# Patient Record
Sex: MALE | Race: WHITE | Employment: OTHER | URBAN - METROPOLITAN AREA
[De-identification: names, ages, dates, MRNs, and addresses within clinical notes are randomized per-mention and may not be internally consistent; named-entity substitution may affect disease eponyms.]

---

## 2017-01-23 DIAGNOSIS — F51.02 TRANSIENT INSOMNIA: ICD-10-CM

## 2017-01-23 DIAGNOSIS — F41.9 ANXIETY: Primary | ICD-10-CM

## 2017-01-26 RX ORDER — ZOLPIDEM TARTRATE 10 MG/1
TABLET ORAL
Qty: 30 TABLET | Refills: 0 | Status: SHIPPED | OUTPATIENT
Start: 2017-01-26 | End: 2017-02-24

## 2017-01-26 RX ORDER — LORAZEPAM 1 MG/1
TABLET ORAL
Qty: 30 TABLET | Refills: 0 | Status: SHIPPED | OUTPATIENT
Start: 2017-01-26 | End: 2017-02-24

## 2017-01-31 ENCOUNTER — TELEPHONE (OUTPATIENT)
Dept: FAMILY MEDICINE | Facility: CLINIC | Age: 62
End: 2017-01-31

## 2017-01-31 NOTE — TELEPHONE ENCOUNTER
Patient has not gotten refill yet.  Per note it was faxed to [facility] instead of [facility].  [facility] and let patient know that it was done.  Apologized.  Dina Keys RN- Triage [employer]

## 2017-02-24 DIAGNOSIS — F51.02 TRANSIENT INSOMNIA: ICD-10-CM

## 2017-02-24 DIAGNOSIS — F41.9 ANXIETY: ICD-10-CM

## 2017-02-24 RX ORDER — LORAZEPAM 1 MG/1
TABLET ORAL
Qty: 30 TABLET | Refills: 0 | Status: SHIPPED | OUTPATIENT
Start: 2017-02-24 | End: 2017-04-07

## 2017-02-24 RX ORDER — ZOLPIDEM TARTRATE 10 MG/1
TABLET ORAL
Qty: 30 TABLET | Refills: 0 | Status: SHIPPED | OUTPATIENT
Start: 2017-02-24 | End: 2017-04-07

## 2017-02-24 NOTE — TELEPHONE ENCOUNTER
LORazepam (ATIVAN) 1 MG tablet      Last Written Prescription Date:  1/26/17  Last Fill Quantity: 30,   # refills: 0  Last Office Visit with [facility], [facility] or [facility] prescribing provider: 10/10/16  Future Office visit:       Routing refill request to provider for review/approval because:  Drug not on the [facility], [facility] or [facility] refill protocol or controlled substance        zolpidem (AMBIEN) 10 MG tablet      Last Written Prescription Date:  1/26/16  Last Fill Quantity: 30,   # refills: 0  Last Office Visit with [facility], [facility] or [facility] prescribing provider: 10/10/16  Future Office visit:       Routing refill request to provider for review/approval because:  Drug not on the [facility], [facility] or [facility] refill protocol or controlled substance

## 2017-04-07 DIAGNOSIS — F51.02 TRANSIENT INSOMNIA: ICD-10-CM

## 2017-04-07 DIAGNOSIS — F41.9 ANXIETY: ICD-10-CM

## 2017-04-07 NOTE — TELEPHONE ENCOUNTER
zolpidem      Last Written Prescription Date:  2-24-17  Last Fill Quantity: 30,   # refills: 0  Last Office Visit with [facility], [facility] or [facility] prescribing provider: 11-17-16  Future Office visit:       Routing refill request to provider for review/approval because:  Drug not on the [facility], [facility] or [facility] refill protocol or controlled substance

## 2017-04-07 NOTE — TELEPHONE ENCOUNTER
lorazepam      Last Written Prescription Date:  2-24-17  Last Fill Quantity: 30,   # refills: 0  Last Office Visit with [facility], [facility] or [facility] prescribing provider: 11-17-16  Future Office visit:       Routing refill request to provider for review/approval because:  Drug not on the [facility], [facility] or  [facility] refill protocol or controlled substance

## 2017-04-11 RX ORDER — LORAZEPAM 1 MG/1
TABLET ORAL
Qty: 30 TABLET | Refills: 0 | Status: SHIPPED | OUTPATIENT
Start: 2017-04-11 | End: 2017-05-04

## 2017-04-11 RX ORDER — ZOLPIDEM TARTRATE 10 MG/1
TABLET ORAL
Qty: 30 TABLET | Refills: 0 | Status: SHIPPED | OUTPATIENT
Start: 2017-04-11 | End: 2017-05-04

## 2017-04-25 ENCOUNTER — OFFICE VISIT (OUTPATIENT)
Dept: FAMILY MEDICINE | Facility: CLINIC | Age: 62
End: 2017-04-25
Payer: COMMERCIAL

## 2017-04-25 VITALS
RESPIRATION RATE: 16 BRPM | DIASTOLIC BLOOD PRESSURE: 64 MMHG | HEART RATE: 63 BPM | WEIGHT: 175 LBS | BODY MASS INDEX: 26.22 KG/M2 | SYSTOLIC BLOOD PRESSURE: 122 MMHG | OXYGEN SATURATION: 95 % | TEMPERATURE: 96.9 F

## 2017-04-25 DIAGNOSIS — M54.50 CHRONIC LEFT-SIDED LOW BACK PAIN WITHOUT SCIATICA: Primary | ICD-10-CM

## 2017-04-25 DIAGNOSIS — G89.29 CHRONIC LEFT-SIDED LOW BACK PAIN WITHOUT SCIATICA: Primary | ICD-10-CM

## 2017-04-25 DIAGNOSIS — Z85.46 HX OF PROSTATIC MALIGNANCY: ICD-10-CM

## 2017-04-25 PROCEDURE — 99213 OFFICE O/P EST LOW 20 MIN: CPT | Performed by: FAMILY MEDICINE

## 2017-04-25 NOTE — PROGRESS NOTES
"  SUBJECTIVE:                                                    Sergio Estevez is a 61 year old male who presents to clinic today for the following health issues:  There are no preventive care reminders to display for this patient.  Health Maintenance reviewed at today's visit patient asked to schedule/complete:   None, Health Maintenance up to date.      Back Pain      Duration: 5 months        Specific cause: lifting and moving many people in a short time frame    Description:   Location of pain: low back right  Character of pain: dull ache and constant  Pain radiation:up into back  New numbness or weakness in legs, not attributed to pain:  no     Intensity: At its worst 3-4/10    History:   Pain interferes with job: Not applicable  History of back problems: no prior back problems  Any previous MRI or X-rays: Yes- at [place].  Date Nov 2016  Sees a specialist for back pain:  No  Therapies tried without relief: Physical Therapy    Alleviating factors:   Improved by: unsure, PT may have helped some      Precipitating factors:  Worsened by: Sitting and Lying Flat    Functional and Psychosocial Screen (Natalia STarT Back):      Not performed today        Accompanying Signs & Symptoms:  Risk of Fracture:  None  Risk of Cauda Equina:  None  Risk of Infection:  None  Risk of Cancer:  History of cancer  Risk of Ankylosing Spondylitis:  Onset at age <35, male, AND morning back stiffness. no                   PERSONAL HISTORY OF PROSTATE CANCER WITHOUT RECURRENCE   SURGERY 2009   LDL OR \"BAD\" CHOLESTEROL  GOAL < 100  MIXED   MIXED HEARING LOSS   INSOMNIA   VITAMIN D REPLACEMENT   HISTORY OF GOUT WITHOUT RECURRENCE   MILD TO MODERATE ANXIETY   TRANSIENT INSOMNIA           Problem list and histories reviewed & adjusted, as indicated.  Additional history: as documented      Patient Active Problem List   Diagnosis     Mixed hyperlipidemia     Mixed hearing loss, unilateral     Malignant neoplasm of prostate (H)     Insomnia "     Hyperlipidemia LDL goal <100     Gout     Vitamin D insufficiency     Impotence of organic origin     History of prostate cancer     History of gout     Anxiety     Transient insomnia     Routine general medical examination at a health care facility     ACP (advance care planning)     Past Surgical History:   Procedure Laterality Date     OTHER SURGICAL HISTORY  1976    4 toes amputated from industrial accident       Social History   Substance Use Topics     Smoking status: Former Smoker     Quit date: 11/3/1982     Smokeless tobacco: Never Used     Alcohol use Yes      Comment: ocasionally     Family History   Problem Relation Age of Onset     CANCER Mother      Breast Cancer Mother      Alzheimer Disease Father          Current Outpatient Prescriptions   Medication Sig Dispense Refill     zolpidem (AMBIEN) 10 MG tablet TAKE 1 TABLET BY MOUTH AT BEDTIME AS NEEDED FOR SLEEP 30 tablet 0     LORazepam (ATIVAN) 1 MG tablet TAKE 1/2 TO 1 TAB BY MOUTH EVERY 8 HOURS AS NEEDED FOR ANXIETY 30 tablet 0     Vitamin D, Cholecalciferol, 1000 UNITS CAPS Take 1 tablet by mouth daily 30 capsule 11     allopurinol (ZYLOPRIM) 100 MG tablet Take 1 tablet (100 mg) by mouth daily 90 tablet 2     Multiple Vitamins-Minerals (MULTI FOR HIM 50+) TABS Take 1 tablet by mouth daily 30 tablet 11     Ibuprofen (IBU PO) Take  by mouth.       indomethacin (INDOCIN) 25 MG capsule Take 1 capsule (25 mg) by mouth 2 times daily (with meals) (Patient not taking: Reported on 4/25/2017) 42 capsule 1     colchicine (COLCRYS) 0.6 MG tablet Take 1 tablet (0.6 mg) by mouth daily (Patient not taking: Reported on 4/25/2017) 90 tablet 3     albuterol (ALBUTEROL) 108 (90 BASE) MCG/ACT inhaler Inhale 2 puffs into the lungs every 4 hours as needed for shortness of breath / dyspnea (Patient not taking: Reported on 4/25/2017) 1 Inhaler 0     Red Yeast Rice Extract ([facility] RED YEAST RICE) 600 MG CAPS Take 2 capsules by mouth 2 times daily (with meals) (Patient not  taking: Reported on 4/25/2017) 120 capsule 11     Plant Sterols and Stanols 450 MG TABS Take 2 tablets by mouth 2 times daily (Patient not taking: Reported on 4/25/2017) 12 tablet PRN     vardenafil (LEVITRA) 20 MG tablet Take 0.5-1 tablets (10-20 mg) by mouth daily as needed for erectile dysfunction Never use with nitroglycerin, terazosin or doxazosin. (Patient not taking: Reported on 4/25/2017) 6 tablet 11     Allergies   Allergen Reactions     Keflex [Cephalexin Hcl]      Recent Labs   Lab Test  10/10/16   1113  10/26/15   0839  10/01/14   0845   02/04/13   0835   LDL  135*  124  128   < >  140*   HDL  49  44  57   < >  41   TRIG  110  119  37   < >  123   ALT  20  19  21   < >  19   CR  1.28*   --   1.20   < >  1.30*   GFRESTIMATED  57*   --   62   < >  57*   GFRESTBLACK  69   --   75   < >  69   POTASSIUM  4.3   --   4.5   < >  4.5   TSH   --    --    --    --   1.95    < > = values in this interval not displayed.      BP Readings from Last 3 Encounters:   04/25/17 122/64   11/17/16 120/82   10/10/16 124/76    Wt Readings from Last 3 Encounters:   04/25/17 175 lb (79.4 kg)   11/17/16 173 lb (78.5 kg)   10/10/16 171 lb 6.4 oz (77.7 kg)                  Labs reviewed in EPIC    Reviewed and updated as needed this visit by clinical staff       Reviewed and updated as needed this visit by Provider         ROS: has Mixed hyperlipidemia; Mixed hearing loss, unilateral; Malignant neoplasm of prostate (H); Insomnia; Hyperlipidemia LDL goal <100; Gout; Vitamin D insufficiency; Impotence of organic origin; History of prostate cancer; History of gout; Anxiety; Transient insomnia; Routine general medical examination at a health care facility; and ACP (advance care planning) on his problem list.    C: NEGATIVE for fever, chills, change in weight  I: NEGATIVE for worrisome rashes, moles or lesions  E: NEGATIVE for vision changes or irritation  E/M: NEGATIVE for ear, mouth and throat problems  R: NEGATIVE for significant  cough or SOB  CV: NEGATIVE for chest pain, palpitations or peripheral edema  GI: NEGATIVE for nausea, abdominal pain, heartburn, or change in bowel habits  : NEGATIVE for frequency, dysuria, or hematuria   male :erectile dysfunction and  HISTORY OF PROSTATE CARCINOMA REMOVAL WITHOUT COMPLICATION   MUSCULOSKELETAL: LOWER BACK PAIN CENTRAL WITHOUT RADICULOPATHY   N: NEGATIVE for weakness, dizziness or paresthesias  E: NEGATIVE for temperature intolerance, skin/hair changes  H: NEGATIVE for bleeding problems  P: NEGATIVE for changes in mood or affect    OBJECTIVE:                                                    /64 (BP Location: Right arm, Cuff Size: Adult Regular)  Pulse 63  Temp 96.9  F (36.1  C) (Tympanic)  Resp 16  Wt 175 lb (79.4 kg)  SpO2 95%  BMI 26.22 kg/m2  Body mass index is 26.22 kg/(m^2).  GENERAL: healthy, alert and no distress  EYES: Eyes grossly normal to inspection, PERRL and conjunctivae and sclerae normal  NECK: no adenopathy, no asymmetry, masses, or scars and thyroid normal to palpation  RESP: lungs clear to auscultation - no rales, rhonchi or wheezes  CV: regular rate and rhythm, normal S1 S2, no S3 or S4, no murmur, click or rub, no peripheral edema and peripheral pulses strong  ABDOMEN: soft, nontender, no hepatosplenomegaly, no masses and bowel sounds normal  MS:  NORMAL RANGE OF MOTION UPPER EXTREMETIES   NORMAL RANGE OF MOTION LOWER EXTREMITY   NEGATIVE STRAIGHT LEG RAISING TEST   DECREASE RANGE OF MOTION OF BACK   FLEXION TO 90 DEGREES   ROTATION AND LATERAL BENDING WITHIN NORMAL LIMITS   SLIGHT  PAIN with EXTENSION   NEURO: Normal strength and tone, mentation intact and speech normal  PSYCH: mentation appears normal, affect normal/bright    Diagnostic Test Results:  Results for orders placed or performed in visit on 11/17/16   XR Lumbar Spine 2/3 Views    Narrative    LUMBAR SPINE TWO TO THREE VIEWS  11/17/2016 1:54 PM     HISTORY: Low back pain.    COMPARISON: None.       Impression    IMPRESSION: Lumbar vertebrae are normally aligned. No compression  deformity or acute fracture.    DAT POLANCO MD        ASSESSMENT/PLAN:                                                          ICD-10-CM    1. Chronic left-sided low back pain without sciatica M54.5 MR Lumbar Spine w/o Contrast    G89.29 RADIOLOGY REFERRAL   2. Hx of prostatic malignancy Z85.46 RADIOLOGY REFERRAL         Patient Instructions   (M54.5,  G89.29) Chronic left-sided low back pain without sciatica  (primary encounter diagnosis)  Comment:    Plan: MR Lumbar Spine w/o Contrast, RADIOLOGY         REFERRAL             (Z85.46) Hx of prostatic malignancy  Comment:    Plan: RADIOLOGY REFERRAL    Assessment: Lower back pain    Plan: do these exercises at least twice daily:  Standing or sitting exercise can be done every two hours    Justino' Flexion Versus Campbell   Extension Exercises For   Low Back Pain   Examples of Justino' Flexion Exercises  1. Pelvic tilt.  Please press the small of your back against the floor.  Start with 5-10  and increase to 100 count over one month   2. Single Knee to chest. Lie on your back with legs in bent position. Alternate one leg and the other very slowly bringing the knee to chest.  Start with a count of 5-10   Over one month work up to 100  3. Double knee to chest.  Lie on your back with knees in bent position.  Bring both knees to the chest slowly hold for a count of 5-to 10. Over one month work to 100  4. Partial sit-up or crunch.  Lie on your back in bent leg position.  Please bring your body with arms crossed in front  To 30 degrees of flexion. Start with 5-10 over one month work up to 100 or more  5. Sit back.  Please sit on the side of the bed or a stair landing  And lie backwards until the abdominal muscles start to quiver.  Hold for a count of 5-10 and over a month work up to 100.  5. Hamstring stretch.  Please extend your legs while sitting on the floor as tolerated for 5-10  count.  Gradually increase to count of 30 over one month      Alternately find a stair landing or sturdy chair and place heel  In a comfortable level of extension  And stretch one hamstring at a time for 5-10 seconds.  Increase to count of 30 over one month                         Squat.  Stand with legs comfortably apart and lower the body slowly by flexing the knees  for count of 5-10 over one month increase to 30.  Useful for anterior disc protrusion, facette joint arthritis spond-10ylolysis, spondylolisthesis and spinal stenosis    Julisas method or extension exercises  Useful disc bulging posteriorly  1. Prone pressups  Please lie on your abdomen.   Please do a push with the upper half of your body only.  This should not cause the pain to shoot down your buttock thigh leg or foot  Start with 10 and repeat x 3 one minute apart.  Repeat x 10 every 1-2 hours  Pain tends to increase in the center of back  And leaves buttock thighs legs and foot over time  You may shift your pelvis in opposite direction of buttock and leg pain to achieve even better results  2. Superman with arms extended  Or at the side Simultaneously lift your arms and legs off the floor. Start with 5-10 over one month increase to 100.  3. Cat stretch or cobra Start  As if in extended position of prone pressup but hold the stretch for 5 or 10 count. Over one moth to count of 30.  4.  Extensions can be done in standing position  As well if prone pressup is inconvenient.  Shift your pelvis in opposite direction of limb pain.  Put your hands  Flat on your buttocks and lean backwards without loss of balance.  Count 10 x 3 times then 10 extensions hourly                         TARSHA LUU MD  [facility]

## 2017-04-25 NOTE — NURSING NOTE
"Chief Complaint   Patient presents with     Back Pain       Initial /64 (BP Location: Right arm, Cuff Size: Adult Regular)  Pulse 63  Temp 96.9  F (36.1  C) (Tympanic)  Resp 16  Wt 175 lb (79.4 kg)  SpO2 95%  BMI 26.22 kg/m2 Estimated body mass index is 26.22 kg/(m^2) as calculated from the following:    Height as of 10/10/16: 5' 8.5\" (1.74 m).    Weight as of this encounter: 175 lb (79.4 kg).  Medication Reconciliation: complete   Jeanette Jones CMA    "

## 2017-04-25 NOTE — MR AVS SNAPSHOT
After Visit Summary   4/25/2017    Sergio Estevez    MRN: [id number]           Patient Information     Date Of Birth          1955        Visit Information        Provider Department      4/25/2017 1:15 PM Stu Delacruz MD [facility]        Today's Diagnoses     Chronic left-sided low back pain without sciatica    -  1    Hx of prostatic malignancy          Care Instructions    (M54.5,  G89.29) Chronic left-sided low back pain without sciatica  (primary encounter diagnosis)  Comment:    Plan: MR Lumbar Spine w/o Contrast, RADIOLOGY         REFERRAL             (Z85.46) Hx of prostatic malignancy  Comment:    Plan: RADIOLOGY REFERRAL    Assessment: Lower back pain    Plan: do these exercises at least twice daily:  Standing or sitting exercise can be done every two hours    Justino' Flexion Versus Campbell   Extension Exercises For   Low Back Pain   Examples of Justino' Flexion Exercises  1. Pelvic tilt.  Please press the small of your back against the floor.  Start with 5-10  and increase to 100 count over one month   2. Single Knee to chest. Lie on your back with legs in bent position. Alternate one leg and the other very slowly bringing the knee to chest.  Start with a count of 5-10   Over one month work up to 100  3. Double knee to chest.  Lie on your back with knees in bent position.  Bring both knees to the chest slowly hold for a count of 5-to 10. Over one month work to 100  4. Partial sit-up or crunch.  Lie on your back in bent leg position.  Please bring your body with arms crossed in front  To 30 degrees of flexion. Start with 5-10 over one month work up to 100 or more  5. Sit back.  Please sit on the side of the bed or a stair landing  And lie backwards until the abdominal muscles start to quiver.  Hold for a count of 5-10 and over a month work up to 100.  5. Hamstring stretch.  Please extend your legs while sitting on the floor as  tolerated for 5-10 count.  Gradually increase to count of 30 over one month      Alternately find a stair landing or sturdy chair and place heel  In a comfortable level of extension  And stretch one hamstring at a time for 5-10 seconds.  Increase to count of 30 over one month                         Squat.  Stand with legs comfortably apart and lower the body slowly by flexing the knees  for count of 5-10 over one month increase to 30.  Useful for anterior disc protrusion, facette joint arthritis spond-10ylolysis, spondylolisthesis and spinal stenosis    Julissa method or extension exercises  Useful disc bulging posteriorly  1. Prone pressups  Please lie on your abdomen.   Please do a push with the upper half of your body only.  This should not cause the pain to shoot down your buttock thigh leg or foot  Start with 10 and repeat x 3 one minute apart.  Repeat x 10 every 1-2 hours  Pain tends to increase in the center of back  And leaves buttock thighs legs and foot over time  You may shift your pelvis in opposite direction of buttock and leg pain to achieve even better results  2. Superman with arms extended  Or at the side Simultaneously lift your arms and legs off the floor. Start with 5-10 over one month increase to 100.  3. Cat stretch or cobra Start  As if in extended position of prone pressup but hold the stretch for 5 or 10 count. Over one moth to count of 30.  4.  Extensions can be done in standing position  As well if prone pressup is inconvenient.  Shift your pelvis in opposite direction of limb pain.  Put your hands  Flat on your buttocks and lean backwards without loss of balance.  Count 10 x 3 times then 10 extensions hourly                           Follow-ups after your visit        Additional Services     RADIOLOGY REFERRAL       Your provider has referred you to: [facility]: [facility] [phone]   [email or web address] open sided [facility] lumbar spine   6 month  history of lower back pain without radiation Lumbar Sacral area without radiculopathy   History of prostate cancer March 2009    Please be aware that coverage of these services is subject to the terms and limitations of your health insurance plan.  Call member services at your health plan with any benefit or coverage questions.      Please bring the following to your appointment:    >>   Any x-rays, CTs or MRIs which have been performed.  Contact the facility where they were done to arrange for  prior to your scheduled appointment.    >>   List of current medications   >>   This referral request   >>   Any documents/labs given to you for this referral    Prior authorization is required for MRI/MRA, CT, Dexa Scans and Worker's Compensation cases.                  Follow-up notes from your care team     Return in about 4 weeks (around 5/23/2017).      Future tests that were ordered for you today     Open Future Orders        Priority Expected Expires Ordered    MR Lumbar Spine w/o Contrast Routine  4/25/2018 4/25/2017            Who to contact     If you have questions or need follow up information about today's clinic visit or your schedule please contact [facility] directly at [phone].  Normal or non-critical lab and imaging results will be communicated to you by [employer], letter or phone within 4 business days after the clinic has received the results. If you do not hear from us within 7 days, please contact the clinic through [employer] or phone. If you have a critical or abnormal lab result, we will notify you by phone as soon as possible.  Submit refill requests through [employer] or call your pharmacy and they will forward the refill request to us. Please allow 3 business days for your refill to be completed.          Additional Information About Your Visit        [employer] Information     [employer] gives you secure access to your electronic health record. If you see a  primary care provider, you can also send messages to your care team and make appointments. If you have questions, please call your primary care clinic.  If you do not have a primary care provider, please call [phone] and they will assist you.        Care EveryWhere ID     This is your Care EveryWhere ID. This could be used by other organizations to access your [place] medical records  [id number]        Your Vitals Were     Pulse Temperature Respirations Pulse Oximetry BMI (Body Mass Index)       63 96.9  F (36.1  C) (Tympanic) 16 95% 26.22 kg/m2        Blood Pressure from Last 3 Encounters:   04/25/17 122/64   11/17/16 120/82   10/10/16 124/76    Weight from Last 3 Encounters:   04/25/17 175 lb (79.4 kg)   11/17/16 173 lb (78.5 kg)   10/10/16 171 lb 6.4 oz (77.7 kg)              We Performed the Following     RADIOLOGY REFERRAL        Primary Care Provider Office Phone # Fax #    Stu Kimberlyn Delacruz [phone]       [facility] [address] [facility] [address]        Thank you!     Thank you for choosing [facility]  for your care. Our goal is always to provide you with excellent care. Hearing back from our patients is one way we can continue to improve our services. Please take a few minutes to complete the written survey that you may receive in the mail after your visit with us. Thank you!             Your Updated Medication List - Protect others around you: Learn how to safely use, store and throw away your medicines at www.disposemymeds.org.          This list is accurate as of: 4/25/17  1:52 PM.  Always use your most recent med list.                   Brand Name Dispense Instructions for use    albuterol 108 (90 BASE) MCG/ACT Inhaler    albuterol    1 Inhaler    Inhale 2 puffs into the lungs every 4 hours as needed for shortness of breath / dyspnea       allopurinol 100 MG tablet    ZYLOPRIM    90 tablet    Take 1 tablet (100  mg) by mouth daily       colchicine 0.6 MG tablet    COLCRYS    90 tablet    Take 1 tablet (0.6 mg) by mouth daily       IBU PO      Take  by mouth.       indomethacin 25 MG capsule    INDOCIN    42 capsule    Take 1 capsule (25 mg) by mouth 2 times daily (with meals)       LORazepam 1 MG tablet    ATIVAN    30 tablet    TAKE 1/2 TO 1 TAB BY MOUTH EVERY 8 HOURS AS NEEDED FOR ANXIETY       MULTI FOR HIM 50+ Tabs     30 tablet    Take 1 tablet by mouth daily       Plant Sterols and Stanols 450 MG Tabs     12 tablet    Take 2 tablets by mouth 2 times daily       Red Yeast Rice Extract 600 MG Caps    [facility] RED YEAST RICE    120 capsule    Take 2 capsules by mouth 2 times daily (with meals)       vardenafil 20 MG tablet    LEVITRA    6 tablet    Take 0.5-1 tablets (10-20 mg) by mouth daily as needed for erectile dysfunction Never use with nitroglycerin, terazosin or doxazosin.       Vitamin D (Cholecalciferol) 1000 UNITS Caps     30 capsule    Take 1 tablet by mouth daily       zolpidem 10 MG tablet    AMBIEN    30 tablet    TAKE 1 TABLET BY MOUTH AT BEDTIME AS NEEDED FOR SLEEP

## 2017-04-25 NOTE — PATIENT INSTRUCTIONS
(M54.5,  G89.29) Chronic left-sided low back pain without sciatica  (primary encounter diagnosis)  Comment:    Plan: MR Lumbar Spine w/o Contrast, RADIOLOGY         REFERRAL             (Z85.46) Hx of prostatic malignancy  Comment:    Plan: RADIOLOGY REFERRAL    Assessment: Lower back pain    Plan: do these exercises at least twice daily:  Standing or sitting exercise can be done every two hours    Justino' Flexion Versus Campbell   Extension Exercises For   Low Back Pain   Examples of Justino' Flexion Exercises  1. Pelvic tilt.  Please press the small of your back against the floor.  Start with 5-10  and increase to 100 count over one month   2. Single Knee to chest. Lie on your back with legs in bent position. Alternate one leg and the other very slowly bringing the knee to chest.  Start with a count of 5-10   Over one month work up to 100  3. Double knee to chest.  Lie on your back with knees in bent position.  Bring both knees to the chest slowly hold for a count of 5-to 10. Over one month work to 100  4. Partial sit-up or crunch.  Lie on your back in bent leg position.  Please bring your body with arms crossed in front  To 30 degrees of flexion. Start with 5-10 over one month work up to 100 or more  5. Sit back.  Please sit on the side of the bed or a stair landing  And lie backwards until the abdominal muscles start to quiver.  Hold for a count of 5-10 and over a month work up to 100.  5. Hamstring stretch.  Please extend your legs while sitting on the floor as tolerated for 5-10 count.  Gradually increase to count of 30 over one month      Alternately find a stair landing or sturdy chair and place heel  In a comfortable level of extension  And stretch one hamstring at a time for 5-10 seconds.  Increase to count of 30 over one month                         Squat.  Stand with legs comfortably apart and lower the body slowly by flexing the knees  for count of 5-10 over one month increase to 30.  Useful for  anterior disc protrusion, facette joint arthritis spond-10ylolysis, spondylolisthesis and spinal stenosis    Julissa method or extension exercises  Useful disc bulging posteriorly  1. Prone pressups  Please lie on your abdomen.   Please do a push with the upper half of your body only.  This should not cause the pain to shoot down your buttock thigh leg or foot  Start with 10 and repeat x 3 one minute apart.  Repeat x 10 every 1-2 hours  Pain tends to increase in the center of back  And leaves buttock thighs legs and foot over time  You may shift your pelvis in opposite direction of buttock and leg pain to achieve even better results  2. Superman with arms extended  Or at the side Simultaneously lift your arms and legs off the floor. Start with 5-10 over one month increase to 100.  3. Cat stretch or cobra Start  As if in extended position of prone pressup but hold the stretch for 5 or 10 count. Over one moth to count of 30.  4.  Extensions can be done in standing position  As well if prone pressup is inconvenient.  Shift your pelvis in opposite direction of limb pain.  Put your hands  Flat on your buttocks and lean backwards without loss of balance.  Count 10 x 3 times then 10 extensions hourly

## 2017-04-27 ENCOUNTER — TELEPHONE (OUTPATIENT)
Dept: FAMILY MEDICINE | Facility: CLINIC | Age: 62
End: 2017-04-27

## 2017-04-27 DIAGNOSIS — M54.50 CHRONIC LEFT-SIDED LOW BACK PAIN WITHOUT SCIATICA: Primary | ICD-10-CM

## 2017-04-27 DIAGNOSIS — Z85.46 HX OF PROSTATIC MALIGNANCY: ICD-10-CM

## 2017-04-27 DIAGNOSIS — G89.29 CHRONIC LEFT-SIDED LOW BACK PAIN WITHOUT SCIATICA: Primary | ICD-10-CM

## 2017-04-27 NOTE — TELEPHONE ENCOUNTER
MRI order for CDI pended. Please sign if agree with order and route message to triage with response.

## 2017-04-27 NOTE — TELEPHONE ENCOUNTER
Reason for Call: Request for an order or referral:    Order or referral being requested: MRI    Date needed: as soon as possible    Has the patient been seen by the PCP for this problem? YES    Additional comments: Patient states Dr Delacruz already gave him an order for an MRI. Patient would like to switch the order to another place. Patient would like an order sent to [facility] in [facility] for MRI. Please fax [phone]. Patient would like to go to [facility] as they have the open ended MRI. Patient did try to go to the other place but couldn't do it. If any questions please call. Please let patient know when order is sent    Phone number Patient can be reached at:  Home number on file [phone] (home)    Best Time:  Any    Can we leave a detailed message on this number?  YES    Call taken on 4/27/2017 at 2:28 PM by RICA MATTHEWS

## 2017-05-03 ENCOUNTER — TRANSFERRED RECORDS (OUTPATIENT)
Dept: HEALTH INFORMATION MANAGEMENT | Facility: CLINIC | Age: 62
End: 2017-05-03

## 2017-05-04 DIAGNOSIS — F41.9 ANXIETY: ICD-10-CM

## 2017-05-04 DIAGNOSIS — F51.02 TRANSIENT INSOMNIA: ICD-10-CM

## 2017-05-04 NOTE — TELEPHONE ENCOUNTER
LORazepam (ATIVAN) 1 MG tablet      Last Written Prescription Date:  4/11/17  Last Fill Quantity: 30,   # refills: 0  Last Office Visit with [facility], [facility] or [facility] prescribing provider: 4/25/17  Future Office visit:       Routing refill request to provider for review/approval because:  Drug not on the [facility], [facility] or [facility] refill protocol or controlled substance

## 2017-05-04 NOTE — TELEPHONE ENCOUNTER
zolpidem (AMBIEN) 10 MG tablet      Last Written Prescription Date:  4/11/17  Last Fill Quantity: 30,   # refills: 0  Last Office Visit with [facility], [facility] or [facility] prescribing provider: 4/25/17  Future Office visit:       Routing refill request to provider for review/approval because:  Drug not on the [facility], [facility] or [facility] refill protocol or controlled substance

## 2017-05-05 RX ORDER — LORAZEPAM 1 MG/1
TABLET ORAL
Qty: 30 TABLET | Refills: 0 | Status: SHIPPED | OUTPATIENT
Start: 2017-05-05 | End: 2017-05-05

## 2017-05-05 RX ORDER — ZOLPIDEM TARTRATE 10 MG/1
TABLET ORAL
Qty: 30 TABLET | Refills: 3 | Status: SHIPPED | OUTPATIENT
Start: 2017-05-05 | End: 2017-08-30

## 2017-05-05 RX ORDER — LORAZEPAM 1 MG/1
TABLET ORAL
Qty: 30 TABLET | Refills: 3 | Status: SHIPPED | OUTPATIENT
Start: 2017-05-05 | End: 2017-08-30

## 2017-05-05 NOTE — TELEPHONE ENCOUNTER
Controlled Substance Refill Request for LORazepam (ATIVAN) 1 MG tablet  Problem List Complete:  No     PROVIDER TO CONSIDER COMPLETION OF PROBLEM LIST AND OVERVIEW/CONTROLLED SUBSTANCE AGREEMENT        Controlled substance agreement on file: No.     Processing:  Fax Rx to [facility] pharmacy   checked in past 6 months?  Yes 05/05/2017   [place] reviewed:  reviewed- no concerns    [place]  [email or web address]

## 2017-05-05 NOTE — TELEPHONE ENCOUNTER
Controlled Substance Refill Request for zolpidem (AMBIEN) 10 MG tablet  Problem List Complete:  No     PROVIDER TO CONSIDER COMPLETION OF PROBLEM LIST AND OVERVIEW/CONTROLLED SUBSTANCE AGREEMENT        Controlled substance agreement on file: No.     Processing:  Fax Rx to [facility] pharmacy   checked in past 6 months?  Yes 05/05/2017   [place] reviewed:  reviewed- no concerns    [place]  [email or web address]

## 2017-05-09 ENCOUNTER — TELEPHONE (OUTPATIENT)
Dept: FAMILY MEDICINE | Facility: CLINIC | Age: 62
End: 2017-05-09

## 2017-05-09 NOTE — TELEPHONE ENCOUNTER
Pt wanted to give us a FYI.  Yesterday he had some dizziness. IT would happen when he came from a prone position. He thought it might be from his zolpidem so he did not take it last night. He felt better today- only sl dizzy first thing this morning. He has no allergies. Dose not happen every time he sits up. Has not had lorazepam for 6 weeks. He will increase fluid intake. If it continues he will make an appointment.

## 2017-05-09 NOTE — TELEPHONE ENCOUNTER
Reason for call:  Patient reporting a symptom    Symptom or request:   Vertigo on rising from prone position    Duration (how long have symptoms been present):   About a week    Have you been treated for this before? No    Additional comments:    Patient has experienced this several times    Phone Number patient can be reached at:  Home number on file [phone] (home)    Best Time:     Anytime    Can we leave a detailed message on this number:  YES    Call taken on 5/9/2017 at 12:08 PM by KAYLEEN PUGA

## 2017-05-09 NOTE — TELEPHONE ENCOUNTER
Reason for Call:  Request for results:    Name of test or procedure:    MRI  Date of test of procedure:    05/03/2017    Location of the test or procedure:   [facility] to leave the result message on voice mail or with a family member? YES    Phone number Patient can be reached at:  Home number on file [phone] (home)    Additional comments:    Please check into results and call patient    Call taken on 5/9/2017 at 12:04 PM by KAYLEEN PUGA

## 2017-05-10 NOTE — TELEPHONE ENCOUNTER
SEND COPY OF [facility] REPORT TO PATIENT     SOME MILD LUMBAR DISC DISEASE     DOES NOT LOOK AS IF HE NEEDS SURGERY    GET ANOTHER COPY FROM [facility] IF NOT AVAILABLE     YUE BRAND NA HAD IT     TARSHA LUU JR., MD

## 2017-05-15 NOTE — TELEPHONE ENCOUNTER
Called [facility] in [place] to get copy of MRI because we do not have any copies of it from what I can see.   Do you still need to review it or have you seen the report already?

## 2017-05-16 ENCOUNTER — MYC MEDICAL ADVICE (OUTPATIENT)
Dept: FAMILY MEDICINE | Facility: CLINIC | Age: 62
End: 2017-05-16

## 2017-05-16 NOTE — TELEPHONE ENCOUNTER
LEFT MESSAGE TO CALL BACK   SEND COPY OF REPORT TO PATIENT  I WOULD LIKE TO REVIEW IT AGAIN  NOT IN CHART YET   TARSHA LUU JR., MD

## 2017-05-18 NOTE — TELEPHONE ENCOUNTER
5 LORDOTIC VERTEBRA  WITHIN NORMAL LIMITS   MILD TO MODERATE FACETTE JOINT L4-5 AND L5 S1 LEVEL  OSTEOARTHRITIS   MILD BULGING LUMBAR 1-5 AND L5-S1 LEVEL  DISC BULGIN VERY MILD   NO DISC RUPTURE  NO NERVE IMPINGEMENT   NO FRACTURE OR BONE INVOLVEMENT   TARSHA LUU JR., MD

## 2017-05-31 ENCOUNTER — TELEPHONE (OUTPATIENT)
Dept: FAMILY MEDICINE | Facility: CLINIC | Age: 62
End: 2017-05-31

## 2017-05-31 NOTE — TELEPHONE ENCOUNTER
Patient called the clinic, future appt tomorrow with PCP to review MRI and discuss dizziness.   For the past 2 weeks he had new periodic dizziness with position changes.   He has been laying down to read more because of his back pain.   Also, spinning sensation when falling asleep and periodic mild nausea.    He is able to function okay around the house and drive.   States he eats good meals and drinks couple of glasses of water daily.   Has not had a yearly eye exam in 3 years.     NURSING PLAN: Nursing advice to patient given     Drink 6-8 water glasses a day. Eat balanced meals often.   Use a night light and get up slowly.     RECOMMENDED DISPOSITION:  See in 72 hours - patient will see provider tomorrow.   Will comply with recommendation: Yes  If further questions/concerns or if symptoms do not improve, worsen or new symptoms develop, call your PCP or [facility] as soon as possible.      Guideline used:  Telephone Triage Protocols for Nurses, Fifth Edition, Joan White RN

## 2017-06-01 ENCOUNTER — OFFICE VISIT (OUTPATIENT)
Dept: FAMILY MEDICINE | Facility: CLINIC | Age: 62
End: 2017-06-01
Payer: COMMERCIAL

## 2017-06-01 VITALS
TEMPERATURE: 97.8 F | BODY MASS INDEX: 26.3 KG/M2 | OXYGEN SATURATION: 97 % | DIASTOLIC BLOOD PRESSURE: 78 MMHG | RESPIRATION RATE: 16 BRPM | WEIGHT: 175.5 LBS | HEART RATE: 79 BPM | SYSTOLIC BLOOD PRESSURE: 132 MMHG

## 2017-06-01 DIAGNOSIS — M51.369 DDD (DEGENERATIVE DISC DISEASE), LUMBAR: ICD-10-CM

## 2017-06-01 DIAGNOSIS — H81.11 BPV (BENIGN POSITIONAL VERTIGO), RIGHT: ICD-10-CM

## 2017-06-01 DIAGNOSIS — H81.12 BPV (BENIGN POSITIONAL VERTIGO), LEFT: Primary | ICD-10-CM

## 2017-06-01 PROCEDURE — 99214 OFFICE O/P EST MOD 30 MIN: CPT | Performed by: FAMILY MEDICINE

## 2017-06-01 NOTE — NURSING NOTE
"Chief Complaint   Patient presents with     Results     MRI     Dizziness       Initial /78  Pulse 79  Temp 97.8  F (36.6  C) (Tympanic)  Resp 16  Wt 175 lb 8 oz (79.6 kg)  SpO2 97%  BMI 26.3 kg/m2 Estimated body mass index is 26.3 kg/(m^2) as calculated from the following:    Height as of 10/10/16: 5' 8.5\" (1.74 m).    Weight as of this encounter: 175 lb 8 oz (79.6 kg).  Medication Reconciliation: complete   Jeanette Jones CMA    "

## 2017-06-01 NOTE — MR AVS SNAPSHOT
After Visit Summary   6/1/2017    Sergio Estevez    MRN: [id number]           Patient Information     Date Of Birth          1955        Visit Information        Provider Department      6/1/2017 11:15 AM Stu Delacruz MD [facility]        Today's Diagnoses     BPV (benign positional vertigo), left    -  1    BPV (benign positional vertigo), right        DDD (degenerative disc disease), lumbar          Care Instructions    (H81.12) BPV (benign positional vertigo), left  (primary encounter diagnosis)  Comment:    Plan:  .[facility]    (H81.11) BPV (benign positional vertigo), right  Comment:    Plan:      (M51.36) DDD (degenerative disc disease), lumbar  Comment:    Plan:      Assessment: Lower back pain    Plan: do these exercises at least twice daily:  Standing or sitting exercise can be done every two hours    Justino' Flexion Versus Campbell   Extension Exercises For   Low Back Pain   Examples of Justino' Flexion Exercises  1. Pelvic tilt.  Please press the small of your back against the floor.  Start with 5-10  and increase to 100 count over one month   2. Single Knee to chest. Lie on your back with legs in bent position. Alternate one leg and the other very slowly bringing the knee to chest.  Start with a count of 5-10   Over one month work up to 100  3. Double knee to chest.  Lie on your back with knees in bent position.  Bring both knees to the chest slowly hold for a count of 5-to 10. Over one month work to 100  4. Partial sit-up or crunch.  Lie on your back in bent leg position.  Please bring your body with arms crossed in front  To 30 degrees of flexion. Start with 5-10 over one month work up to 100 or more  5. Sit back.  Please sit on the side of the bed or a stair landing  And lie backwards until the abdominal muscles start to quiver.  Hold for a count of 5-10 and over a month work up to 100.  5. Hamstring stretch.  Please extend your legs  while sitting on the floor as tolerated for 5-10 count.  Gradually increase to count of 30 over one month      Alternately find a stair landing or sturdy chair and place heel  In a comfortable level of extension  And stretch one hamstring at a time for 5-10 seconds.  Increase to count of 30 over one month                         Squat.  Stand with legs comfortably apart and lower the body slowly by flexing the knees  for count of 5-10 over one month increase to 30.  Useful for anterior disc protrusion, facette joint arthritis spond-10ylolysis, spondylolisthesis and spinal stenosis    Julissa method or extension exercises  Useful disc bulging posteriorly  1. Prone pressups  Please lie on your abdomen.   Please do a push with the upper half of your body only.  This should not cause the pain to shoot down your buttock thigh leg or foot  Start with 10 and repeat x 3 one minute apart.  Repeat x 10 every 1-2 hours  Pain tends to increase in the center of back  And leaves buttock thighs legs and foot over time  You may shift your pelvis in opposite direction of buttock and leg pain to achieve even better results  2. Superman with arms extended  Or at the side Simultaneously lift your arms and legs off the floor. Start with 5-10 over one month increase to 100.  3. Cat stretch or cobra Start  As if in extended position of prone pressup but hold the stretch for 5 or 10 count. Over one moth to count of 30.  4.  Extensions can be done in standing position  As well if prone pressup is inconvenient.  Shift your pelvis in opposite direction of limb pain.  Put your hands  Flat on your buttocks and lean backwards without loss of balance.  Count 10 x 3 times then 10 extensions hourly   FIT BIT TWITCH RECOMMENDED    WALKING 10,000 STEPS A DAY               Follow-ups after your visit        Who to contact     If you have questions or need follow up information about today's clinic visit or your schedule please contact [facility] directly at [phone].  Normal or non-critical lab and imaging results will be communicated to you by MyChart, letter or phone within 4 business days after the clinic has received the results. If you do not hear from us within 7 days, please contact the clinic through [facility] or phone. If you have a critical or abnormal lab result, we will notify you by phone as soon as possible.  Submit refill requests through [employer] or call your pharmacy and they will forward the refill request to us. Please allow 3 business days for your refill to be completed.          Additional Information About Your Visit        [facility] Dx Information     [employer] gives you secure access to your electronic health record. If you see a primary care provider, you can also send messages to your care team and make appointments. If you have questions, please call your primary care clinic.  If you do not have a primary care provider, please call [phone] and they will assist you.        Care EveryWhere ID     This is your Care EveryWhere ID. This could be used by other organizations to access your [place] medical records  [id number]        Your Vitals Were     Pulse Temperature Respirations Pulse Oximetry BMI (Body Mass Index)       79 97.8  F (36.6  C) (Tympanic) 16 97% 26.3 kg/m2        Blood Pressure from Last 3 Encounters:   06/01/17 132/78   04/25/17 122/64   11/17/16 120/82    Weight from Last 3 Encounters:   06/01/17 175 lb 8 oz (79.6 kg)   04/25/17 175 lb (79.4 kg)   11/17/16 173 lb (78.5 kg)              Today, you had the following     No orders found for display       Primary Care Provider Office Phone # Fax #    Stu Delacruz [phone]       [facility] [address]  [facility] [address]        Thank you!     Thank you for choosing [facility]  for your care. Our goal is always to provide you with excellent care.  Hearing back from our patients is one way we can continue to improve our services. Please take a few minutes to complete the written survey that you may receive in the mail after your visit with us. Thank you!             Your Updated Medication List - Protect others around you: Learn how to safely use, store and throw away your medicines at www.disposemymeds.org.          This list is accurate as of: 6/1/17 12:06 PM.  Always use your most recent med list.                   Brand Name Dispense Instructions for use    albuterol 108 (90 BASE) MCG/ACT Inhaler    albuterol    1 Inhaler    Inhale 2 puffs into the lungs every 4 hours as needed for shortness of breath / dyspnea       allopurinol 100 MG tablet    ZYLOPRIM    90 tablet    Take 1 tablet (100 mg) by mouth daily       colchicine 0.6 MG tablet    COLCRYS    90 tablet    Take 1 tablet (0.6 mg) by mouth daily       IBU PO      Take  by mouth.       indomethacin 25 MG capsule    INDOCIN    42 capsule    Take 1 capsule (25 mg) by mouth 2 times daily (with meals)       LORazepam 1 MG tablet    ATIVAN    30 tablet    TAKE 1/2 TO 1 TAB BY MOUTH EVERY 8 HOURS AS NEEDED FOR ANXIETY       MULTI FOR HIM 50+ Tabs     30 tablet    Take 1 tablet by mouth daily       Plant Sterols and Stanols 450 MG Tabs     12 tablet    Take 2 tablets by mouth 2 times daily       Red Yeast Rice Extract 600 MG Caps    [facility] RED YEAST RICE    120 capsule    Take 2 capsules by mouth 2 times daily (with meals)       vardenafil 20 MG tablet    LEVITRA    6 tablet    Take 0.5-1 tablets (10-20 mg) by mouth daily as needed for erectile dysfunction Never use with nitroglycerin, terazosin or doxazosin.       Vitamin D (Cholecalciferol) 1000 UNITS Caps     30 capsule    Take 1 tablet by mouth daily       zolpidem 10 MG tablet    AMBIEN    30 tablet    TAKE 1 TABLET BY MOUTH AT BEDTIME AS NEEDED FOR SLEEP

## 2017-06-01 NOTE — PROGRESS NOTES
"      SUBJECTIVE:                                                        Sergio Estevez is a 61 year old male who presents to clinic today for the following health issues:  There are no preventive care reminders to display for this patient.    Health Maintenance reviewed at today's visit patient asked to schedule/complete:     None, Health Maintenance up to date.            MRI results        Duration: 2-3 weeks ago    Description (location/character/radiation): back MRI    Intensity:  Pain is the same,     Accompanying signs and symptoms: sitting is the worst    History (similar episodes/previous evaluation): yes    Precipitating or alleviating factors: None    Therapies tried and outcome: None             Dizziness        Duration: 2 weeks    Description     Feeling faint:  no     Feeling like the surroundings are moving: YES, worse when laying down and rolling over    Loss of consciousness or falls: no     Intensity:  mild    Accompanying signs and symptoms:     Nausea/vomitting: YES- nausea    Palpitations: no     Weakness in arms or legs: no     Vision or speech changes: has been using readers, no eye exam in the last few years    Ringing in ears (Tinnitus): no     Hearing loss related to dizziness: no     Other (fevers/chills/sweating/dyspnea): no     History (similar episodes/head trauma/previous evaluation/recent bleeding): None    Precipitating or alleviating factors (new meds/chemicals): None    Worse with activity/head movement: no     Therapies tried and outcome: None         LDL OR \"BAD\" CHOLESTEROL  GOAL < 100 with HISTORY TRIGLYCERIDES CONTROLLED CURRENT REGIMEN    WITHOUT COMPLICATION     MIXED HEARING LOSS     NEOPLASM PROSTATE     INSOMNIA     LDL OR \"BAD\" CHOLESTEROL  GOAL < 100     VITAMIN D REPLACEMENT      SEXUAL DSYFUNCTION TREATMENT     EPISODIC INSOMNIA     BENIGN POSITIONAL VERTIGO NEW ONSET     TREATMENT PROGNOSIS BENEFITS AND RISKS DISCUSSED     SIDE EFFECTS BENEFITS AND RISKS DISCUSSED "      MEDICATION RISKS SIDE EFFECTS BENEFITS AND RISKS DISCUSSED     LUMBAR DISC DISEASE  WITH RIGHT GREATER THAN  LEFT SIDED LUMBAR DISC DISEASE     has Mixed hyperlipidemia; Mixed hearing loss, unilateral; Malignant neoplasm of prostate (H); Insomnia; Hyperlipidemia LDL goal <100; Gout; Vitamin D insufficiency; Impotence of organic origin; History of prostate cancer; History of gout; Anxiety; Transient insomnia; Routine general medical examination at a health care facility; ACP (advance care planning); BPV (benign positional vertigo), left; BPV (benign positional vertigo), right; and DDD (degenerative disc disease), lumbar on his problem list.        Problem list and histories reviewed & adjusted, as indicated.    Additional history: as documented        Patient Active Problem List   Diagnosis     Mixed hyperlipidemia     Mixed hearing loss, unilateral     Malignant neoplasm of prostate (H)     Insomnia     Hyperlipidemia LDL goal <100     Gout     Vitamin D insufficiency     Impotence of organic origin     History of prostate cancer     History of gout     Anxiety     Transient insomnia     Routine general medical examination at a health care facility     ACP (advance care planning)     BPV (benign positional vertigo), left     BPV (benign positional vertigo), right     DDD (degenerative disc disease), lumbar     Past Surgical History:   Procedure Laterality Date     OTHER SURGICAL HISTORY  1976    4 toes amputated from industrial accident       Social History   Substance Use Topics     Smoking status: Former Smoker     Quit date: 11/3/1982     Smokeless tobacco: Never Used     Alcohol use Yes      Comment: ocasionally     Family History   Problem Relation Age of Onset     CANCER Mother      Breast Cancer Mother      Alzheimer Disease Father          Current Outpatient Prescriptions   Medication Sig Dispense Refill     zolpidem (AMBIEN) 10 MG tablet TAKE 1 TABLET BY MOUTH AT BEDTIME AS NEEDED FOR SLEEP 30 tablet 3      LORazepam (ATIVAN) 1 MG tablet TAKE 1/2 TO 1 TAB BY MOUTH EVERY 8 HOURS AS NEEDED FOR ANXIETY 30 tablet 3     Vitamin D, Cholecalciferol, 1000 UNITS CAPS Take 1 tablet by mouth daily 30 capsule 11     allopurinol (ZYLOPRIM) 100 MG tablet Take 1 tablet (100 mg) by mouth daily 90 tablet 2     Red Yeast Rice Extract ([employer] RED YEAST RICE) 600 MG CAPS Take 2 capsules by mouth 2 times daily (with meals) 120 capsule 11     Plant Sterols and Stanols 450 MG TABS Take 2 tablets by mouth 2 times daily 12 tablet PRN     Multiple Vitamins-Minerals (MULTI FOR HIM 50+) TABS Take 1 tablet by mouth daily 30 tablet 11     Ibuprofen (IBU PO) Take  by mouth.       indomethacin (INDOCIN) 25 MG capsule Take 1 capsule (25 mg) by mouth 2 times daily (with meals) (Patient not taking: Reported on 4/25/2017) 42 capsule 1     colchicine (COLCRYS) 0.6 MG tablet Take 1 tablet (0.6 mg) by mouth daily (Patient not taking: Reported on 4/25/2017) 90 tablet 3     albuterol (ALBUTEROL) 108 (90 BASE) MCG/ACT inhaler Inhale 2 puffs into the lungs every 4 hours as needed for shortness of breath / dyspnea (Patient not taking: Reported on 6/1/2017) 1 Inhaler 0     vardenafil (LEVITRA) 20 MG tablet Take 0.5-1 tablets (10-20 mg) by mouth daily as needed for erectile dysfunction Never use with nitroglycerin, terazosin or doxazosin. (Patient not taking: Reported on 4/25/2017) 6 tablet 11     Allergies   Allergen Reactions     Keflex [Cephalexin Hcl]      Recent Labs   Lab Test  10/10/16   1113  10/26/15   0839  10/01/14   0845   02/04/13   0835   LDL  135*  124  128   < >  140*   HDL  49  44  57   < >  41   TRIG  110  119  37   < >  123   ALT  20  19  21   < >  19   CR  1.28*   --   1.20   < >  1.30*   GFRESTIMATED  57*   --   62   < >  57*   GFRESTBLACK  69   --   75   < >  69   POTASSIUM  4.3   --   4.5   < >  4.5   TSH   --    --    --    --   1.95    < > = values in this interval not displayed.      BP Readings from Last 3 Encounters:   06/01/17 132/78  "  04/25/17 122/64   11/17/16 120/82    Wt Readings from Last 3 Encounters:   06/01/17 175 lb 8 oz (79.6 kg)   04/25/17 175 lb (79.4 kg)   11/17/16 173 lb (78.5 kg)                  Labs reviewed in EPIC        Reviewed and updated as needed this visit by clinical staff  Tobacco  Allergies  Med Hx  Surg Hx  Fam Hx  Soc Hx        Reviewed and updated as needed this visit by Provider           ROS: has Mixed hyperlipidemia; Mixed hearing loss, unilateral; Malignant neoplasm of prostate (H); Insomnia; Hyperlipidemia LDL goal <100; Gout; Vitamin D insufficiency; Impotence of organic origin; History of prostate cancer; History of gout; Anxiety; Transient insomnia; Routine general medical examination at a health care facility; ACP (advance care planning); BPV (benign positional vertigo), left; BPV (benign positional vertigo), right; and DDD (degenerative disc disease), lumbar on his problem list.    Review Of Systems  Skin: negative  Eyes: negative  Ears/Nose/Throat: negative  BENIGN POSITIONAL VERTIGO SEE HISTORY OF PRESENT ILLNESS   Respiratory: No shortness of breath, dyspnea on exertion, cough, or hemoptysis  Cardiovascular: negative LDL OR \"BAD\" CHOLESTEROL  GOAL < 100   TRIGLYCERIDES   Gastrointestinal: negative  Genitourinary: negative HISTORY OF PROSTATE CANCER  SEXUAL DYSFUNCTION   Musculoskeletal: back pain and  WITHOUT RADICULOPATHY   RIGHT GREATER THAN LEFT   MAGNETIC RESONANCE IMAGING DISCOGENIC INVOLVEMENT   Neurologic: negative  Psychiatric: negative INSOMNIA   Hematologic/Lymphatic/Immunologic: negative  Endocrine: negative      OBJECTIVE:                                                    /78  Pulse 79  Temp 97.8  F (36.6  C) (Tympanic)  Resp 16  Wt 175 lb 8 oz (79.6 kg)  SpO2 97%  BMI 26.3 kg/m2  Body mass index is 26.3 kg/(m^2).  GENERAL: healthy, alert and no distress  HALLPIKE MANEUVER POSITIVE LEFT MORE THAN RIGHT with NYSTAGMUS   EPLEY MANEUVER TAUGHT TO PATIENT AND YOUTUBE AS WELL "   TREATMENT PROGNOSIS BENEFITS AND RISKS DISCUSSED   SIDE EFFECTS BENEFITS AND RISKS DISCUSSED      NECK: no adenopathy, no asymmetry, masses, or scars and thyroid normal to palpation  RESP: lungs clear to auscultation - no rales, rhonchi or wheezes  CV: regular rate and rhythm, normal S1 S2, no S3 or S4, no murmur, click or rub, no peripheral edema and peripheral pulses strong  ABDOMEN: soft, nontender, no hepatosplenomegaly, no masses and bowel sounds normal  MS: RANGE OF MOTION OF BACK  DECREASE RANGE OF MOTION FLEXION   MUSCLE SPASM  SACROILIAC JOINT TENDERNESS: WITHIN NORMAL LIMITS   FLEXION:  DECREASE RANGE OF MOTION   EXTENSION:  NORMAL   LATERAL BENDING:  NORMAL   ROTATION  NORMAL   HEEL WALK:  NORMAL   SKIN EXAM  NNL   UPPER BACK RANGE OF MOTION  NORMAL   DEFORMITIES  NORMAL NORMAL   STRAIGHT LEG RAISING TEST  NEGATIVE STRAIGHT LEG RAISING TEST   FEMORAL STRETCH TEST  NORMAL   REFLEXES ANKLES AND KNEES  NORMAL   NUMBNESS:  NORMAL    TENDERNESS: RIGHT MORE THAN LEFT   PRONE PRESSUPS  NORMAL   FLEXION IN SUPINE POSITION  NOT APPLICABLE   JIM'S TEST  NOT APPLICABLE    SKIN: no suspicious lesions or rashes  NEURO: Normal strength and tone, mentation intact and speech normal  PSYCH: mentation appears normal, affect normal/bright    Diagnostic Test Results:  Results for orders placed or performed in visit on 11/17/16   XR Lumbar Spine 2/3 Views    Narrative    LUMBAR SPINE TWO TO THREE VIEWS  11/17/2016 1:54 PM     HISTORY: Low back pain.    COMPARISON: None.      Impression    IMPRESSION: Lumbar vertebrae are normally aligned. No compression  deformity or acute fracture.    DAT POLANCO MD        ASSESSMENT/PLAN:                                                          ICD-10-CM    1. BPV (benign positional vertigo), left H81.12    2. BPV (benign positional vertigo), right H81.11    3. DDD (degenerative disc disease), lumbar M51.36          Patient Instructions   (H81.12) BPV (benign positional vertigo),  left  (primary encounter diagnosis)  Comment:  HALLPIKE MANEUVER POSITIVE LEFT MORE THAN RIGHT with NYSTAGMUS   EPLEY MANEUVER TAUGHT TO PATIENT AND YOUTUBE AS WELL   TREATMENT PROGNOSIS BENEFITS AND RISKS DISCUSSED   SIDE EFFECTS BENEFITS AND RISKS DISCUSSED    RETURN TO CLINIC  2 WEEKS IF NOT BETTER   Plan:   TARSHA LUU JR., MD     (H81.11) BPV (benign positional vertigo), right  Comment:    Plan:      (M51.36) DDD (degenerative disc disease), lumbar  Comment:    Plan:      Assessment: Lower back pain    Plan: do these exercises at least twice daily:  Standing or sitting exercise can be done every two hours    Justino' Flexion Versus Campbell   Extension Exercises For   Low Back Pain   Examples of Justino' Flexion Exercises  1. Pelvic tilt.  Please press the small of your back against the floor.  Start with 5-10  and increase to 100 count over one month   2. Single Knee to chest. Lie on your back with legs in bent position. Alternate one leg and the other very slowly bringing the knee to chest.  Start with a count of 5-10   Over one month work up to 100  3. Double knee to chest.  Lie on your back with knees in bent position.  Bring both knees to the chest slowly hold for a count of 5-to 10. Over one month work to 100  4. Partial sit-up or crunch.  Lie on your back in bent leg position.  Please bring your body with arms crossed in front  To 30 degrees of flexion. Start with 5-10 over one month work up to 100 or more  5. Sit back.  Please sit on the side of the bed or a stair landing  And lie backwards until the abdominal muscles start to quiver.  Hold for a count of 5-10 and over a month work up to 100.  5. Hamstring stretch.  Please extend your legs while sitting on the floor as tolerated for 5-10 count.  Gradually increase to count of 30 over one month      Alternately find a stair landing or sturdy chair and place heel  In a comfortable level of extension  And stretch one hamstring at a time for 5-10  seconds.  Increase to count of 30 over one month                         Squat.  Stand with legs comfortably apart and lower the body slowly by flexing the knees  for count of 5-10 over one month increase to 30.  Useful for anterior disc protrusion, facette joint arthritis spond-10ylolysis, spondylolisthesis and spinal stenosis    Julissa method or extension exercises  Useful disc bulging posteriorly  1. Prone pressups  Please lie on your abdomen.   Please do a push with the upper half of your body only.  This should not cause the pain to shoot down your buttock thigh leg or foot  Start with 10 and repeat x 3 one minute apart.  Repeat x 10 every 1-2 hours  Pain tends to increase in the center of back  And leaves buttock thighs legs and foot over time  You may shift your pelvis in opposite direction of buttock and leg pain to achieve even better results  2. Superman with arms extended  Or at the side Simultaneously lift your arms and legs off the floor. Start with 5-10 over one month increase to 100.  3. Cat stretch or cobra Start  As if in extended position of prone pressup but hold the stretch for 5 or 10 count. Over one moth to count of 30.  4.  Extensions can be done in standing position  As well if prone pressup is inconvenient.  Shift your pelvis in opposite direction of limb pain.  Put your hands  Flat on your buttocks and lean backwards without loss of balance.  Count 10 x 3 times then 10 extensions hourly   FIT BIT TWITCH RECOMMENDED    WALKING 10,000 STEPS A DAY           TARSHA LUU MD    [facility]

## 2017-06-01 NOTE — PATIENT INSTRUCTIONS
(H81.12) BPV (benign positional vertigo), left  (primary encounter diagnosis)  Comment:  HALLPIKE MANEUVER POSITIVE LEFT MORE THAN RIGHT with NYSTAGMUS   EPLEY MANEUVER TAUGHT TO PATIENT AND YOUTUBE AS WELL   TREATMENT PROGNOSIS BENEFITS AND RISKS DISCUSSED   SIDE EFFECTS BENEFITS AND RISKS DISCUSSED    RETURN TO CLINIC  2 WEEKS IF NOT BETTER   Plan:   TARSHA LUU JR., MD     (H81.11) BPV (benign positional vertigo), right  Comment:    Plan:      (M51.36) DDD (degenerative disc disease), lumbar  Comment:    Plan:      Assessment: Lower back pain    Plan: do these exercises at least twice daily:  Standing or sitting exercise can be done every two hours    Justino' Flexion Versus Campbell   Extension Exercises For   Low Back Pain   Examples of Justino' Flexion Exercises  1. Pelvic tilt.  Please press the small of your back against the floor.  Start with 5-10  and increase to 100 count over one month   2. Single Knee to chest. Lie on your back with legs in bent position. Alternate one leg and the other very slowly bringing the knee to chest.  Start with a count of 5-10   Over one month work up to 100  3. Double knee to chest.  Lie on your back with knees in bent position.  Bring both knees to the chest slowly hold for a count of 5-to 10. Over one month work to 100  4. Partial sit-up or crunch.  Lie on your back in bent leg position.  Please bring your body with arms crossed in front  To 30 degrees of flexion. Start with 5-10 over one month work up to 100 or more  5. Sit back.  Please sit on the side of the bed or a stair landing  And lie backwards until the abdominal muscles start to quiver.  Hold for a count of 5-10 and over a month work up to 100.  5. Hamstring stretch.  Please extend your legs while sitting on the floor as tolerated for 5-10 count.  Gradually increase to count of 30 over one month      Alternately find a stair landing or sturdy chair and place heel  In a comfortable level of extension  And  stretch one hamstring at a time for 5-10 seconds.  Increase to count of 30 over one month                         Squat.  Stand with legs comfortably apart and lower the body slowly by flexing the knees  for count of 5-10 over one month increase to 30.  Useful for anterior disc protrusion, facette joint arthritis spond-10ylolysis, spondylolisthesis and spinal stenosis    Julissa method or extension exercises  Useful disc bulging posteriorly  1. Prone pressups  Please lie on your abdomen.   Please do a push with the upper half of your body only.  This should not cause the pain to shoot down your buttock thigh leg or foot  Start with 10 and repeat x 3 one minute apart.  Repeat x 10 every 1-2 hours  Pain tends to increase in the center of back  And leaves buttock thighs legs and foot over time  You may shift your pelvis in opposite direction of buttock and leg pain to achieve even better results  2. Superman with arms extended  Or at the side Simultaneously lift your arms and legs off the floor. Start with 5-10 over one month increase to 100.  3. Cat stretch or cobra Start  As if in extended position of prone pressup but hold the stretch for 5 or 10 count. Over one moth to count of 30.  4.  Extensions can be done in standing position  As well if prone pressup is inconvenient.  Shift your pelvis in opposite direction of limb pain.  Put your hands  Flat on your buttocks and lean backwards without loss of balance.  Count 10 x 3 times then 10 extensions hourly   FIT BIT TWITCH RECOMMENDED    WALKING 10,000 STEPS A DAY

## 2017-06-23 ENCOUNTER — TELEPHONE (OUTPATIENT)
Dept: FAMILY MEDICINE | Facility: CLINIC | Age: 62
End: 2017-06-23

## 2017-06-23 DIAGNOSIS — M51.369 DDD (DEGENERATIVE DISC DISEASE), LUMBAR: Primary | ICD-10-CM

## 2017-06-23 NOTE — TELEPHONE ENCOUNTER
Call Back (Patient would like to speak to a nurse to discuss getting some prednisone called in to his pharmacy.)       Battling bad back since October 2016, seen [facility] for it, had MRI, doing exercises but not helping. Says girlfriend had a course of prednisone really worked for her back pain and so the patient wanted to try it.     Rita Fowler RN  06/23/17  2:07 PM

## 2017-07-03 DIAGNOSIS — M1A.00X0 IDIOPATHIC CHRONIC GOUT WITHOUT TOPHUS, UNSPECIFIED SITE: ICD-10-CM

## 2017-07-03 NOTE — TELEPHONE ENCOUNTER
ALLOPURINOL 100 MG TABLET       Last Written Prescription Date: 10/10/2016  Last Fill Quantity: 90, # refills: 2  Last Office Visit with [facility], [facility] or [facility] prescribing provider:  6/1/2017        Uric Acid   Date Value Ref Range Status   10/26/2015 8.2 3.5 - 8.5 mg/dL Final   ]  Creatinine   Date Value Ref Range Status   10/10/2016 1.28 (H) 0.66 - 1.25 mg/dL Final   ]  Lab Results   Component Value Date    WBC 5.8 10/26/2015     Lab Results   Component Value Date    RBC 5.25 10/26/2015     Lab Results   Component Value Date    HGB 15.7 10/26/2015     Lab Results   Component Value Date    HCT 44.7 10/26/2015     No components found for: MCT  Lab Results   Component Value Date    MCV 85 10/26/2015     Lab Results   Component Value Date    MCH 29.9 10/26/2015     Lab Results   Component Value Date    MCHC 35.1 10/26/2015     Lab Results   Component Value Date    RDW 12.5 10/26/2015     Lab Results   Component Value Date     10/26/2015     Lab Results   Component Value Date    AST 22 02/24/2014     Lab Results   Component Value Date    ALT 20 10/10/2016

## 2017-07-03 NOTE — TELEPHONE ENCOUNTER
Routing refill request to provider for review/approval because:  Labs not current:  CBC, uric acid, ast

## 2017-07-05 RX ORDER — ALLOPURINOL 100 MG/1
TABLET ORAL
Qty: 90 TABLET | Refills: 0 | Status: SHIPPED | OUTPATIENT
Start: 2017-07-05 | End: 2017-09-29

## 2017-07-05 NOTE — TELEPHONE ENCOUNTER
Patient was informed of message below. He will hold on lab appointment since he prefers to have labs done at physical due to insurance coverage. He agreed to call back and scheduled physical.

## 2017-07-05 NOTE — TELEPHONE ENCOUNTER
Medication refills given. Patient due for labs, future lab orders signed. Please have patient schedule lab-only appointment.

## 2017-07-05 NOTE — TELEPHONE ENCOUNTER
Patient is requesting a call from provider to discuss back pain. States pain is chronic and PCP has Tx for over 8 months . Exercises offer short term relief.  Pt would like to  try Rx for prednisone. Advised OV. Informed pt PCP is out of the office and would not be back until 07/13/2017. Patient declines OV due to cost of appoinment. He plans to schedule physical 10/2017. Ok to wait until PCP is back for his response on Rx. Please advice.

## 2017-07-16 RX ORDER — PREDNISONE 20 MG/1
20 TABLET ORAL 2 TIMES DAILY
Qty: 14 TABLET | Refills: 0 | Status: SHIPPED | OUTPATIENT
Start: 2017-07-16 | End: 2018-08-21

## 2017-07-17 NOTE — TELEPHONE ENCOUNTER
Patient Contact    Attempt # 1    Was call answered?  No.  Left message on voicemail with information to call me back.

## 2017-07-17 NOTE — TELEPHONE ENCOUNTER
OFFICE VISIT RECOMMENDED     SUGGEST WALKING 32398 STEPS PER DAY    Assessment: Lower back pain    Plan: do these exercises at least twice daily:  Standing or sitting exercise can be done every two hours    Justino' Flexion Versus Julissa   Extension Exercises For   Low Back Pain   Examples of Justino' Flexion Exercises  1. Pelvic tilt.  Please press the small of your back against the floor.  Start with 5-10  and increase to 100 count over one month   2. Single Knee to chest. Lie on your back with legs in bent position. Alternate one leg and the other very slowly bringing the knee to chest.  Start with a count of 5-10   Over one month work up to 100  3. Double knee to chest.  Lie on your back with knees in bent position.  Bring both knees to the chest slowly hold for a count of 5-to 10. Over one month work to 100  4. Partial sit-up or crunch.  Lie on your back in bent leg position.  Please bring your body with arms crossed in front  To 30 degrees of flexion. Start with 5-10 over one month work up to 100 or more  5. Sit back.  Please sit on the side of the bed or a stair landing  And lie backwards until the abdominal muscles start to quiver.  Hold for a count of 5-10 and over a month work up to 100.  5. Hamstring stretch.  Please extend your legs while sitting on the floor as tolerated for 5-10 count.  Gradually increase to count of 30 over one month      Alternately find a stair landing or sturdy chair and place heel  In a comfortable level of extension  And stretch one hamstring at a time for 5-10 seconds.  Increase to count of 30 over one month                         Squat.  Stand with legs comfortably apart and lower the body slowly by flexing the knees  for count of 5-10 over one month increase to 30.  Useful for anterior disc protrusion, facette joint arthritis spond-10ylolysis, spondylolisthesis and spinal stenosis    Julissa method or extension exercises  Useful disc bulging posteriorly  1. Prone  pressups  Please lie on your abdomen.   Please do a push with the upper half of your body only.  This should not cause the pain to shoot down your buttock thigh leg or foot  Start with 10 and repeat x 3 one minute apart.  Repeat x 10 every 1-2 hours  Pain tends to increase in the center of back  And leaves buttock thighs legs and foot over time  You may shift your pelvis in opposite direction of buttock and leg pain to achieve even better results  2. Superman with arms extended  Or at the side Simultaneously lift your arms and legs off the floor. Start with 5-10 over one month increase to 100.  3. Cat stretch or cobra Start  As if in extended position of prone pressup but hold the stretch for 5 or 10 count. Over one moth to count of 30.  4.  Extensions can be done in standing position  As well if prone pressup is inconvenient.  Shift your pelvis in opposite direction of limb pain.  Put your hands  Flat on your buttocks and lean backwards without loss of balance.  Count 10 x 3 times then 10 extensions hourly   PREDNISONE 20MG PO TWICE DAILY X 7DAYS      TARSHA LUU JR., MD

## 2017-08-30 DIAGNOSIS — F41.9 ANXIETY: ICD-10-CM

## 2017-08-30 DIAGNOSIS — F51.02 TRANSIENT INSOMNIA: ICD-10-CM

## 2017-08-30 DIAGNOSIS — G47.00 INSOMNIA: ICD-10-CM

## 2017-08-30 NOTE — TELEPHONE ENCOUNTER
Controlled Substance Refill Request for Zolpidem Tartrate, Lorazepam  Problem List Complete:  No     PROVIDER TO CONSIDER COMPLETION OF PROBLEM LIST AND OVERVIEW/CONTROLLED SUBSTANCE AGREEMENT        Controlled substance agreement on file: No.     Processing:  Fax Rx to [facility] pharmacy     checked in past 6 months?  Yes 08/30/2017 - no concerns

## 2017-08-30 NOTE — TELEPHONE ENCOUNTER
Zolpidem Tartrate      Last Written Prescription Date:  5/5/17  Last Fill Quantity: 30,   # refills: 3  Last Office Visit with [facility], [facility] or [facility] prescribing provider: 6/1/17  Future Office visit:    Next 5 appointments (look out 90 days)     Oct 12, 2017  8:30 AM CDT   [facility] with Stu Delacruz [facility]   [facility]    [address] [place] [id number]   [phone]                   Routing refill request to provider for review/approval because:  Drug not on the [facility], [facility] or [facility] refill protocol or controlled substance    Lorazepam      Last Written Prescription Date:  5/5/17  Last Fill Quantity: 30,   # refills: 3  Last Office Visit with [facility], [facility] or [facility] prescribing provider: 6/1/17  Future Office visit:    Next 5 appointments (look out 90 days)     Oct 12, 2017  8:30 AM CDT   PHYSICAL with Stu [facility]acruz MD   [facility]    [address] [place] [id number]   [phone]                   Routing refill request to provider for review/approval because:  Drug not on the [facility], [facility] or [facility] refill protocol or controlled substance

## 2017-08-31 RX ORDER — LORAZEPAM 1 MG/1
TABLET ORAL
Qty: 30 TABLET | Refills: 1 | Status: SHIPPED | OUTPATIENT
Start: 2017-08-31 | End: 2017-09-27

## 2017-08-31 RX ORDER — ZOLPIDEM TARTRATE 10 MG/1
TABLET ORAL
Qty: 30 TABLET | Refills: 1 | Status: SHIPPED | OUTPATIENT
Start: 2017-08-31 | End: 2017-11-06

## 2017-08-31 NOTE — TELEPHONE ENCOUNTER
RX for lorazapam and zolpidem have been faxed to [facility] [phone]

## 2017-09-27 DIAGNOSIS — F41.9 ANXIETY: ICD-10-CM

## 2017-09-28 NOTE — TELEPHONE ENCOUNTER
Controlled Substance Refill Request for LORazepam (ATIVAN) 1 MG tablet  Problem List Complete:  No     PROVIDER TO CONSIDER COMPLETION OF PROBLEM LIST AND OVERVIEW/CONTROLLED SUBSTANCE AGREEMENT    Last Written Prescription Date:  08/31/2017  Last Fill Quantity: 30,   # refills: 1    Last Office Visit with [facility] primary care provider: 06/01/2017    Future Office visit:   Next 5 appointments (look out 90 days)     Oct 12, 2017  8:30 AM CDT   PHYSICAL with Stu Delacruz MD   [facility]    [address] [place] [id number] [phone]                  Controlled substance agreement on file: No.     Processing:  Fax Rx to [facility] pharmacy     checked in past 6 months?  Yes 08/30/2017- no concerns

## 2017-09-29 DIAGNOSIS — M1A.00X0 IDIOPATHIC CHRONIC GOUT WITHOUT TOPHUS, UNSPECIFIED SITE: ICD-10-CM

## 2017-09-29 RX ORDER — ALLOPURINOL 100 MG/1
TABLET ORAL
Qty: 90 TABLET | Refills: 0 | Status: SHIPPED | OUTPATIENT
Start: 2017-09-29 | End: 2017-11-30

## 2017-09-29 RX ORDER — LORAZEPAM 1 MG/1
TABLET ORAL
Qty: 30 TABLET | Refills: 1 | Status: SHIPPED | OUTPATIENT
Start: 2017-09-29 | End: 2017-11-30

## 2017-09-29 NOTE — TELEPHONE ENCOUNTER
Allopurinol (zyloprim) 100 mg     Last Written Prescription Date: 7/5/17  Last Fill Quantity: 90, # refills: 0  Last Office Visit with [facility] or [facility] prescribing provider:  6/1/17   Next 5 appointments (look out 90 days)     Oct 12, 2017  8:30 AM CDT   [facility]    [address] [place] [id number] [phone]                   Uric Acid   Date Value Ref Range Status   10/26/2015 8.2 3.5 - 8.5 mg/dL Final   ]  Creatinine   Date Value Ref Range Status   10/10/2016 1.28 (H) 0.66 - 1.25 mg/dL Final   ]  Lab Results   Component Value Date    WBC 5.8 10/26/2015     Lab Results   Component Value Date    RBC 5.25 10/26/2015     Lab Results   Component Value Date    HGB 15.7 10/26/2015     Lab Results   Component Value Date    HCT 44.7 10/26/2015     No components found for: MCT  Lab Results   Component Value Date    MCV 85 10/26/2015     Lab Results   Component Value Date    MCH 29.9 10/26/2015     Lab Results   Component Value Date    MCHC 35.1 10/26/2015     Lab Results   Component Value Date    RDW 12.5 10/26/2015     Lab Results   Component Value Date     10/26/2015     Lab Results   Component Value Date    AST 22 02/24/2014     Lab Results   Component Value Date    ALT 20 10/10/2016

## 2017-10-10 ENCOUNTER — MYC MEDICAL ADVICE (OUTPATIENT)
Dept: FAMILY MEDICINE | Facility: CLINIC | Age: 62
End: 2017-10-10

## 2017-10-12 ENCOUNTER — OFFICE VISIT (OUTPATIENT)
Dept: FAMILY MEDICINE | Facility: CLINIC | Age: 62
End: 2017-10-12
Payer: COMMERCIAL

## 2017-10-12 VITALS
BODY MASS INDEX: 25.92 KG/M2 | DIASTOLIC BLOOD PRESSURE: 80 MMHG | TEMPERATURE: 98 F | HEIGHT: 69 IN | HEART RATE: 68 BPM | OXYGEN SATURATION: 95 % | WEIGHT: 175 LBS | RESPIRATION RATE: 16 BRPM | SYSTOLIC BLOOD PRESSURE: 114 MMHG

## 2017-10-12 DIAGNOSIS — H81.12 BPV (BENIGN POSITIONAL VERTIGO), LEFT: ICD-10-CM

## 2017-10-12 DIAGNOSIS — Z87.39 HISTORY OF GOUT: ICD-10-CM

## 2017-10-12 DIAGNOSIS — E78.2 MIXED HYPERLIPIDEMIA: ICD-10-CM

## 2017-10-12 DIAGNOSIS — E78.5 HYPERLIPIDEMIA LDL GOAL <100: ICD-10-CM

## 2017-10-12 DIAGNOSIS — N52.9 IMPOTENCE OF ORGANIC ORIGIN: ICD-10-CM

## 2017-10-12 DIAGNOSIS — Z85.46 HISTORY OF PROSTATE CANCER: ICD-10-CM

## 2017-10-12 DIAGNOSIS — G89.29 CHRONIC RIGHT-SIDED LOW BACK PAIN WITHOUT SCIATICA: ICD-10-CM

## 2017-10-12 DIAGNOSIS — Z00.00 ROUTINE HISTORY AND PHYSICAL EXAMINATION OF ADULT: Primary | ICD-10-CM

## 2017-10-12 DIAGNOSIS — E55.9 VITAMIN D INSUFFICIENCY: Chronic | ICD-10-CM

## 2017-10-12 DIAGNOSIS — F41.9 ANXIETY: ICD-10-CM

## 2017-10-12 DIAGNOSIS — M54.50 CHRONIC RIGHT-SIDED LOW BACK PAIN WITHOUT SCIATICA: ICD-10-CM

## 2017-10-12 DIAGNOSIS — Z86.002 H/O CARCINOMA IN SITU OF PROSTATE: ICD-10-CM

## 2017-10-12 DIAGNOSIS — F51.02 TRANSIENT INSOMNIA: ICD-10-CM

## 2017-10-12 DIAGNOSIS — H90.8 MIXED HEARING LOSS, UNILATERAL: ICD-10-CM

## 2017-10-12 DIAGNOSIS — E55.9 VITAMIN D DEFICIENCY: ICD-10-CM

## 2017-10-12 PROCEDURE — 82306 VITAMIN D 25 HYDROXY: CPT | Performed by: FAMILY MEDICINE

## 2017-10-12 PROCEDURE — 80048 BASIC METABOLIC PNL TOTAL CA: CPT | Performed by: FAMILY MEDICINE

## 2017-10-12 PROCEDURE — 36415 COLL VENOUS BLD VENIPUNCTURE: CPT | Performed by: FAMILY MEDICINE

## 2017-10-12 PROCEDURE — 99396 PREV VISIT EST AGE 40-64: CPT | Performed by: FAMILY MEDICINE

## 2017-10-12 PROCEDURE — 80061 LIPID PANEL: CPT | Performed by: FAMILY MEDICINE

## 2017-10-12 PROCEDURE — G0103 PSA SCREENING: HCPCS | Performed by: FAMILY MEDICINE

## 2017-10-12 RX ORDER — DIPHENOXYLATE HYDROCHLORIDE AND ATROPINE SULFATE 2.5; .025 MG/1; MG/1
TABLET ORAL
COMMUNITY
End: 2018-09-10

## 2017-10-12 RX ORDER — ALLOPURINOL 100 MG/1
100 TABLET ORAL
COMMUNITY
Start: 2017-07-05 | End: 2018-09-10

## 2017-10-12 RX ORDER — LIDOCAINE 40 MG/G
CREAM TOPICAL
Qty: 1 TUBE | Refills: 0 | Status: SHIPPED | OUTPATIENT
Start: 2017-10-12 | End: 2018-09-10

## 2017-10-12 RX ORDER — BACLOFEN 10 MG/1
10 TABLET ORAL 3 TIMES DAILY
Qty: 90 TABLET | Refills: 3 | Status: SHIPPED | OUTPATIENT
Start: 2017-10-12 | End: 2018-09-10

## 2017-10-12 NOTE — PROGRESS NOTES
"SUBJECTIVE:   CC: Sergio Estevez is an 62 year old male who presents for preventative health visit.     Physical   Annual:     Getting at least 3 servings of Calcium per day::  Yes    Bi-annual eye exam::  Yes    Dental care twice a year::  Yes    Sleep apnea or symptoms of sleep apnea::  None    Diet::  Regular (no restrictions)    Frequency of exercise::  2-3 days/week    Duration of exercise::  45-60 minutes    Taking medications regularly::  Yes    Medication side effects::  None    Additional concerns today::  No      LDL OR \"BAD\" CHOLESTEROL  GOAL < 100 AND NORMAL TRIGLYCERIDES     LEFT HEARING MOVEMENT     HISTORY OF PROSTATIC CARCINOMA X 9 YEARS AGO COMPLETE REMISSION     VITAMIN D REPLACEMENT      GOUT     ANXIETY   TRANSIENT INSOMNIA     BENIGN POSITIONAL VERTIGO IMPROVED     OSTEOARTHRITIS LUMBAR SPINE  WANTS SPECIALTY CONSULTATION       Today's PHQ-2 Score:   PHQ-2 ( 1999 [employer]) 10/12/2017   Q1: Little interest or pleasure in doing things 0   Q2: Feeling down, depressed or hopeless 0   PHQ-2 Score 0   Q1: Little interest or pleasure in doing things Not at all   Q2: Feeling down, depressed or hopeless Not at all   PHQ-2 Score 0       Abuse: Current or Past(Physical, Sexual or Emotional)- No  Do you feel safe in your environment - Yes    Social History   Substance Use Topics     Smoking status: Former Smoker     Quit date: 11/3/1982     Smokeless tobacco: Never Used     Alcohol use Yes      Comment: ocasionally     The patient does not drink >3 drinks per day nor >7 drinks per week.    Last PSA:   PSA   Date Value Ref Range Status   10/10/2016 <0.07 0 - 4 ug/L Final       Reviewed orders with patient. Reviewed health maintenance and updated orders accordingly - Yes  Labs reviewed in EPIC  Patient Active Problem List   Diagnosis     Mixed hyperlipidemia     Mixed hearing loss, unilateral     Malignant neoplasm of prostate (H)     Insomnia     Hyperlipidemia LDL goal <100     Gout     Vitamin D " insufficiency     Impotence of organic origin     History of prostate cancer     History of gout     Anxiety     Transient insomnia     Routine general medical examination at a health care facility     ACP (advance care planning)     BPV (benign positional vertigo), left     BPV (benign positional vertigo), right     DDD (degenerative disc disease), lumbar     Past Surgical History:   Procedure Laterality Date     OTHER SURGICAL HISTORY  1976    4 toes amputated from industrial accident       Social History   Substance Use Topics     Smoking status: Former Smoker     Quit date: 11/3/1982     Smokeless tobacco: Never Used     Alcohol use Yes      Comment: ocasionally     Family History   Problem Relation Age of Onset     CANCER Mother      Breast Cancer Mother      Alzheimer Disease Father          Allergies   Allergen Reactions     Keflex [Cephalexin Hcl]      Recent Labs   Lab Test  10/10/16   1113  10/26/15   0839  10/01/14   0845   02/04/13   0835   LDL  135*  124  128   < >  140*   HDL  49  44  57   < >  41   TRIG  110  119  37   < >  123   ALT  20  19  21   < >  19   CR  1.28*   --   1.20   < >  1.30*   GFRESTIMATED  57*   --   62   < >  57*   GFRESTBLACK  69   --   75   < >  69   POTASSIUM  4.3   --   4.5   < >  4.5   TSH   --    --    --    --   1.95    < > = values in this interval not displayed.            Reviewed and updated as needed this visit by clinical staffTobacco  Allergies  Med Hx  Surg Hx  Fam Hx  Soc Hx        Reviewed and updated as needed this visit by Provider        Past Medical History:   Diagnosis Date     Bronchitis      DDD (degenerative disc disease), lumbar 6/1/2017     Malignant neoplasm (H) prostate     Pneumonia       Past Surgical History:   Procedure Laterality Date     OTHER SURGICAL HISTORY  1976    4 toes amputated from industrial accident       ROS: has Mixed hyperlipidemia; Mixed hearing loss, unilateral; Malignant neoplasm of prostate (H); Insomnia; Hyperlipidemia LDL  "goal <100; Gout; Vitamin D insufficiency; Impotence of organic origin; History of prostate cancer; History of gout; Anxiety; Transient insomnia; Routine general medical examination at a health care facility; ACP (advance care planning); BPV (benign positional vertigo), left; BPV (benign positional vertigo), right; and DDD (degenerative disc disease), lumbar on his problem list.    C: NEGATIVE for fever, chills, change in weight  I: NEGATIVE for worrisome rashes, moles or lesions  E: NEGATIVE for vision changes or irritation  ENT: NEGATIVE for ear, mouth and throat problems  R: NEGATIVE for significant cough or SOB  CV: NEGATIVE for chest pain, palpitations or peripheral edema  GI: NEGATIVE for nausea, abdominal pain, heartburn, or change in bowel habits   male: negative for dysuria, hematuria, decreased urinary stream, erectile dysfunction, urethral discharge  M: NEGATIVE for significant arthralgias or myalgia  N: NEGATIVE for weakness, dizziness or paresthesias  E: NEGATIVE for temperature intolerance, skin/hair changes  H: NEGATIVE for bleeding problems  P: NEGATIVE for changes in mood or affect    OBJECTIVE:   /80  Pulse 68  Temp 98  F (36.7  C)  Resp 16  Ht 5' 9\" (1.753 m)  Wt 175 lb (79.4 kg)  SpO2 95%  BMI 25.84 kg/m2    EXAM:  GENERAL: healthy, alert and no distress  EYES: Eyes grossly normal to inspection, PERRL and conjunctivae and sclerae normal  HENT: ear canals and TM's normal, nose and mouth without ulcers or lesions  NECK: no adenopathy, no asymmetry, masses, or scars and thyroid normal to palpation  RESP: lungs clear to auscultation - no rales, rhonchi or wheezes  CV: regular rate and rhythm, normal S1 S2, no S3 or S4, no murmur, click or rub, no peripheral edema and peripheral pulses strong  ABDOMEN: soft, nontender, no hepatosplenomegaly, no masses and bowel sounds normal   (male): normal male genitalia without lesions or urethral discharge, no hernia  MS: no gross musculoskeletal " defects noted, no edema  AMPUTATION LEFT OUTER FOREFOOT   LARGE TOE IS SPARED  OLD OIL RIG INJURY   SKIN: no suspicious lesions or rashes  NEURO: Normal strength and tone, mentation intact and speech normal  PSYCH: mentation appears normal, affect normal/bright  LYMPH: no cervical, supraclavicular, axillary, or inguinal adenopathy  Diabetic foot exam: normal DP and PT pulses, no trophic changes or ulcerative lesions, normal sensory exam and normal monofilament exam    ASSESSMENT/PLAN:       ICD-10-CM    1. Routine history and physical examination of adult Z00.00    2. Chronic right-sided low back pain without sciatica M54.5 baclofen (LIORESAL) 10 MG tablet    G89.29 lidocaine (LMX4) 4 % CREA cream     ORTHO  REFERRAL     Basic metabolic panel    FACETTE JOINT AND  MILD DISC BULGING   3. Impotence of organic origin N52.9    4. Hyperlipidemia LDL goal <100 E78.5 Lipid panel reflex to direct LDL   5. Mixed hyperlipidemia E78.2 Lipid panel reflex to direct LDL   6. Mixed hearing loss, unilateral H90.8    7. History of prostate cancer Z85.46    8. History of gout Z87.39    9. Anxiety F41.9    10. Transient insomnia F51.02    11. BPV (benign positional vertigo), left H81.12    12. Vitamin D insufficiency E55.9 Vitamin D Deficiency   13. Vitamin D deficiency E55.9 Vitamin D Deficiency   14. H/O carcinoma in situ of prostate Z86.008 Prostate spec antigen screen       COUNSELING:   Reviewed preventive health counseling, as reflected in patient instructions       Regular exercise       Healthy diet/nutrition       Vision screening       Hearing screening      Patient Instructions     Preventive Health Recommendations  Male Ages 50 - 64    Yearly exam:             See your health care provider every year in order to  o   Review health changes.   o   Discuss preventive care.    o   Review your medicines if your doctor has prescribed any.     Have a cholesterol test every 5 years, or more frequently if you are at risk  for high cholesterol/heart disease.     Have a diabetes test (fasting glucose) every three years. If you are at risk for diabetes, you should have this test more often.     Have a colonoscopy at age 50, or have a yearly FIT test (stool test). These exams will check for colon cancer.      Talk with your health care provider about whether or not a prostate cancer screening test (PSA) is right for you.    You should be tested each year for STDs (sexually transmitted diseases), if you re at risk.     Shots: Get a flu shot each year. Get a tetanus shot every 10 years.     Nutrition:    Eat at least 5 servings of fruits and vegetables daily.     Eat whole-grain bread, whole-wheat pasta and brown rice instead of white grains and rice.     Talk to your provider about Calcium and Vitamin D.     Lifestyle    Exercise for at least 150 minutes a week (30 minutes a day, 5 days a week). This will help you control your weight and prevent disease.     Limit alcohol to one drink per day.     No smoking.     Wear sunscreen to prevent skin cancer.     See your dentist every six months for an exam and cleaning.     See your eye doctor every 1 to 2 years.    (Z00.00) Routine history and physical examination of adult  (primary encounter diagnosis)    Comment:      Plan:          (M54.5,  G89.29) Chronic right-sided low back pain without sciatica    Comment: FACETTE JOINT AND  MILD DISC BULGING    Plan: baclofen (LIORESAL) 10 MG tablet    1/4 TO TABLET AT HOUR OF SLEEP  MAXIMAL FOUR TIMES DAILY     , lidocaine       (LMX4) 4 % CREA cream,  UP TO 4 X DAILY    ORTHO        REFERRAL, Basic metabolic panel                   (N52.9) Impotence of organic origin    Comment:      Plan:          (E78.5) Hyperlipidemia LDL goal <100    Comment:      Plan: Lipid panel reflex to direct LDL                    (E78.2) Mixed hyperlipidemia    Comment:          Plan: Lipid panel reflex to direct LDL                    (H90.8) Mixed hearing loss,  "unilateral    Comment:      Plan:          (Z85.46) History of prostate cancer    Comment:      Plan:          (Z87.39) History of gout    Comment:      Plan:          (F41.9) Anxiety    Comment:      Plan:          (F51.02) Transient insomnia    Comment:       Plan:          (H81.12) BPV (benign positional vertigo), left    Comment:      Plan:          (E55.9) Vitamin D insufficiency    Comment:      Plan: Vitamin D Deficiency                   (E55.9) Vitamin D deficiency    Comment:      Plan: Vitamin D Deficiency                   (Z86.008) H/O carcinoma in situ of prostate    Comment:       Plan: Prostate spec antigen screen                              reports that he quit smoking about 34 years ago. He has never used smokeless tobacco.      Estimated body mass index is 25.84 kg/(m^2) as calculated from the following:    Height as of this encounter: 5' 9\" (1.753 m).    Weight as of this encounter: 175 lb (79.4 kg).         Counseling Resources:  ATP IV Guidelines  Pooled Cohorts Equation Calculator  FRAX Risk Assessment  ICSI Preventive Guidelines  Dietary Guidelines for Americans, 2010  USDA's MyPlate  ASA Prophylaxis  Lung CA Screening    TARSHA LUU MD  [facility]  "

## 2017-10-12 NOTE — NURSING NOTE
"Chief Complaint   Patient presents with     Physical       Initial /80  Pulse 68  Temp 98  F (36.7  C)  Resp 16  Ht 5' 9\" (1.753 m)  Wt 175 lb (79.4 kg)  SpO2 95%  BMI 25.84 kg/m2 Estimated body mass index is 25.84 kg/(m^2) as calculated from the following:    Height as of this encounter: 5' 9\" (1.753 m).    Weight as of this encounter: 175 lb (79.4 kg).  Medication Reconciliation: shantelle Tabares CMA      "

## 2017-10-12 NOTE — MR AVS SNAPSHOT
After Visit Summary   10/12/2017    Sergio Estevez    MRN: [id number]           Patient Information     Date Of Birth          1955        Visit Information        Provider Department      10/12/2017 8:30 AM Stu Delacruz MD [facility]        Today's Diagnoses     Routine history and physical examination of adult    -  1    Chronic right-sided low back pain without sciatica        Impotence of organic origin        Hyperlipidemia LDL goal <100        Mixed hyperlipidemia        Mixed hearing loss, unilateral        History of prostate cancer        History of gout        Anxiety        Transient insomnia        BPV (benign positional vertigo), left        Vitamin D insufficiency        Vitamin D deficiency        H/O carcinoma in situ of prostate          Care Instructions      Preventive Health Recommendations  Male Ages 50 - 64    Yearly exam:             See your health care provider every year in order to  o   Review health changes.   o   Discuss preventive care.    o   Review your medicines if your doctor has prescribed any.     Have a cholesterol test every 5 years, or more frequently if you are at risk for high cholesterol/heart disease.     Have a diabetes test (fasting glucose) every three years. If you are at risk for diabetes, you should have this test more often.     Have a colonoscopy at age 50, or have a yearly FIT test (stool test). These exams will check for colon cancer.      Talk with your health care provider about whether or not a prostate cancer screening test (PSA) is right for you.    You should be tested each year for STDs (sexually transmitted diseases), if you re at risk.     Shots: Get a flu shot each year. Get a tetanus shot every 10 years.     Nutrition:    Eat at least 5 servings of fruits and vegetables daily.     Eat whole-grain bread, whole-wheat pasta and brown rice instead of white grains and rice.     Talk to your  provider about Calcium and Vitamin D.     Lifestyle    Exercise for at least 150 minutes a week (30 minutes a day, 5 days a week). This will help you control your weight and prevent disease.     Limit alcohol to one drink per day.     No smoking.     Wear sunscreen to prevent skin cancer.     See your dentist every six months for an exam and cleaning.     See your eye doctor every 1 to 2 years.    (Z00.00) Routine history and physical examination of adult  (primary encounter diagnosis)    Comment:      Plan:          (M54.5,  G89.29) Chronic right-sided low back pain without sciatica    Comment: FACETTE JOINT AND  MILD DISC BULGING    Plan: baclofen (LIORESAL) 10 MG tablet    1/4 TO TABLET AT HOUR OF SLEEP  MAXIMAL FOUR TIMES DAILY     , lidocaine       (LMX4) 4 % CREA cream,  UP TO 4 X DAILY    ORTHO        REFERRAL, Basic metabolic panel                   (N52.9) Impotence of organic origin    Comment:      Plan:          (E78.5) Hyperlipidemia LDL goal <100    Comment:      Plan: Lipid panel reflex to direct LDL                    (E78.2) Mixed hyperlipidemia    Comment:          Plan: Lipid panel reflex to direct LDL                    (H90.8) Mixed hearing loss, unilateral    Comment:      Plan:          (Z85.46) History of prostate cancer    Comment:      Plan:          (Z87.39) History of gout    Comment:      Plan:          (F41.9) Anxiety    Comment:      Plan:          (F51.02) Transient insomnia    Comment:       Plan:          (H81.12) BPV (benign positional vertigo), left    Comment:      Plan:          (E55.9) Vitamin D insufficiency    Comment:      Plan: Vitamin D Deficiency                   (E55.9) Vitamin D deficiency    Comment:      Plan: Vitamin D Deficiency                   (Z86.008) H/O carcinoma in situ of prostate    Comment:       Plan: Prostate spec antigen screen                               Follow-ups after your visit        Additional Services     ORTHO  REFERRAL        [facility] is referring you to the Orthopedic  Services at [facility].       The  Representative will assist you in the coordination of your Orthopedic and Musculoskeletal Care as prescribed by your physician.    The  Representative will call you within 1 business day to help schedule your appointment, or you may contact the  Representative at:    All areas ~ [phone]     Type of Referral : Pain Interventionist  Spine: Lumbar  **Choose Medical Spine Specialist (unless patient was seen by a Medical Spine Specialist within the past 6 months).**  Surgical Evaluation is advised if the patient presents with one or more of the following red flags: Evidence of Spinal Tumor, Infection or Fracture, Cauda Equina Syndrome, Sudden or Progressive Weakness, Loss of Bowel or Bladder Control, or any other documented emergent neurological condition resulting from a Lumbar Spinal Condition. Medical Spine Specialist        Timeframe requested: Routine    Coverage of these services is subject to the terms and limitations of your health insurance plan.  Please call member services at your health plan with any benefit or coverage questions.      If X-rays, CT or MRI's have been performed, please contact the facility where they were done to arrange for , prior to your scheduled appointment.  Please bring this referral request to your appointment and present it to your specialist.                  Follow-up notes from your care team     Return in about 4 weeks (around 11/9/2017).      Who to contact     If you have questions or need follow up information about today's clinic visit or your schedule please contact [facility] directly at [phone].  Normal or non-critical lab and imaging results will be communicated to you by MyChart, letter or phone within 4 business days after the clinic has received the results.  "If you do not hear from us within 7 days, please contact the clinic through [employer] or phone. If you have a critical or abnormal lab result, we will notify you by phone as soon as possible.  Submit refill requests through [employer] or call your pharmacy and they will forward the refill request to us. Please allow 3 business days for your refill to be completed.          Additional Information About Your Visit        [employer] Information     [employer] gives you secure access to your electronic health record. If you see a primary care provider, you can also send messages to your care team and make appointments. If you have questions, please call your primary care clinic.  If you do not have a primary care provider, please call [phone] and they will assist you.        Care EveryWhere ID     This is your Care EveryWhere ID. This could be used by other organizations to access your [place] medical records  [id number]        Your Vitals Were     Pulse Temperature Respirations Height Pulse Oximetry BMI (Body Mass Index)    68 98  F (36.7  C) 16 5' 9\" (1.753 m) 95% 25.84 kg/m2       Blood Pressure from Last 3 Encounters:   10/12/17 114/80   06/01/17 132/78   04/25/17 122/64    Weight from Last 3 Encounters:   10/12/17 175 lb (79.4 kg)   06/01/17 175 lb 8 oz (79.6 kg)   04/25/17 175 lb (79.4 kg)              We Performed the Following     Basic metabolic panel     Lipid panel reflex to direct LDL     ORTHO  REFERRAL     Prostate spec antigen screen     Vitamin D Deficiency          Today's Medication Changes          These changes are accurate as of: 10/12/17  9:18 AM.  If you have any questions, ask your nurse or doctor.               Start taking these medicines.        Dose/Directions    baclofen 10 MG tablet   Commonly known as:  LIORESAL   Used for:  Chronic right-sided low back pain without sciatica   Started by:  Stu Delacruz MD        Dose:  10 mg   Take 1 tablet (10 mg) by mouth 3 times daily "   Quantity:  90 tablet   Refills:  3       lidocaine 4 % Crea cream   Commonly known as:  LMX4   Used for:  Chronic right-sided low back pain without sciatica   Started by:  Stu Delacruz MD        Up to 4 X DAILY   Quantity:  1 Tube   Refills:  0            Where to get your medicines      These medications were sent to [facility]/pharmacy #[id number] - [place], [place] - [address]  [address], [place] [address]     Phone:  [phone]     baclofen 10 MG tablet    lidocaine 4 % Crea cream                Primary Care Provider Office Phone # Fax #    Stu Delacruz [phone] [address]  [facility] [address]        Equal Access to Services     JADA CORTEZ : Hadii aad ku hadasho Soomaali, waaxda luqadaha, qaybta kaalmada adeegyada, waxay clint babcock. So [facility] [phone].    ATENCIÓN: Si habla español, tiene a echeverria disposición servicios gratuitos de asistencia lingüística. [facility] [phone].    We comply with applicable federal civil rights laws and [place] laws. We do not discriminate on the basis of race, color, national origin, age, disability, sex, sexual orientation, or gender identity.            Thank you!     Thank you for choosing [facility]  for your care. Our goal is always to provide you with excellent care. Hearing back from our patients is one way we can continue to improve our services. Please take a few minutes to complete the written survey that you may receive in the mail after your visit with us. Thank you!             Your Updated Medication List - Protect others around you: Learn how to safely use, store and throw away your medicines at www.disposemymeds.org.          This list is accurate as of: 10/12/17  9:18 AM.  Always use your most recent med list.                   Brand Name Dispense Instructions for use Diagnosis    albuterol 108 (90 BASE) MCG/ACT Inhaler    PROAIR  HFA    1 Inhaler    Inhale 2 puffs into the lungs every 4 hours as needed for shortness of breath / dyspnea    Hyperlipidemia LDL goal <100       allopurinol 100 MG tablet    ZYLOPRIM    90 tablet    TAKE 1 TABLET (100 MG) BY MOUTH DAILY    Idiopathic chronic gout without tophus, unspecified site       baclofen 10 MG tablet    LIORESAL    90 tablet    Take 1 tablet (10 mg) by mouth 3 times daily    Chronic right-sided low back pain without sciatica       colchicine 0.6 MG tablet    COLCRYS    90 tablet    Take 1 tablet (0.6 mg) by mouth daily    Gouty arthritis of toe of right foot       IBU PO      Take  by mouth.        indomethacin 25 MG capsule    INDOCIN    42 capsule    Take 1 capsule (25 mg) by mouth 2 times daily (with meals)    Gouty arthritis of toe of right foot       lidocaine 4 % Crea cream    LMX4    1 Tube    Up to 4 X DAILY    Chronic right-sided low back pain without sciatica       LORazepam 1 MG tablet    ATIVAN    30 tablet    TAKE 1/2 TO 1 TAB BY MOUTH EVERY 8 HOURS AS NEEDED FOR ANXIETY    Anxiety       MULTI FOR HIM 50+ Tabs     30 tablet    Take 1 tablet by mouth daily    Hyperlipidemia LDL goal <100       Plant Sterols and Stanols 450 MG Tabs     12 tablet    Take 2 tablets by mouth 2 times daily    Hyperlipidemia LDL goal <100       predniSONE 20 MG tablet    DELTASONE    14 tablet    Take 1 tablet (20 mg) by mouth 2 times daily    DDD (degenerative disc disease), lumbar       Red Yeast Rice Extract 600 MG Caps    CVS RED YEAST RICE    120 capsule    Take 2 capsules by mouth 2 times daily (with meals)    Hyperlipidemia LDL goal <100       vardenafil 20 MG tablet    LEVITRA    6 tablet    Take 0.5-1 tablets (10-20 mg) by mouth daily as needed for erectile dysfunction Never use with nitroglycerin, terazosin or doxazosin.    Prostate cancer (H)       Vitamin D (Cholecalciferol) 1000 UNITS Caps     30 capsule    Take 1 tablet by mouth daily    Vitamin D insufficiency       zolpidem 10 MG tablet     AMBIEN    30 tablet    TAKE 1 TABLET BY MOUTH AT BEDTIME AS NEEDED FOR SLEEP    Transient insomnia, Anxiety

## 2017-10-12 NOTE — LETTER
"October 13, 2017      Sergio Estevez  [address]  [facility] [address]        Dear ShilpiZenaida,    We are writing to inform you of your test results.    NORMAL VITAMIN D REPLACEMENT  LEVEL   NOT NECESSARY TO CHECK LEVEL AS LONG AS YOU TAKE CURRENT VITAMIN D REPLACEMENT     NORMAL GLUCOSE, RENAL AND BLOOD SALTS     NORMAL TOTAL CHOLESTEROL   NORMAL TRIGLYCERIDES   NORMAL HDL OR \"GOOD\" CHOLESTEROL   BORDERLINE  LDL OR \"BAD\" CHOLESTEROL   BORDERLINE  VERY LOW DENSITY CHOLESTEROL   NORMAL PSA OR PROSTATE CANCER SCREENING TEST    Resulted Orders   Basic metabolic panel   Result Value Ref Range    Sodium 140 133 - 144 mmol/L    Potassium 4.4 3.4 - 5.3 mmol/L    Chloride 106 94 - 109 mmol/L    Carbon Dioxide 29 20 - 32 mmol/L    Anion Gap 5 3 - 14 mmol/L    Glucose 87 70 - 99 mg/dL      Comment:      Fasting specimen    Urea Nitrogen 14 7 - 30 mg/dL    Creatinine 1.08 0.66 - 1.25 mg/dL    GFR Estimate 69 >60 mL/min/1.7m2      Comment:      Non  GFR Calc    GFR Estimate If Black 84 >60 mL/min/1.7m2      Comment:       GFR Calc    Calcium 8.9 8.5 - 10.1 mg/dL   Lipid panel reflex to direct LDL   Result Value Ref Range    Cholesterol 192 <200 mg/dL    Triglycerides 119 <150 mg/dL      Comment:      Fasting specimen    HDL Cholesterol 48 >39 mg/dL    LDL Cholesterol Calculated 120 (H) <100 mg/dL      Comment:      Above desirable:  100-129 mg/dl  Borderline High:  130-159 mg/dL  High:             160-189 mg/dL  Very high:       >189 mg/dl      Non HDL Cholesterol 144 (H) <130 mg/dL      Comment:      Above Desirable:  130-159 mg/dl  Borderline high:  160-189 mg/dl  High:             190-219 mg/dl  Very high:       >219 mg/dl     Vitamin D Deficiency   Result Value Ref Range    Vitamin D Deficiency screening 50 20 - 75 ug/L      Comment:      Season, race, dietary intake, and treatment affect the concentration of   25-hydroxy-Vitamin D. Values may decrease during winter months " and increase   during summer months. Values 20-29 ug/L may indicate Vitamin D insufficiency   and values <20 ug/L may indicate Vitamin D deficiency.  Vitamin D determination is routinely performed by an immunoassay specific for   25 hydroxyvitamin D3.  If an individual is on vitamin D2 (ergocalciferol)   supplementation, please specify 25 OH vitamin D2 and D3 level determination by   LCMSMS test [id number].     Prostate spec antigen screen   Result Value Ref Range    PSA 0.02 0 - 4 ug/L      Comment:      Assay Method:  Chemiluminescence using Siemens Vista analyzer       If you have any questions or concerns, please call the clinic at the number listed above.       Sincerely,        TARSHA LUU MD

## 2017-10-13 LAB
ANION GAP SERPL CALCULATED.3IONS-SCNC: 5 MMOL/L (ref 3–14)
BUN SERPL-MCNC: 14 MG/DL (ref 7–30)
CALCIUM SERPL-MCNC: 8.9 MG/DL (ref 8.5–10.1)
CHLORIDE SERPL-SCNC: 106 MMOL/L (ref 94–109)
CHOLEST SERPL-MCNC: 192 MG/DL
CO2 SERPL-SCNC: 29 MMOL/L (ref 20–32)
CREAT SERPL-MCNC: 1.08 MG/DL (ref 0.66–1.25)
DEPRECATED CALCIDIOL+CALCIFEROL SERPL-MC: 50 UG/L (ref 20–75)
GFR SERPL CREATININE-BSD FRML MDRD: 69 ML/MIN/1.7M2
GLUCOSE SERPL-MCNC: 87 MG/DL (ref 70–99)
HDLC SERPL-MCNC: 48 MG/DL
LDLC SERPL CALC-MCNC: 120 MG/DL
NONHDLC SERPL-MCNC: 144 MG/DL
POTASSIUM SERPL-SCNC: 4.4 MMOL/L (ref 3.4–5.3)
PSA SERPL-ACNC: 0.02 UG/L (ref 0–4)
SODIUM SERPL-SCNC: 140 MMOL/L (ref 133–144)
TRIGL SERPL-MCNC: 119 MG/DL

## 2017-10-13 NOTE — PROGRESS NOTES
"Please send normal lab letter when labs are complete    NORMAL VITAMIN D REPLACEMENT  LEVEL   NOT NECESSARY TO CHECK LEVEL AS LONG AS YOU TAKE CURRENT VITAMIN D REPLACEMENT    NORMAL GLUCOSE, RENAL AND BLOOD SALTS    NORMAL TOTAL CHOLESTEROL   NORMAL TRIGLYCERIDES   NORMAL HDL OR \"GOOD\" CHOLESTEROL   BORDERLINE  LDL OR \"BAD\" CHOLESTEROL   BORDERLINE  VERY LOW DENSITY CHOLESTEROL   NORMAL PSA OR PROSTATE CANCER SCREENING TEST,     TARSHA LUU JR., MD"

## 2017-11-06 DIAGNOSIS — F41.9 ANXIETY: ICD-10-CM

## 2017-11-06 DIAGNOSIS — F51.02 TRANSIENT INSOMNIA: ICD-10-CM

## 2017-11-07 RX ORDER — ZOLPIDEM TARTRATE 10 MG/1
TABLET ORAL
Qty: 30 TABLET | Refills: 1 | Status: SHIPPED | OUTPATIENT
Start: 2017-11-07 | End: 2018-01-30

## 2017-11-07 NOTE — TELEPHONE ENCOUNTER
ambien      Last Written Prescription Date:  8/31/17  Last Fill Quantity: 30,   # refills: 1  Future Office visit:       Routing refill request to provider for review/approval because:  Drug not on the [facility], P or [facility] refill protocol or controlled substance

## 2017-11-20 ENCOUNTER — OFFICE VISIT (OUTPATIENT)
Dept: PALLIATIVE MEDICINE | Facility: CLINIC | Age: 62
End: 2017-11-20
Payer: COMMERCIAL

## 2017-11-20 VITALS — OXYGEN SATURATION: 95 % | SYSTOLIC BLOOD PRESSURE: 131 MMHG | HEART RATE: 72 BPM | DIASTOLIC BLOOD PRESSURE: 86 MMHG

## 2017-11-20 DIAGNOSIS — M47.817 LUMBOSACRAL SPONDYLOSIS WITHOUT MYELOPATHY: Primary | ICD-10-CM

## 2017-11-20 PROCEDURE — 99215 OFFICE O/P EST HI 40 MIN: CPT | Performed by: PAIN MEDICINE

## 2017-11-20 ASSESSMENT — PAIN SCALES - GENERAL: PAINLEVEL: MILD PAIN (3)

## 2017-11-20 NOTE — PROGRESS NOTES
[facility] Consultation    Date of visit: 11/20/2017    Reason for consultation:    Primary Care Provider is Stu Delacruz.  Pain medications are being prescribed by n/a.    Please see the [facility] health questionnaire which the patient completed and reviewed with me in detail.    Chief Complaint:    Chief Complaint   Patient presents with     Pain     interventional evaluation       Pain history:  Sergio Estevez is a 62 year old male who first started having problems with pain approx 13 months ago. The pt denies any inciting event, but did moves apartment/houses 3 times right before pain started. The pain is a constant dull aching, although occasionally is sharp shooting. The pain radiates across and up his back. The pain is worse when sitting for extended periods of time . The pain is worse when going from a seated to standing position. + Pain when walking for extended periods of time. The pain is better with rest/lying flat. He reports NSAIDS help mild-mod. He reports no benefit w/ baclofen. He cont to do PHYSICAL THERAPY /home exercises w/ some relief.   He denies any progressive weakness, He denies any incontinence. He denies numbness/burning  Pain rating: intensity  Averages 5/10 on a 0-10 scale. 7 worse     Any bowel or bladder incontinence: no    Current treatments include:  Nsaids, baclofen     Previous medication treatments included:  n/a    Other treatments have included:  PT: yes mild benefit   Chiropractic: no  Acupuncture: no  TENs Unit: no  Injections: no    Past Medical History:  Past Medical History:   Diagnosis Date     Bronchitis      DDD (degenerative disc disease), lumbar 6/1/2017     Malignant neoplasm (H) prostate     Pneumonia      Past Surgical History:  Past Surgical History:   Procedure Laterality Date     OTHER SURGICAL HISTORY  1976    4 toes amputated from industrial accident     Medications:  Current Outpatient Prescriptions    Medication Sig Dispense Refill     zolpidem (AMBIEN) 10 MG tablet TAKE 1 TABLET BY MOUTH AT BEDTIME AS NEEDED FOR SLEEP 30 tablet 1     baclofen (LIORESAL) 10 MG tablet Take 1 tablet (10 mg) by mouth 3 times daily 90 tablet 3     lidocaine (LMX4) 4 % CREA cream Up to 4 X DAILY 1 Tube 0     Multiple Vitamin (MULTI-VITAMINS) TABS        allopurinol (ZYLOPRIM) 100 MG tablet Take 100 mg by mouth       LORazepam (ATIVAN) 1 MG tablet TAKE 1/2 TO 1 TAB BY MOUTH EVERY 8 HOURS AS NEEDED FOR ANXIETY 30 tablet 1     allopurinol (ZYLOPRIM) 100 MG tablet TAKE 1 TABLET (100 MG) BY MOUTH DAILY 90 tablet 0     Vitamin D, Cholecalciferol, 1000 UNITS CAPS Take 1 tablet by mouth daily 30 capsule 11     indomethacin (INDOCIN) 25 MG capsule Take 1 capsule (25 mg) by mouth 2 times daily (with meals) 42 capsule 1     colchicine (COLCRYS) 0.6 MG tablet Take 1 tablet (0.6 mg) by mouth daily 90 tablet 3     albuterol (ALBUTEROL) 108 (90 BASE) MCG/ACT inhaler Inhale 2 puffs into the lungs every 4 hours as needed for shortness of breath / dyspnea 1 Inhaler 0     Red Yeast Rice Extract (CVS RED YEAST RICE) 600 MG CAPS Take 2 capsules by mouth 2 times daily (with meals) 120 capsule 11     Plant Sterols and Stanols 450 MG TABS Take 2 tablets by mouth 2 times daily 12 tablet PRN     Multiple Vitamins-Minerals (MULTI FOR HIM 50+) TABS Take 1 tablet by mouth daily 30 tablet 11     vardenafil (LEVITRA) 20 MG tablet Take 0.5-1 tablets (10-20 mg) by mouth daily as needed for erectile dysfunction Never use with nitroglycerin, terazosin or doxazosin. 6 tablet 11     Ibuprofen (IBU PO) Take  by mouth.       predniSONE (DELTASONE) 20 MG tablet Take 1 tablet (20 mg) by mouth 2 times daily (Patient not taking: Reported on 11/20/2017) 14 tablet 0     Allergies:     Allergies   Allergen Reactions     Keflex [Cephalexin Hcl]      Social History:  Home situation: partner   Occupation/Schooling: retired   Tobacco use: never  Alcohol use: occasional  Drug use:  never   History of chemical dependency treatment: never    Family history:  Family History   Problem Relation Age of Onset     CANCER Mother      Breast Cancer Mother      Alzheimer Disease Father      Family history of headaches: no    Review of Systems:  Skin: negative  Eyes: negative  Ears/Nose/Throat: negative  Respiratory: No shortness of breath, dyspnea on exertion, cough, or hemoptysis  Cardiovascular: negative  Gastrointestinal: negative  Genitourinary: negative  Musculoskeletal: negative  Neurologic: negative  Psychiatric: negative  Hematologic/Lymphatic/Immunologic: negative  Endocrine: negative    Physical Exam:  Vitals:    11/20/17 1305   BP: 131/86   Pulse: 72   SpO2: 95%     Exam:  Constitutional: healthy, alert and no distress  Head: normocephalic. Atraumatic.   Eyes: no redness or jaundice noted   ENT: oropharnx normal.  MMM.  Neck supple.    Cardiovascular: neg jvd  Respiratory: clear   Gastrointestinal: soft, non-tender, normoactive bowel sounds   Skin: no suspicious lesions or rashes  Psychiatric: mentation appears normal and affect normal/bright    Musculoskeletal exam:  Gait/Station/Posture: wnl  Cervical spine: ROM       Thoracic spine:  Normal     Lumbar spine:     ROM: limited extension    Myofascial tenderness:  + TTP L lower back    Mac's: + bilat reproduces pain                Straight leg exam: neg   DENICE/FADIR: neg              SI: neg   Greater trochanteric bursa: neg  Neurologic exam:  CN:  Cranial nerves 2-12 are normal  Motor:  5/5 UE and LE strength  Reflexes:       Patella:  +2   Achilles:  +2    Sensory:  (upper and lower extremities):   Light touch: normal    Allodynia: absent    Dysethesia: absent    Hyperalgesia: absent     Diagnostic tests:  MRI reviewed  Lumbar spondylosis     Assessment/Plan:  eSrgio Estevez is a 62 year old male who presents with the complaints of axial low back pain.   Sergio was seen today for pain.    Diagnoses and all orders for this  visit:    Lumbosacral spondylosis without myelopathy  -     PAIN INJECTION EVAL/TREAT/FOLLOW UP      - Further procedures recommended:    -bilateral L4-S1 Lumbar MEDIAL BRANCH BLOCK   - Medication Management:    - consider switching to Mobic 15mg daily as needed given duration of action, would have to stop as needed motrin will defer to PCP   - Physical Therapy: continue home exercises   - Diagnostic Studies: none   - Follow up: With Primary care provider after procedure.           Total time spent was 60 minutes, and more than 50% of face to face time was spent counseling and/or coordination of care regarding principles of multidisciplinary care and medication management axial LBP    Oziel Skinner MD  [facility]

## 2017-11-20 NOTE — MR AVS SNAPSHOT
After Visit Summary   11/20/2017    Sergio Estevez    MRN: [id number]           Patient Information     Date Of Birth          1955        Visit Information        Provider Department      11/20/2017 1:00 PM Oziel Skinner MD [facility]        Today's Diagnoses     Lumbosacral spondylosis without myelopathy    -  1      Care Instructions    - Further procedures recommended:    -bilateral Lumbar MEDIAL BRANCH BLOCK    - Medication Management:    - consider switching to Mobic 15mg daily as needed given duration of action, would have to stop as needed motrin will defer to PCP   - Physical Therapy: continue home exercises   - Diagnostic Studies: none   - Follow up: With Primary care provider after procedure.           ----------------------------------------------------------------  Nurse Triage line:  [phone]   Call this number with any questions or concerns. You may leave a detailed message anytime. Calls are typically returned Monday through Friday between 8 AM and 4:30 PM. We usually get back to you within 2 business days depending on the issue/request.           Scheduling number: [phone].  Call this number to schedule or change appointments.    We believe regular attendance is key to your success in our program.    Any time you are unable to keep your appointment we ask that you call us at least 24 hours in advance to let us know. This will allow us to offer the appointment time to another patient.                 Follow-ups after your visit        Additional Services     PAIN INJECTION EVAL/TREAT/FOLLOW UP                 Who to contact     If you have questions or need follow up information about today's clinic visit or your schedule please contact [facility] directly at [phone].  Normal or non-critical lab and imaging results will be communicated to you by MyChart, letter or phone within 4 business days after the clinic has received  the results. If you do not hear from us within 7 days, please contact the clinic through [employer] or phone. If you have a critical or abnormal lab result, we will notify you by phone as soon as possible.  Submit refill requests through [employer] or call your pharmacy and they will forward the refill request to us. Please allow 3 business days for your refill to be completed.          Additional Information About Your Visit        [email or web address] Information     [employer] gives you secure access to your electronic health record. If you see a primary care provider, you can also send messages to your care team and make appointments. If you have questions, please call your primary care clinic.  If you do not have a primary care provider, please call [phone] and they will assist you.        Care EveryWhere ID     This is your Care EveryWhere ID. This could be used by other organizations to access your [facility] [place] medical records  [id number]        Your Vitals Were     Pulse Pulse Oximetry                72 95%           Blood Pressure from Last 3 Encounters:   11/20/17 131/86   10/12/17 114/80   06/01/17 132/78    Weight from Last 3 Encounters:   10/12/17 79.4 kg (175 lb)   06/01/17 79.6 kg (175 lb 8 oz)   04/25/17 79.4 kg (175 lb)              We Performed the Following     PAIN INJECTION EVAL/TREAT/FOLLOW UP        Primary Care Provider Office Phone # Fax #    Stu Kimberlyn Delacruz [phone] [address] [facility] [address]        Equal Access to Services     JADA CORTEZ : Hadii aad ku hadasho Soomaali, waaxda luqadaha, qaybta kaalmada adeegyada, damian montero . So [facility] [phone].    ATENCIÓN: Si habla español, tiene a echeverria disposición servicios gratuitos de asistencia lingüística. Llame al [phone].    We comply with applicable federal civil rights laws and [place] laws. We do not discriminate on the basis of race, color, national origin, age, disability,  sex, sexual orientation, or gender identity.            Thank you!     Thank you for choosing [facility]  for your care. Our goal is always to provide you with excellent care. Hearing back from our patients is one way we can continue to improve our services. Please take a few minutes to complete the written survey that you may receive in the mail after your visit with us. Thank you!             Your Updated Medication List - Protect others around you: Learn how to safely use, store and throw away your medicines at www.disposemymeds.org.          This list is accurate as of: 11/20/17  1:54 PM.  Always use your most recent med list.                   Brand Name Dispense Instructions for use Diagnosis    albuterol 108 (90 BASE) MCG/ACT Inhaler    PROAIR HFA    1 Inhaler    Inhale 2 puffs into the lungs every 4 hours as needed for shortness of breath / dyspnea    Hyperlipidemia LDL goal <100       * allopurinol 100 MG tablet    ZYLOPRIM     Take 100 mg by mouth        * allopurinol 100 MG tablet    ZYLOPRIM    90 tablet    TAKE 1 TABLET (100 MG) BY MOUTH DAILY    Idiopathic chronic gout without tophus, unspecified site       baclofen 10 MG tablet    LIORESAL    90 tablet    Take 1 tablet (10 mg) by mouth 3 times daily    Chronic right-sided low back pain without sciatica       colchicine 0.6 MG tablet    COLCRYS    90 tablet    Take 1 tablet (0.6 mg) by mouth daily    Gouty arthritis of toe of right foot       IBU PO      Take  by mouth.        indomethacin 25 MG capsule    INDOCIN    42 capsule    Take 1 capsule (25 mg) by mouth 2 times daily (with meals)    Gouty arthritis of toe of right foot       lidocaine 4 % Crea cream    LMX4    1 Tube    Up to 4 X DAILY    Chronic right-sided low back pain without sciatica       LORazepam 1 MG tablet    ATIVAN    30 tablet    TAKE 1/2 TO 1 TAB BY MOUTH EVERY 8 HOURS AS NEEDED FOR ANXIETY    Anxiety       MULTI FOR HIM 50+ Tabs     30 tablet    Take 1 tablet by  mouth daily    Hyperlipidemia LDL goal <100       MULTI-VITAMINS Tabs           Plant Sterols and Stanols 450 MG Tabs     12 tablet    Take 2 tablets by mouth 2 times daily    Hyperlipidemia LDL goal <100       predniSONE 20 MG tablet    DELTASONE    14 tablet    Take 1 tablet (20 mg) by mouth 2 times daily    DDD (degenerative disc disease), lumbar       Red Yeast Rice Extract 600 MG Caps    [employer] RED YEAST RICE    120 capsule    Take 2 capsules by mouth 2 times daily (with meals)    Hyperlipidemia LDL goal <100       vardenafil 20 MG tablet    LEVITRA    6 tablet    Take 0.5-1 tablets (10-20 mg) by mouth daily as needed for erectile dysfunction Never use with nitroglycerin, terazosin or doxazosin.    Prostate cancer (H)       Vitamin D (Cholecalciferol) 1000 UNITS Caps     30 capsule    Take 1 tablet by mouth daily    Vitamin D insufficiency       zolpidem 10 MG tablet    AMBIEN    30 tablet    TAKE 1 TABLET BY MOUTH AT BEDTIME AS NEEDED FOR SLEEP    Transient insomnia, Anxiety       * Notice:  This list has 2 medication(s) that are the same as other medications prescribed for you. Read the directions carefully, and ask your doctor or other care provider to review them with you.

## 2017-11-20 NOTE — PATIENT INSTRUCTIONS
- Further procedures recommended:    -bilateral Lumbar MEDIAL BRANCH BLOCK    - Medication Management:    - consider switching to Mobic 15mg daily as needed given duration of action, would have to stop as needed motrin will defer to PCP   - Physical Therapy: continue home exercises   - Diagnostic Studies: none   - Follow up: With Primary care provider after procedure.           ----------------------------------------------------------------  Nurse Triage line:  [phone]   Call this number with any questions or concerns. You may leave a detailed message anytime. Calls are typically returned Monday through Friday between 8 AM and 4:30 PM. We usually get back to you within 2 business days depending on the issue/request.           Scheduling number: [phone].  Call this number to schedule or change appointments.    We believe regular attendance is key to your success in our program.    Any time you are unable to keep your appointment we ask that you call us at least 24 hours in advance to let us know. This will allow us to offer the appointment time to another patient.

## 2017-11-20 NOTE — NURSING NOTE
"Chief Complaint   Patient presents with     Pain     interventional evaluation       Initial /86  Pulse 72  SpO2 95% Estimated body mass index is 25.84 kg/(m^2) as calculated from the following:    Height as of 10/12/17: 1.753 m (5' 9\").    Weight as of 10/12/17: 79.4 kg (175 lb).    "

## 2017-11-30 DIAGNOSIS — M1A.00X0 IDIOPATHIC CHRONIC GOUT WITHOUT TOPHUS, UNSPECIFIED SITE: ICD-10-CM

## 2017-11-30 DIAGNOSIS — F41.9 ANXIETY: ICD-10-CM

## 2017-11-30 RX ORDER — LORAZEPAM 1 MG/1
TABLET ORAL
Qty: 30 TABLET | Refills: 1 | Status: SHIPPED | OUTPATIENT
Start: 2017-11-30 | End: 2017-12-27

## 2017-11-30 NOTE — TELEPHONE ENCOUNTER
LORazepam (ATIVAN) 1 MG tablet      Last Written Prescription Date:  9/29/17  Last Fill Quantity: 30,   # refills: 1  Last Office Visit: 10/12/17  Future Office visit:       Routing refill request to provider for review/approval because:  Drug not on the [facility], [facility] or [facility] refill protocol or controlled substance

## 2017-11-30 NOTE — TELEPHONE ENCOUNTER
Controlled Substance Refill Request for LORazepam (ATIVAN) 1 MG tablet  Problem List Complete:  No     PROVIDER TO CONSIDER COMPLETION OF PROBLEM LIST AND OVERVIEW/CONTROLLED SUBSTANCE AGREEMENT        Controlled substance agreement on file: No.     Processing:  Fax Rx to [facility] pharmacy     checked in past 6 months?  Yes 11/30/2017- no concerns

## 2017-12-04 RX ORDER — ALLOPURINOL 100 MG/1
TABLET ORAL
Qty: 30 TABLET | Refills: 0 | Status: SHIPPED | OUTPATIENT
Start: 2017-12-04 | End: 2018-01-25

## 2017-12-04 NOTE — TELEPHONE ENCOUNTER
Medication is being filled for 1 time refill only due to:  Patient needs labs ALT, CBC, Uric Acid. Labs ordered.

## 2017-12-14 ENCOUNTER — TELEPHONE (OUTPATIENT)
Dept: PALLIATIVE MEDICINE | Facility: CLINIC | Age: 62
End: 2017-12-14

## 2017-12-15 NOTE — TELEPHONE ENCOUNTER
Talked with pt and let him know that the MRI results are in the chart and Dr Delacruz would be able to review it with him during his up coming appt  
Normal rate, regular rhythm, normal S1, S2 heart sounds heard.

## 2017-12-19 NOTE — TELEPHONE ENCOUNTER
Pt to hold off on PT for now.    An appeal had been made for his medial branch block.  If approved then PT will not be needed.    Shayy Gerber RN-BSN  [facility]

## 2017-12-27 DIAGNOSIS — F41.9 ANXIETY: ICD-10-CM

## 2018-01-02 NOTE — TELEPHONE ENCOUNTER
LORazepam (ATIVAN) 1 MG tablet      Last Written Prescription Date:  11-30-17  Last Fill Quantity: 30,   # refills: 1  Last Office Visit: 10-12-17  Future Office visit:       Routing refill request to provider for review/approval because:  Drug not on the [facility], P or [facility] refill protocol or controlled substance

## 2018-01-04 RX ORDER — LORAZEPAM 1 MG/1
TABLET ORAL
Qty: 30 TABLET | Refills: 1 | Status: SHIPPED | OUTPATIENT
Start: 2018-01-04 | End: 2018-02-06

## 2018-01-30 DIAGNOSIS — F41.9 ANXIETY: ICD-10-CM

## 2018-01-30 DIAGNOSIS — F51.02 TRANSIENT INSOMNIA: ICD-10-CM

## 2018-02-01 RX ORDER — ZOLPIDEM TARTRATE 10 MG/1
TABLET ORAL
Qty: 30 TABLET | Refills: 1 | Status: SHIPPED | OUTPATIENT
Start: 2018-02-01 | End: 2018-04-24

## 2018-02-01 NOTE — TELEPHONE ENCOUNTER
ambien      Last Written Prescription Date:  11/7/17  Last Fill Quantity: 30,   # refills: 1  Last Office Visit: 10/12/17  Future Office visit:       Routing refill request to provider for review/approval because:  Drug not on the [facility], P or [facility] refill protocol or controlled substance

## 2018-02-06 DIAGNOSIS — F41.9 ANXIETY: ICD-10-CM

## 2018-02-07 NOTE — TELEPHONE ENCOUNTER
Controlled Substance Refill Request for LORazepam (ATIVAN) 1 MG tablet  Problem List Complete:  No     PROVIDER TO CONSIDER COMPLETION OF PROBLEM LIST AND OVERVIEW/CONTROLLED SUBSTANCE AGREEMENT    Last Written Prescription Date:  1/4/2018  Last Fill Quantity: 30 tablet,   # refills: 1    Last Office Visit with [facility] primary care provider: 10/12/2017    Future Office visit:     Controlled substance agreement on file: No.     Processing:  Fax Rx to [facility] [phone] pharmacy   checked in past 6 months?  Yes 08/30/2017- no concerns

## 2018-02-07 NOTE — TELEPHONE ENCOUNTER
Controlled Substance Refill Request for Ativan  Problem List Complete:  No     PROVIDER TO CONSIDER COMPLETION OF PROBLEM LIST AND OVERVIEW/CONTROLLED SUBSTANCE AGREEMENT    Last Written Prescription Date:  1-4-18  Last Fill Quantity: 30,   # refills: 1    Last Office Visit with G primary care provider: 10-12-17    Future Office visit:     Controlled substance agreement on file: No.     Processing:  Fax Rx to [facility] pharmacy   checked in past 6 months?  Yes 2-4-18 no concerns

## 2018-02-09 DIAGNOSIS — F41.9 ANXIETY: ICD-10-CM

## 2018-02-09 RX ORDER — LORAZEPAM 1 MG/1
TABLET ORAL
Qty: 30 TABLET | Refills: 1 | Status: SHIPPED | OUTPATIENT
Start: 2018-02-09 | End: 2018-04-16

## 2018-02-12 RX ORDER — LORAZEPAM 1 MG/1
TABLET ORAL
Qty: 30 TABLET | Refills: 1 | OUTPATIENT
Start: 2018-02-12

## 2018-03-04 DIAGNOSIS — M1A.00X0 IDIOPATHIC CHRONIC GOUT WITHOUT TOPHUS, UNSPECIFIED SITE: ICD-10-CM

## 2018-03-05 NOTE — TELEPHONE ENCOUNTER
"Requested Prescriptions   Pending Prescriptions Disp Refills     allopurinol (ZYLOPRIM) 100 MG tablet [Pharmacy Med Name: ALLOPURINOL 100 MG TABLET]  Last Written Prescription Date:  1-26-18  Last Fill Quantity: 30 tab,  # refills: 0   Last Office Visit  10/12/2017        with  [facility], [facility] or [facility] prescribing provider:     Future Office Visit:        30 tablet 0     Sig: TAKE 1 TABLET (100 MG) BY MOUTH DAILY. *APPT DUE FOR REFILLS*    Gout Agents Protocol Failed    3/4/2018  1:33 AM       Failed - CBC on file in past 12 months    Recent Labs   Lab Test  10/26/15   0839   WBC  5.8   RBC  5.25   HGB  15.7   HCT  44.7   PLT  150            Failed - ALT on file in past 12 months    Recent Labs   Lab Test  10/10/16   1113   ALT  20            Failed - Uric acid greater than or equal to 6 on file in past 12 months    Recent Labs   Lab Test  10/26/15   0856   URIC  8.2     If level is 6mg/dL or greater, ok to refill one time and refer to provider.          Passed - Recent (12 mo) or future (30 days) visit within the authorizing provider's specialty    Patient had office visit in the last year or has a visit in the next 30 days with authorizing provider.  See \"Patient Info\" tab in inbasket, or \"Choose Columns\" in Meds & Orders section of the refill encounter.            Passed - Patient is age 18 or older       Passed - Normal serum creatinine on file in the past 12 months    Recent Labs   Lab Test  10/12/17   0921   CR  1.08               "

## 2018-03-06 RX ORDER — ALLOPURINOL 100 MG/1
100 TABLET ORAL DAILY
Qty: 30 TABLET | Refills: 0 | Status: SHIPPED | OUTPATIENT
Start: 2018-03-06 | End: 2018-04-24

## 2018-03-06 NOTE — TELEPHONE ENCOUNTER
Detailed message left on answering machine asking patient to come in for his labs so med can be refilled.  Routing refill request to provider for review/approval because:  Labs not current:  Uric acid, cbc, alt

## 2018-04-16 DIAGNOSIS — F41.9 ANXIETY: ICD-10-CM

## 2018-04-16 NOTE — TELEPHONE ENCOUNTER
Requested Prescriptions   Pending Prescriptions Disp Refills     LORazepam (ATIVAN) 1 MG tablet [Pharmacy Med Name: LORAZEPAM 1 MG TABLET]     Last Written Prescription Date:  2/9/18  Last Fill Quantity: 30,   # refills: 1  Last Office Visit: 10/12/17 Jayme  [facility] Office visit:       Routing refill request to provider for review/approval because:  Drug not on the [facility] or [facility] refill protocol or controlled substance   30 tablet 1     Sig: TAKE 1/2 TO 1 TABLET BY MOUTH EVERY 8 HOURS AS NEEDED FOR ANXIETY    There is no refill protocol information for this order

## 2018-04-17 NOTE — TELEPHONE ENCOUNTER
Controlled Substance Refill Request for Ativan  Problem List Complete:  No     PROVIDER TO CONSIDER COMPLETION OF PROBLEM LIST AND OVERVIEW/CONTROLLED SUBSTANCE AGREEMENT      Controlled substance agreement on file: No.     Processing:  Fax Rx to [facility] pharmacy   checked in past 6 months?  Yes 2-7-18 no concerns

## 2018-04-19 ENCOUNTER — TELEPHONE (OUTPATIENT)
Dept: NURSING | Facility: CLINIC | Age: 63
End: 2018-04-19

## 2018-04-19 RX ORDER — LORAZEPAM 1 MG/1
TABLET ORAL
Qty: 30 TABLET | Refills: 1 | Status: SHIPPED | OUTPATIENT
Start: 2018-04-19 | End: 2018-04-19

## 2018-04-19 RX ORDER — LORAZEPAM 1 MG/1
1 TABLET ORAL EVERY 6 HOURS PRN
Qty: 30 TABLET | Refills: 1 | Status: SHIPPED | OUTPATIENT
Start: 2018-04-19 | End: 2018-05-30

## 2018-04-19 NOTE — TELEPHONE ENCOUNTER
Per huddle with JIMENEZ Rich, Lorazepam script was faxed to [facility] in [phone]. Left detailed voice message informing patient this was done.

## 2018-04-19 NOTE — TELEPHONE ENCOUNTER
Patient calling and requesting the prescription for his lorazepam be faxed to [facility] in [place] [phone]. He was told by the pharmacy in [place] that they could not fax it that the provider has to send the original. Transferred request to provider at  office.

## 2018-04-23 ENCOUNTER — MYC MEDICAL ADVICE (OUTPATIENT)
Dept: FAMILY MEDICINE | Facility: CLINIC | Age: 63
End: 2018-04-23

## 2018-04-23 NOTE — TELEPHONE ENCOUNTER
Patient states his prescription ws sent to the wrong pharmacy. He states he is in [place] and need his prescription sent to a pharmacy there.

## 2018-04-24 DIAGNOSIS — M1A.00X0 IDIOPATHIC CHRONIC GOUT WITHOUT TOPHUS, UNSPECIFIED SITE: ICD-10-CM

## 2018-04-24 DIAGNOSIS — F41.9 ANXIETY: ICD-10-CM

## 2018-04-24 DIAGNOSIS — F51.02 TRANSIENT INSOMNIA: ICD-10-CM

## 2018-04-26 RX ORDER — ZOLPIDEM TARTRATE 10 MG/1
TABLET ORAL
Qty: 30 TABLET | Refills: 1 | Status: SHIPPED | OUTPATIENT
Start: 2018-04-26 | End: 2018-07-04

## 2018-04-26 RX ORDER — ALLOPURINOL 100 MG/1
TABLET ORAL
Qty: 30 TABLET | Refills: 0 | Status: SHIPPED | OUTPATIENT
Start: 2018-04-26 | End: 2018-06-02

## 2018-04-26 NOTE — TELEPHONE ENCOUNTER
"Requested Prescriptions   Pending Prescriptions Disp Refills     zolpidem (AMBIEN) 10 MG tablet [Pharmacy Med Name: ZOLPIDEM TARTRATE 10 MG TABLET]      Last Written Prescription Date:  2/1/18  Last Fill Quantity: 30,   # refills: 1  Last Office Visit: 10/12/17 [facility]  Future Office visit:       Routing refill request to provider for review/approval because:  Drug not on the [facility], P or [facility] refill protocol or controlled substance     30 tablet 1     Sig: TAKE 1 TABLET BY MOUTH AT BEDTIME AS NEEDED FOR SLEEP    There is no refill protocol information for this order        allopurinol (ZYLOPRIM) 100 MG tablet [Pharmacy Med Name: ALLOPURINOL 100 MG TABLET]  Last Written Prescription Date:  1/26/18  Last Fill Quantity: 30,  # refills: 0   Last office visit: 10/12/17 [facility]  Future Office Visit:     30 tablet 0     Sig: TAKE 1 TABLET (100 MG) BY MOUTH DAILY. *APPT NEEDED FOR REFILLS*    Gout Agents Protocol Failed    4/24/2018  8:10 PM       Failed - CBC on file in past 12 months    Recent Labs   Lab Test  10/26/15   0839   WBC  5.8   RBC  5.25   HGB  15.7   HCT  44.7   PLT  150            Failed - ALT on file in past 12 months    Recent Labs   Lab Test  10/10/16   1113   ALT  20            Failed - Uric acid greater than or equal to 6 on file in past 12 months    Recent Labs   Lab Test  10/26/15   0856   URIC  8.2     If level is 6mg/dL or greater, ok to refill one time and refer to provider.          Passed - Recent (12 mo) or future (30 days) visit within the authorizing provider's specialty    Patient had office visit in the last 12 months or has a visit in the next 30 days with authorizing provider or within the authorizing provider's specialty.  See \"Patient Info\" tab in inbasket, or \"Choose Columns\" in Meds & Orders section of the refill encounter.           Passed - Patient is age 18 or older       Passed - Normal serum creatinine on file in the past 12 months    Recent Labs   Lab Test  10/12/17   0921 "   CR  1.08

## 2018-04-26 NOTE — TELEPHONE ENCOUNTER
Routing refill request to provider for review/approval because:  Drug not on the [facility] refill protocol  And failed protocol for the allopurinol

## 2018-05-30 DIAGNOSIS — F41.9 ANXIETY: ICD-10-CM

## 2018-05-31 RX ORDER — LORAZEPAM 1 MG/1
TABLET ORAL
Qty: 30 TABLET | Refills: 1 | Status: SHIPPED | OUTPATIENT
Start: 2018-05-31 | End: 2018-06-17

## 2018-05-31 NOTE — TELEPHONE ENCOUNTER
LORazepam (ATIVAN) 1 MG tablet      Last Written Prescription Date:  4-19-18  Last Fill Quantity: 30tab,   # refills: 1  Last Office Visit: 10-12-17  Future Office visit:       Routing refill request to provider for review/approval because:  Drug not on the [facility], P or [facility] refill protocol or controlled substance

## 2018-06-02 DIAGNOSIS — M1A.00X0 IDIOPATHIC CHRONIC GOUT WITHOUT TOPHUS, UNSPECIFIED SITE: ICD-10-CM

## 2018-06-03 NOTE — TELEPHONE ENCOUNTER
"Requested Prescriptions   Pending Prescriptions Disp Refills     allopurinol (ZYLOPRIM) 100 MG tablet [Pharmacy Med Name: ALLOPURINOL 100 MG TABLET]  Last Written Prescription Date:  04/26/2018  Last Fill Quantity: 30,  # refills: 0   Last office visit: No previous visit found with prescribing provider:  LAKESHA LUU    Future Office Visit:     30 tablet 0     Sig: TAKE 1 TABLET (100 MG) BY MOUTH DAILY. *APPT NEEDED FOR REFILLS*    Gout Agents Protocol Failed    6/2/2018  9:16 AM       Failed - CBC on file in past 12 months    Recent Labs   Lab Test  10/26/15   0839   WBC  5.8   RBC  5.25   HGB  15.7   HCT  44.7   PLT  150       For [facility] ONLY: XMMS046 = WBC, XTSD044 = RBC         Failed - ALT on file in past 12 months    Recent Labs   Lab Test  10/10/16   1113   ALT  20            Failed - Uric acid greater than or equal to 6 on file in past 12 months    Recent Labs   Lab Test  10/26/15   0856   URIC  8.2     If level is 6mg/dL or greater, ok to refill one time and refer to provider.          Passed - Recent (12 mo) or future (30 days) visit within the authorizing provider's specialty    Patient had office visit in the last 12 months or has a visit in the next 30 days with authorizing provider or within the authorizing provider's specialty.  See \"Patient Info\" tab in inbasket, or \"Choose Columns\" in Meds & Orders section of the refill encounter.           Passed - Patient is age 18 or older       Passed - Normal serum creatinine on file in the past 12 months    Recent Labs   Lab Test  10/12/17   0921   CR  1.08               "

## 2018-06-04 RX ORDER — ALLOPURINOL 100 MG/1
TABLET ORAL
Qty: 30 TABLET | Refills: 0 | Status: SHIPPED | OUTPATIENT
Start: 2018-06-04 | End: 2018-06-28

## 2018-06-04 NOTE — TELEPHONE ENCOUNTER
Routing refill request to provider for review/approval because:  Dixie given x1 and patient did not follow up, please advise  Failed - CBC on file in past 12 months  Failed - Uric acid greater than or equal to 6 on file in past 12 months  Failed - ALT on file in past 12 months

## 2018-06-17 DIAGNOSIS — G47.00 INSOMNIA: ICD-10-CM

## 2018-06-17 DIAGNOSIS — F41.9 ANXIETY: ICD-10-CM

## 2018-06-18 NOTE — TELEPHONE ENCOUNTER
Requested Prescriptions   Pending Prescriptions Disp Refills     LORazepam (ATIVAN) 1 MG tablet [Pharmacy Med Name: LORAZEPAM 1 MG TABLET]      Last Written Prescription Date:  5/31/18  Last Fill Quantity: 30,   # refills: 1  Last Office Visit: 10/12/17 Jayme  [facility] Office visit:       Routing refill request to provider for review/approval because:  Drug not on the [facility], [facility] or [facility] refill protocol or controlled substance   30 tablet 1     Sig: TAKE 1 TABLET BY MOUTH EVERY 6 HOURS AS NEEDED FOR ANXIETY    There is no refill protocol information for this order

## 2018-06-21 NOTE — TELEPHONE ENCOUNTER
Controlled Substance Refill Request for LORazepam (ATIVAN) 1 MG tablet  Problem List Complete:  No   checked in past 6 months?  Yes 2/7/18

## 2018-06-22 RX ORDER — LORAZEPAM 1 MG/1
TABLET ORAL
Qty: 30 TABLET | Refills: 1 | Status: SHIPPED | OUTPATIENT
Start: 2018-06-22 | End: 2018-07-04

## 2018-06-28 DIAGNOSIS — M1A.00X0 IDIOPATHIC CHRONIC GOUT WITHOUT TOPHUS, UNSPECIFIED SITE: ICD-10-CM

## 2018-06-28 RX ORDER — ALLOPURINOL 100 MG/1
TABLET ORAL
Qty: 30 TABLET | Refills: 0 | Status: SHIPPED | OUTPATIENT
Start: 2018-06-28 | End: 2018-07-26

## 2018-06-28 NOTE — TELEPHONE ENCOUNTER
"Requested Prescriptions   Pending Prescriptions Disp Refills     allopurinol (ZYLOPRIM) 100 MG tablet [Pharmacy Med Name: ALLOPURINOL 100 MG TABLET] 30 tablet 0     Sig: TAKE 1 TABLET (100 MG) BY MOUTH DAILY. *APPT NEEDED FOR REFILLS*    Gout Agents Protocol Failed    6/28/2018  9:26 AM       Failed - CBC on file in past 12 months    Recent Labs   Lab Test  10/26/15   0839   WBC  5.8   RBC  5.25   HGB  15.7   HCT  44.7   PLT  150       For [facility] ONLY: MXJB472 = WBC, JACQ149 = RBC         Failed - ALT on file in past 12 months    Recent Labs   Lab Test  10/10/16   1113   ALT  20            Failed - Has Uric Acid on file in past 12 months and value is less than 6    Recent Labs   Lab Test  10/26/15   0856   URIC  8.2     If level is 6mg/dL or greater, ok to refill one time and refer to provider.          Passed - Recent (12 mo) or future (30 days) visit within the authorizing provider's specialty    Patient had office visit in the last 12 months or has a visit in the next 30 days with authorizing provider or within the authorizing provider's specialty.  See \"Patient Info\" tab in inbasket, or \"Choose Columns\" in Meds & Orders section of the refill encounter.           Passed - Patient is age 18 or older       Passed - Normal serum creatinine on file in the past 12 months    Recent Labs   Lab Test  10/12/17   0921   CR  1.08             Routing refill request to provider for review/approval because:  Dixie given x1 and patient did not follow up, please advise        "

## 2018-07-04 DIAGNOSIS — F51.02 TRANSIENT INSOMNIA: ICD-10-CM

## 2018-07-04 DIAGNOSIS — F41.9 ANXIETY: ICD-10-CM

## 2018-07-05 NOTE — TELEPHONE ENCOUNTER
Controlled Substance Refill Request for ZOLPIDEM TARTRATE 10 MG TABLET    Problem List Complete:  Yes  Noted:  6/14/2016       Priority:  Medium      Overview Addendum 2/7/2018  1:19 PM by Roxy Degroot RN     No CSA on file     Last  check -2-7-18- No concerns     Previous Version        checked in past 3 months?  No, route to RN     Controlled Substance Refill Request for LORazepam (ATIVAN) 1 MG tablet  Problem List Complete:  Yes  Noted:  11/3/2012       Priority:  Medium      Overview Addendum 6/21/2018  7:21 AM by Rita Fowler RN     Problem list name updated by automated process. Provider to review     No CSA on file  Last  check - 2/7/18- no concerns        checked in past 3 months?  No, route to RN

## 2018-07-09 RX ORDER — ZOLPIDEM TARTRATE 10 MG/1
TABLET ORAL
Qty: 30 TABLET | Refills: 1 | Status: SHIPPED | OUTPATIENT
Start: 2018-07-09 | End: 2018-09-04

## 2018-07-09 RX ORDER — LORAZEPAM 1 MG/1
TABLET ORAL
Qty: 30 TABLET | Refills: 1 | Status: SHIPPED | OUTPATIENT
Start: 2018-07-09 | End: 2018-07-21

## 2018-07-10 NOTE — TELEPHONE ENCOUNTER
RX for zolpidem and lorazapam have been faxed on 7/9,  4:45 pm to [facility] [phone]

## 2018-07-21 DIAGNOSIS — F41.9 ANXIETY: ICD-10-CM

## 2018-07-23 NOTE — TELEPHONE ENCOUNTER
Controlled Substance Refill Request for LORazepam (ATIVAN) 1 MG tablet  Problem List Complete:  No     PROVIDER TO CONSIDER COMPLETION OF PROBLEM LIST AND OVERVIEW/CONTROLLED SUBSTANCE AGREEMENT      Processing:  Fax Rx to [facility] pharmacy   checked in past 3 months?  Yes 07/23/2018-Rx was last filled 07/15/2018 #30 tablets.

## 2018-07-23 NOTE — TELEPHONE ENCOUNTER
Controlled Substance Refill Request for LORazepam (ATIVAN) 1 MG tablet  Problem List Complete:  No     PROVIDER TO CONSIDER COMPLETION OF PROBLEM LIST AND OVERVIEW/CONTROLLED SUBSTANCE AGREEMENT    Last Written Prescription Date:  7/9/2018  Last Fill Quantity: 30 tablet,   # refills: 1    [facility] [place]: 10/12/2017    Future Office visit:     Controlled substance agreement on file: No.     Processing:  F[facility]ed [facility]   checked in past 3 months?  No, route to RN    checked 2-7-18- No concerns

## 2018-07-24 RX ORDER — LORAZEPAM 1 MG/1
TABLET ORAL
Qty: 30 TABLET | Refills: 1 | Status: SHIPPED | OUTPATIENT
Start: 2018-07-24 | End: 2018-08-04

## 2018-07-26 DIAGNOSIS — M1A.00X0 IDIOPATHIC CHRONIC GOUT WITHOUT TOPHUS, UNSPECIFIED SITE: ICD-10-CM

## 2018-07-26 NOTE — TELEPHONE ENCOUNTER
"Requested Prescriptions   Pending Prescriptions Disp Refills     allopurinol (ZYLOPRIM) 100 MG tablet [Pharmacy Med Name: ALLOPURINOL 100 MG TABLET]  Last Written Prescription Date:  6/28/2018  Last Fill Quantity: 30 tablet,  # refills: 0   Last Office Visit  No previous visit found        with  [facility], [facility] or [facility] prescribing provider:     Future Office Visit:      30 tablet 0     Sig: TAKE 1 TABLET (100 MG) BY MOUTH DAILY. *APPT NEEDED FOR REFILLS*    Gout Agents Protocol Failed    7/26/2018 10:16 AM       Failed - CBC on file in past 12 months    Recent Labs   Lab Test  10/26/15   0839   WBC  5.8   RBC  5.25   HGB  15.7   HCT  44.7   PLT  150     For [facility] ONLY: [id number] = WBC, [id number] = RBC         Failed - ALT on file in past 12 months    Recent Labs   Lab Test  10/10/16   1113   ALT  20          Failed - Has Uric Acid on file in past 12 months and value is less than 6    Recent Labs   Lab Test  10/26/15   0856   URIC  8.2     If level is 6mg/dL or greater, ok to refill one time and refer to provider.          Passed - Recent (12 mo) or future (30 days) visit within the authorizing provider's specialty    Patient had office visit in the last 12 months or has a visit in the next 30 days with authorizing provider or within the authorizing provider's specialty.  See \"Patient Info\" tab in inbasket, or \"Choose Columns\" in Meds & Orders section of the refill encounter.           Passed - Patient is age 18 or older       Passed - Normal serum creatinine on file in the past 12 months    Recent Labs   Lab Test  10/12/17   0921   CR  1.08               "

## 2018-07-27 RX ORDER — ALLOPURINOL 100 MG/1
TABLET ORAL
Qty: 30 TABLET | Refills: 0 | Status: SHIPPED | OUTPATIENT
Start: 2018-07-27 | End: 2018-08-21

## 2018-07-27 NOTE — TELEPHONE ENCOUNTER
Routing refill request to provider for review/approval because:  Labs not current:  Related to medication.

## 2018-08-04 DIAGNOSIS — F41.9 ANXIETY: ICD-10-CM

## 2018-08-04 DIAGNOSIS — G47.00 INSOMNIA: ICD-10-CM

## 2018-08-06 NOTE — TELEPHONE ENCOUNTER
Requested Prescriptions   Pending Prescriptions Disp Refills     LORazepam (ATIVAN) 1 MG tablet [Pharmacy Med Name: LORAZEPAM 1 MG TABLET]      Last Written Prescription Date:  7/24/18  Last Fill Quantity: 30,   # refills: 1  Last Office Visit: 10/12/17 Jayme  [facility] Office visit:       Routing refill request to provider for review/approval because:  Drug not on the [facility], [facility] or [facility] refill protocol or controlled substance   30 tablet 1     Sig: TAKE 1 TABLET BY MOUTH EVERY 6 HOURS AS NEEDED    There is no refill protocol information for this order

## 2018-08-06 NOTE — TELEPHONE ENCOUNTER
Controlled Substance Refill Request for LORazepam (ATIVAN) 1 MG tablet  Problem List Complete:  No   checked in past 3 months?  Yes 8/6/18

## 2018-08-07 RX ORDER — LORAZEPAM 1 MG/1
TABLET ORAL
Qty: 30 TABLET | Refills: 1 | Status: SHIPPED | OUTPATIENT
Start: 2018-08-07 | End: 2018-08-20

## 2018-08-20 DIAGNOSIS — F41.9 ANXIETY: ICD-10-CM

## 2018-08-21 DIAGNOSIS — M1A.00X0 IDIOPATHIC CHRONIC GOUT WITHOUT TOPHUS, UNSPECIFIED SITE: ICD-10-CM

## 2018-08-21 NOTE — TELEPHONE ENCOUNTER
Requested Prescriptions   Pending Prescriptions Disp Refills     LORazepam (ATIVAN) 1 MG tablet [Pharmacy Med Name: LORAZEPAM 1 MG TABLET] 30 tablet 1     Sig: TAKE 1 TABLET BY MOUTH EVERY 6 HOURS AS NEEDED    There is no refill protocol information for this order        Last Written Prescription Date:  8/7/18  Last Fill Quantity: 30,   # refills: 1  Last Office Visit: 10/12/17  Future Office visit:    Next 5 appointments (look out 90 days)     Sep 10, 2018  8:30 AM CDT   [facility]    [address] [place] [id number] [phone]                   Routing refill request to provider for review/approval because:  Drug not on the [facility], [facility] or [facility] refill protocol or controlled substance

## 2018-08-21 NOTE — TELEPHONE ENCOUNTER
"Requested Prescriptions   Pending Prescriptions Disp Refills     allopurinol (ZYLOPRIM) 100 MG tablet [Pharmacy Med Name: ALLOPURINOL 100 MG TABLET]  Last Written Prescription Date:  7/27/18  Last Fill Quantity: 30,  # refills: 0   Last office visit: 10/12/17 Jayme   Future Office Visit:   Next 5 appointments (look out 90 days)     Sep 10, 2018  8:30 AM CDT   [facility]    [address]  [address] [place] [id number] [phone]                  30 tablet 0     Sig: TAKE 1 TABLET (100 MG) BY MOUTH DAILY. *APPT NEEDED FOR REFILLS*    Gout Agents Protocol Failed    8/21/2018 10:00 AM       Failed - CBC on file in past 12 months    Recent Labs   Lab Test  10/26/15   0839   WBC  5.8   RBC  5.25   HGB  15.7   HCT  44.7   PLT  150       For [facility] ONLY: [id number] = WBC, [id number] = RBC         Failed - ALT on file in past 12 months    Recent Labs   Lab Test  10/10/16   1113   ALT  20            Failed - Has Uric Acid on file in past 12 months and value is less than 6    Recent Labs   Lab Test  10/26/15   0856   URIC  8.2     If level is 6mg/dL or greater, ok to refill one time and refer to provider.          Passed - Recent (12 mo) or future (30 days) visit within the authorizing provider's specialty    Patient had office visit in the last 12 months or has a visit in the next 30 days with authorizing provider or within the authorizing provider's specialty.  See \"Patient Info\" tab in inbasket, or \"Choose Columns\" in Meds & Orders section of the refill encounter.           Passed - Patient is age 18 or older       Passed - Normal serum creatinine on file in the past 12 months    Recent Labs   Lab Test  10/12/17   0921   CR  1.08               "

## 2018-08-22 RX ORDER — ALLOPURINOL 100 MG/1
TABLET ORAL
Qty: 30 TABLET | Refills: 0 | Status: SHIPPED | OUTPATIENT
Start: 2018-08-22 | End: 2018-09-10

## 2018-08-22 NOTE — TELEPHONE ENCOUNTER
Controlled Substance Refill Request for LORazepam (ATIVAN) 1 MG tablet  Problem List Complete:  No     PROVIDER TO CONSIDER COMPLETION OF PROBLEM LIST AND OVERVIEW/CONTROLLED SUBSTANCE AGREEMENT    Controlled substance agreement on file: No.     Processing:  Fax Rx to [facility] pharmacy   checked in past 3 months?  Yes 8/6/18

## 2018-08-22 NOTE — TELEPHONE ENCOUNTER
A 30 day supply is given, patient is due for an office visit.  Patient is scheduled for an appointment on 9/10/2018.  Suellen Foster RN  [employer] [facility]

## 2018-08-23 RX ORDER — LORAZEPAM 1 MG/1
TABLET ORAL
Qty: 30 TABLET | Refills: 1 | Status: SHIPPED | OUTPATIENT
Start: 2018-08-23 | End: 2018-09-01

## 2018-09-01 DIAGNOSIS — F41.9 ANXIETY: ICD-10-CM

## 2018-09-04 DIAGNOSIS — F41.9 ANXIETY: ICD-10-CM

## 2018-09-04 DIAGNOSIS — F51.02 TRANSIENT INSOMNIA: ICD-10-CM

## 2018-09-04 RX ORDER — LORAZEPAM 1 MG/1
TABLET ORAL
Qty: 30 TABLET | Refills: 1 | Status: SHIPPED | OUTPATIENT
Start: 2018-09-04 | End: 2018-09-04

## 2018-09-04 NOTE — TELEPHONE ENCOUNTER
Controlled Substance Refill Request for LORazepam (ATIVAN) 1 MG tablet  Problem List Complete:  No     PROVIDER TO CONSIDER COMPLETION OF PROBLEM LIST AND OVERVIEW/CONTROLLED SUBSTANCE AGREEMENT    Last Written Prescription Date:  8/23/2018  Last Fill Quantity: 30 tablet,   # refills: 1    Last Office Visit with [facility] primary care provider: 10/12/2017    Future Office visit:   Next 5 appointments (look out 90 days)     Sep 10, 2018  8:30 AM CDT   [facility]    [address] [place] [address] [phone]                  Controlled substance agreement on file: No.     Processing:  Patient will  in clinic   checked in past 3 months?  Yes 8/6/18 No concerns

## 2018-09-05 NOTE — TELEPHONE ENCOUNTER
LORAZEPAM 1 MG TABLET      Last Written Prescription Date:  9/4/2018  Last Fill Quantity: 30,   # refills: 1  Last Office Visit: 10/12/2017  Future Office visit:    Next 5 appointments (look out 90 days)     Sep 10, 2018  8:30 AM CDT   [facility]   [facility]    [address] [place] [id number]   [phone]                   Routing refill request to provider for review/approval because:  Drug not on the [facility], [facility] or [facility] refill protocol or controlled substance      ZOLPIDEM TARTRATE 10 MG TABLET      Last Written Prescription Date:  7/9/2018  Last Fill Quantity: 30,   # refills: 1  Last Office Visit: 10/12/2017  Future Office visit:    Next 5 appointments (look out 90 days)     Sep 10, 2018  8:30 AM CDT   PHYSICAL with Stu [facility]ruz [facility]   [facility]    [address] [place] [address]   [phone]                   Routing refill request to provider for review/approval because:  Drug not on the [facility], [facility] or [facility] refill protocol or controlled substance

## 2018-09-06 DIAGNOSIS — F51.02 TRANSIENT INSOMNIA: ICD-10-CM

## 2018-09-06 DIAGNOSIS — F41.9 ANXIETY: ICD-10-CM

## 2018-09-06 RX ORDER — LORAZEPAM 1 MG/1
TABLET ORAL
Qty: 30 TABLET | Refills: 1 | Status: SHIPPED | OUTPATIENT
Start: 2018-09-06 | End: 2018-09-23

## 2018-09-06 RX ORDER — ZOLPIDEM TARTRATE 10 MG/1
TABLET ORAL
Qty: 30 TABLET | Refills: 1 | Status: SHIPPED | OUTPATIENT
Start: 2018-09-06 | End: 2018-09-06

## 2018-09-06 NOTE — TELEPHONE ENCOUNTER
Requested Prescriptions   Pending Prescriptions Disp Refills     zolpidem (AMBIEN) 10 MG tablet [Pharmacy Med Name: ZOLPIDEM TARTRATE 10 MG TABLET]      Last Written Prescription Date:  9/6/18  Last Fill Quantity: 30,   # refills: 1  Last Office Visit: 10/12/17 Jayme  Future Office visit:    Next 5 appointments (look out 90 days)     Sep 10, 2018  8:30 AM CDT   [facility]YSICAL [facility]   [facility]    [address] [place] [address]   [phone]                   Routing refill request to provider for review/approval because:  Drug not on the [facility], [facility] or  [facility] refill protocol or controlled substance   30 tablet 1     Sig: TAKE 1 TABLET BY MOUTH AT BEDTIME AS NEEDED    There is no refill protocol information for this order

## 2018-09-08 ENCOUNTER — MYC MEDICAL ADVICE (OUTPATIENT)
Dept: FAMILY MEDICINE | Facility: CLINIC | Age: 63
End: 2018-09-08

## 2018-09-10 ENCOUNTER — TELEPHONE (OUTPATIENT)
Dept: FAMILY MEDICINE | Facility: CLINIC | Age: 63
End: 2018-09-10

## 2018-09-10 ENCOUNTER — OFFICE VISIT (OUTPATIENT)
Dept: FAMILY MEDICINE | Facility: CLINIC | Age: 63
End: 2018-09-10
Payer: COMMERCIAL

## 2018-09-10 VITALS
RESPIRATION RATE: 16 BRPM | OXYGEN SATURATION: 97 % | BODY MASS INDEX: 26.07 KG/M2 | WEIGHT: 176 LBS | DIASTOLIC BLOOD PRESSURE: 72 MMHG | HEART RATE: 72 BPM | SYSTOLIC BLOOD PRESSURE: 124 MMHG | TEMPERATURE: 96 F | HEIGHT: 69 IN

## 2018-09-10 DIAGNOSIS — E55.9 VITAMIN D INSUFFICIENCY: Chronic | ICD-10-CM

## 2018-09-10 DIAGNOSIS — Z00.00 ROUTINE GENERAL MEDICAL EXAMINATION AT A HEALTH CARE FACILITY: Primary | ICD-10-CM

## 2018-09-10 DIAGNOSIS — Z23 NEED FOR PROPHYLACTIC VACCINATION AND INOCULATION AGAINST INFLUENZA: ICD-10-CM

## 2018-09-10 DIAGNOSIS — R37 SEXUAL DYSFUNCTION: ICD-10-CM

## 2018-09-10 DIAGNOSIS — M1A.9XX0 CHRONIC GOUT INVOLVING TOE OF LEFT FOOT WITHOUT TOPHUS, UNSPECIFIED CAUSE: ICD-10-CM

## 2018-09-10 DIAGNOSIS — Z23 NEED FOR VACCINATION: ICD-10-CM

## 2018-09-10 DIAGNOSIS — H81.11 BPV (BENIGN POSITIONAL VERTIGO), RIGHT: ICD-10-CM

## 2018-09-10 DIAGNOSIS — F51.01 PRIMARY INSOMNIA: ICD-10-CM

## 2018-09-10 DIAGNOSIS — Z12.11 SPECIAL SCREENING FOR MALIGNANT NEOPLASMS, COLON: ICD-10-CM

## 2018-09-10 DIAGNOSIS — C61 MALIGNANT NEOPLASM OF PROSTATE (H): Primary | ICD-10-CM

## 2018-09-10 DIAGNOSIS — M1A.00X0 IDIOPATHIC CHRONIC GOUT WITHOUT TOPHUS, UNSPECIFIED SITE: ICD-10-CM

## 2018-09-10 DIAGNOSIS — E78.5 HYPERLIPIDEMIA LDL GOAL <100: ICD-10-CM

## 2018-09-10 DIAGNOSIS — Z12.5 SPECIAL SCREENING FOR MALIGNANT NEOPLASM OF PROSTATE: ICD-10-CM

## 2018-09-10 DIAGNOSIS — N52.9 IMPOTENCE OF ORGANIC ORIGIN: ICD-10-CM

## 2018-09-10 DIAGNOSIS — M51.369 DDD (DEGENERATIVE DISC DISEASE), LUMBAR: ICD-10-CM

## 2018-09-10 DIAGNOSIS — F41.9 ANXIETY: ICD-10-CM

## 2018-09-10 DIAGNOSIS — H81.12 BPV (BENIGN POSITIONAL VERTIGO), LEFT: ICD-10-CM

## 2018-09-10 PROCEDURE — 90471 IMMUNIZATION ADMIN: CPT | Performed by: FAMILY MEDICINE

## 2018-09-10 PROCEDURE — 82306 VITAMIN D 25 HYDROXY: CPT | Performed by: FAMILY MEDICINE

## 2018-09-10 PROCEDURE — 36415 COLL VENOUS BLD VENIPUNCTURE: CPT | Performed by: FAMILY MEDICINE

## 2018-09-10 PROCEDURE — 84550 ASSAY OF BLOOD/URIC ACID: CPT | Performed by: FAMILY MEDICINE

## 2018-09-10 PROCEDURE — G0103 PSA SCREENING: HCPCS | Performed by: FAMILY MEDICINE

## 2018-09-10 PROCEDURE — 80061 LIPID PANEL: CPT | Performed by: FAMILY MEDICINE

## 2018-09-10 PROCEDURE — 99396 PREV VISIT EST AGE 40-64: CPT | Mod: 25 | Performed by: FAMILY MEDICINE

## 2018-09-10 PROCEDURE — 80048 BASIC METABOLIC PNL TOTAL CA: CPT | Performed by: FAMILY MEDICINE

## 2018-09-10 PROCEDURE — 90686 IIV4 VACC NO PRSV 0.5 ML IM: CPT | Performed by: FAMILY MEDICINE

## 2018-09-10 RX ORDER — ZOLPIDEM TARTRATE 10 MG/1
TABLET ORAL
Qty: 30 TABLET | Refills: 1 | Status: SHIPPED | OUTPATIENT
Start: 2018-09-10 | End: 2018-11-10

## 2018-09-10 RX ORDER — SILDENAFIL 100 MG/1
100 TABLET, FILM COATED ORAL DAILY PRN
Qty: 12 TABLET | Refills: 11 | Status: SHIPPED | OUTPATIENT
Start: 2018-09-10 | End: 2018-09-10

## 2018-09-10 RX ORDER — SILDENAFIL 100 MG/1
100 TABLET, FILM COATED ORAL DAILY PRN
Qty: 12 TABLET | Refills: 11 | Status: SHIPPED | OUTPATIENT
Start: 2018-09-10 | End: 2018-10-15

## 2018-09-10 RX ORDER — ALLOPURINOL 100 MG/1
TABLET ORAL
Qty: 30 TABLET | Refills: 11 | Status: SHIPPED | OUTPATIENT
Start: 2018-09-10 | End: 2018-12-14

## 2018-09-10 NOTE — PROGRESS NOTES
SUBJECTIVE:   CC: Sergio Estevez is an 63 year old male who presents for preventative health visit.     Physical   Annual:     Getting at least 3 servings of Calcium per day:  Yes    Bi-annual eye exam:  Yes    Dental care twice a year:  Yes    Sleep apnea or symptoms of sleep apnea:  None    Diet:  Regular (no restrictions)    Frequency of exercise:  4-5 days/week    Duration of exercise:  15-30 minutes    Taking medications regularly:  Yes    Medication side effects:  None    Additional concerns today:  No        Wants a uric acid test  Colonoscopy  Vertigo when moving head    Today's PHQ-2 Score:   PHQ-2 ( 1999 Pfizer) 9/10/2018   Q1: Little interest or pleasure in doing things 0   Q2: Feeling down, depressed or hopeless 0   PHQ-2 Score 0   Q1: Little interest or pleasure in doing things Not at all   Q2: Feeling down, depressed or hopeless Not at all   PHQ-2 Score 0       Abuse: Current or Past(Physical, Sexual or Emotional)- No  Do you feel safe in your environment - Yes    Social History   Substance Use Topics     Smoking status: Former Smoker     Quit date: 11/3/1982     Smokeless tobacco: Never Used     Alcohol use Yes      Comment: ocasionally     Alcohol Use 9/10/2018   If you drink alcohol do you typically have greater than 3 drinks per day OR greater than 7 drinks per week? No       Last PSA:   PSA   Date Value Ref Range Status   10/12/2017 0.02 0 - 4 ug/L Final     Comment:     Assay Method:  Chemiluminescence using Siemens Vista analyzer       Reviewed orders with patient. Reviewed health maintenance and updated orders accordingly - Yes    Labs reviewed in EPIC  BP Readings from Last 3 Encounters:   09/10/18 124/72   11/20/17 131/86   10/12/17 114/80    Wt Readings from Last 3 Encounters:   09/10/18 176 lb (79.8 kg)   10/12/17 175 lb (79.4 kg)   06/01/17 175 lb 8 oz (79.6 kg)                  Patient Active Problem List   Diagnosis     Mixed hyperlipidemia     Mixed hearing loss, unilateral      Malignant neoplasm of prostate (H)     Insomnia     Hyperlipidemia LDL goal <100     Gout     Vitamin D insufficiency     Impotence of organic origin     History of prostate cancer     History of gout     Anxiety     Transient insomnia     Routine general medical examination at a health care facility     ACP (advance care planning)     BPV (benign positional vertigo), left     BPV (benign positional vertigo), right     DDD (degenerative disc disease), lumbar     Past Surgical History:   Procedure Laterality Date     OTHER SURGICAL HISTORY  1976    4 toes amputated from industrial accident       Social History   Substance Use Topics     Smoking status: Former Smoker     Quit date: 11/3/1982     Smokeless tobacco: Never Used     Alcohol use Yes      Comment: ocasionally     Family History   Problem Relation Age of Onset     Cancer Mother      Breast Cancer Mother      Alzheimer Disease Father          Current Outpatient Prescriptions   Medication Sig Dispense Refill     allopurinol (ZYLOPRIM) 100 MG tablet TAKE 1 TABLET (100 MG) BY MOUTH DAILY. *APPT NEEDED FOR REFILLS* 30 tablet 11     colchicine (COLCRYS) 0.6 MG tablet Take 1 tablet (0.6 mg) by mouth daily 90 tablet 3     Ibuprofen (IBU PO) Take  by mouth.       indomethacin (INDOCIN) 25 MG capsule Take 1 capsule (25 mg) by mouth 2 times daily (with meals) 42 capsule 1     LORazepam (ATIVAN) 1 MG tablet TAKE 1 TABLET BY MOUTH EVERY 6 HOURS AS NEEDED 30 tablet 1     Multiple Vitamins-Minerals (MULTI FOR HIM 50+) TABS Take 1 tablet by mouth daily 30 tablet 11     Plant Sterols and Stanols 450 MG TABS Take 2 tablets by mouth 2 times daily 12 tablet PRN     Red Yeast Rice Extract ([employer] RED YEAST RICE) 600 MG CAPS Take 2 capsules by mouth 2 times daily (with meals) 120 capsule 11     sildenafil (VIAGRA) 100 MG tablet Take 1 tablet (100 mg) by mouth daily as needed 30 min to 4 hrs before sex. Do not use with nitroglycerin, terazosin or doxazosin. 12 tablet 11     Vitamin  D, Cholecalciferol, 1000 UNITS CAPS Take 1 tablet by mouth daily 30 capsule 11     zolpidem (AMBIEN) 10 MG tablet TAKE 1 TABLET BY MOUTH AT BEDTIME AS NEEDED 30 tablet 1     [DISCONTINUED] sildenafil (VIAGRA) 100 MG tablet Take 1 tablet (100 mg) by mouth daily as needed 30 min to 4 hrs before sex. Do not use with nitroglycerin, terazosin or doxazosin. 12 tablet 11     Allergies   Allergen Reactions     Keflex [Cephalexin Hcl]      Recent Labs   Lab Test  10/12/17   0921  10/10/16   1113  10/26/15   0839  10/01/14   0845   02/04/13   0835   LDL  120*  135*  124  128   < >  140*   HDL  48  49  44  57   < >  41   TRIG  119  110  119  37   < >  123   ALT   --   20  19  21   < >  19   CR  1.08  1.28*   --   1.20   < >  1.30*   GFRESTIMATED  69  57*   --   62   < >  57*   GFRESTBLACK  84  69   --   75   < >  69   POTASSIUM  4.4  4.3   --   4.5   < >  4.5   TSH   --    --    --    --    --   1.95    < > = values in this interval not displayed.        Reviewed and updated as needed this visit by clinical staff  Tobacco  Allergies  Meds  Problems  Med Hx  Surg Hx  Fam Hx  Soc Hx          Reviewed and updated as needed this visit by Provider  Allergies  Meds  Problems        Past Medical History:   Diagnosis Date     Bronchitis      DDD (degenerative disc disease), lumbar 6/1/2017     Malignant neoplasm (H) prostate     Pneumonia       Past Surgical History:   Procedure Laterality Date     OTHER SURGICAL HISTORY  1976    4 toes amputated from industrial accident       Review of Systems  CONSTITUTIONAL: NEGATIVE for fever, chills, change in weight  INTEGUMENTARY/SKIN:  MULTIPLE SENILE KERATOSES  NO BACK   EYES: NEGATIVE for vision changes or irritation  ENT: NEGATIVE for ear, mouth and throat problems  RESP: NEGATIVE for significant cough or SOB  BREAST: NEGATIVE for masses, tenderness or discharge  CV: NEGATIVE for chest pain, palpitations or peripheral edema  GI: NEGATIVE for nausea, abdominal pain, heartburn, or  "change in bowel habits   male: negative for dysuria, hematuria, decreased urinary stream, erectile dysfunction, urethral discharge  MUSCULOSKELETAL: NEGATIVE for significant arthralgias or myalgia  NEURO: NEGATIVE for weakness, dizziness or paresthesias  ENDOCRINE: NEGATIVE for temperature intolerance, skin/hair changes  PSYCHIATRIC: NEGATIVE for changes in mood or affect    OBJECTIVE:   /72 (Cuff Size: Adult Large)  Pulse 72  Temp 96  F (35.6  C) (Tympanic)  Resp 16  Ht 5' 8.5\" (1.74 m)  Wt 176 lb (79.8 kg)  SpO2 97%  BMI 26.37 kg/m2    Physical Exam  GENERAL: healthy, alert and no distress  EYES: Eyes grossly normal to inspection, PERRL and conjunctivae and sclerae normal  HENT: ear canals and TM's normal, nose and mouth without ulcers or lesions  NECK: no adenopathy, no asymmetry, masses, or scars and thyroid normal to palpation  RESP: lungs clear to auscultation - no rales, rhonchi or wheezes  CV: regular rate and rhythm, normal S1 S2, no S3 or S4, no murmur, click or rub, no peripheral edema and peripheral pulses strong  ABDOMEN: soft, nontender, no hepatosplenomegaly, no masses and bowel sounds normal   (male): normal male genitalia without lesions or urethral discharge, no hernia  RECTAL: normal sphincter tone, no rectal masses, prostate normal size, smooth, nontender without nodules or masses  MS: no gross musculoskeletal defects noted, no edema  SKIN: no suspicious lesions or rashes  NEURO: Normal strength and tone, mentation intact and speech normal  PSYCH: mentation appears normal, affect normal/bright    Diagnostic Test Results:  Results for orders placed or performed in visit on 10/12/17   Basic metabolic panel   Result Value Ref Range    Sodium 140 133 - 144 mmol/L    Potassium 4.4 3.4 - 5.3 mmol/L    Chloride 106 94 - 109 mmol/L    Carbon Dioxide 29 20 - 32 mmol/L    Anion Gap 5 3 - 14 mmol/L    Glucose 87 70 - 99 mg/dL    Urea Nitrogen 14 7 - 30 mg/dL    Creatinine 1.08 0.66 - 1.25 " mg/dL    GFR Estimate 69 >60 mL/min/1.7m2    GFR Estimate If Black 84 >60 mL/min/1.7m2    Calcium 8.9 8.5 - 10.1 mg/dL   Lipid panel reflex to direct LDL   Result Value Ref Range    Cholesterol 192 <200 mg/dL    Triglycerides 119 <150 mg/dL    HDL Cholesterol 48 >39 mg/dL    LDL Cholesterol Calculated 120 (H) <100 mg/dL    Non HDL Cholesterol 144 (H) <130 mg/dL   Vitamin D Deficiency   Result Value Ref Range    Vitamin D Deficiency screening 50 20 - 75 ug/L   Prostate spec antigen screen   Result Value Ref Range    PSA 0.02 0 - 4 ug/L       ASSESSMENT/PLAN:       ICD-10-CM    1. Routine general medical examination at a health care facility Z00.00 Basic metabolic panel   2. Need for vaccination Z23    3. Need for prophylactic vaccination and inoculation against influenza Z23 FLU VACCINE, SPLIT VIRUS, IM (QUADRIVALENT) [69299]- >3 YRS     Vaccine Administration, Initial [95820]   4. Hyperlipidemia LDL goal <100 E78.5 Lipid panel reflex to direct LDL Fasting   5. BPV (benign positional vertigo), left H81.12    6. BPV (benign positional vertigo), right H81.11    7. Anxiety F41.9    8. Primary insomnia F51.01    9. Vitamin D insufficiency E55.9 Vitamin D Deficiency   10. DDD (degenerative disc disease), lumbar M51.36    11. Chronic gout involving toe of left foot without tophus, unspecified cause M1A.9XX0 Uric acid   12. Special screening for malignant neoplasm of prostate Z12.5 Prostate spec antigen screen   13. Special screening for malignant neoplasms, colon Z12.11 GASTROENTEROLOGY ADULT REF PROCEDURE ONLY Dahlia Mcmullen [phone]; No Provider Preference   14. Sexual dysfunction R37 sildenafil (VIAGRA) 100 MG tablet     DISCONTINUED: sildenafil (VIAGRA) 100 MG tablet   15. Idiopathic chronic gout without tophus, unspecified site M1A.00X0 allopurinol (ZYLOPRIM) 100 MG tablet       COUNSELING:   Reviewed preventive health counseling, as reflected in patient instructions       Consider AAA screening for ages  "65-75 and smoking history       Regular exercise       Healthy diet/nutrition       Vision screening       Hearing screening    BP Readings from Last 1 Encounters:   09/10/18 124/72     Estimated body mass index is 26.37 kg/(m^2) as calculated from the following:    Height as of this encounter: 5' 8.5\" (1.74 m).    Weight as of this encounter: 176 lb (79.8 kg).           reports that he quit smoking about 35 years ago. He has never used smokeless tobacco.      Counseling Resources:  ATP IV Guidelines  Pooled Cohorts Equation Calculator  FRAX Risk Assessment  ICSI Preventive Guidelines  Dietary Guidelines for Americans, 2010  [employer]'s MyPlate  ASA Prophylaxis  Lung CA Screening    TARSHA LUU MD  [facility]    Injectable Influenza Immunization Documentation    1.  Is the person to be vaccinated sick today?   No    2. Does the person to be vaccinated have an allergy to a component   of the vaccine?   No  Egg Allergy Algorithm Link    3. Has the person to be vaccinated ever had a serious reaction   to influenza vaccine in the past?   No    4. Has the person to be vaccinated ever had Guillain-Barré syndrome?   No    Form completed by  YUE BRAND NA          "

## 2018-09-10 NOTE — TELEPHONE ENCOUNTER
Prior Authorization Request     1. Prior Authorization for the medication sildenafil (VIAGRA) 100 MG tablet        Requesting Provider: Stu Delacruz           Pt name: Sergio PATRICK Zenaida        Pt : 1955        Pt MRN: [id number]        Last Office Visit: 9/10/2018            Insurance: Payor: [employer] / Plan: [employer] / Product Type: HMO /         Insurance ID Number: [unfilled]        Prior Auth Contact Phone number:                           BIN#:                        PCN#:            To be completed by provider:      2.   Refuse or Start Prior Auth:  Please start Prior Auth.        If requesting prior auth initiation:        Diagnosis (with code): Sexual dysfunction (R37) (medically necessary due to prostate being removed in )       Patient has been on this medication since: new start medication

## 2018-09-10 NOTE — TELEPHONE ENCOUNTER
Reason for Call:  Other call back  Detailed comments: patient has question on medication sildenafil (VIAGRA) 100 MG tablet  Phone Number Patient can be reached at: Home number on file [phone] (home)  Best Time: any  Can we leave a detailed message on this number? YES  Call taken on 9/10/2018 at 12:57 PM by SHANNON TESFAYE

## 2018-09-10 NOTE — TELEPHONE ENCOUNTER
Patient was called back, his insurance requires a prior authorization for the medication. Prior authorization sent.

## 2018-09-10 NOTE — LETTER
[facility]  [address]  [address]  [place] [address]  [phone]                                                                                                           Sergio Estevez  [address]  [facility] [place] [facility] [address]    September 16, 2018    Concerning             FOR PRIOR AUTHORIZATION     HE HAS TRIED LEVITRA AND CIALIS WITHOUT BENEFIT     IT IS MY OPINION THAT THIS PRIOR AUTHORIZATION SHOULD GO THROUGH AS WELL, BECAUSE OF DEFINITE ORGANIC CAUSES     BECAUSE OF THE     HE HAS HAD PENILE INJECTION IN PAST     SEXUAL DYSFUNCTION RELATED TO PROSTATE CANCER SURGERY IN 2012    (C61) Malignant neoplasm of prostate (H)  (primary encounter diagnosis)    Comment:      Plan:           (N52.9) Impotence of organic origin    Comment:      Plan:   I STRONGLY RECOMMEND THAT VIAGRA 100MG BE COVERED FOR THIS PATIENT     \    Thank you for choosing [facility].  We appreciate the opportunity to serve you and look forward to supporting your healthcare needs in the future.    If you have any questions or concerns, please call me or my staff at [phone].      Sincerely,    Stu Delacruz Jr MD

## 2018-09-10 NOTE — MR AVS SNAPSHOT
After Visit Summary   9/10/2018    Sergio Estevez    MRN: [id number]           Patient Information     Date Of Birth          1955        Visit Information        Provider Department      9/10/2018 8:30 AM Stu Delacruz MD [facility]        Today's Diagnoses     Routine general medical examination at a health care facility    -  1    Need for vaccination        Need for prophylactic vaccination and inoculation against influenza        Hyperlipidemia LDL goal <100        BPV (benign positional vertigo), left        BPV (benign positional vertigo), right        Anxiety        Primary insomnia        Vitamin D insufficiency        DDD (degenerative disc disease), lumbar        Chronic gout involving toe of left foot without tophus, unspecified cause        Special screening for malignant neoplasm of prostate        Special screening for malignant neoplasms, colon        Sexual dysfunction        Idiopathic chronic gout without tophus, unspecified site          Care Instructions    (Z00.00) Routine general medical examination at a health care facility  (primary encounter diagnosis)  Comment:    Plan: Basic metabolic panel             (Z23) Need for vaccination  Comment:    Plan:      (Z23) Need for prophylactic vaccination and inoculation against influenza  Comment:    Plan: FLU VACCINE, SPLIT VIRUS, IM (QUADRIVALENT)         [17468]- >3 YRS, Vaccine Administration,         Initial [09686]             (E78.5) Hyperlipidemia LDL goal <100  Comment:    Plan: Lipid panel reflex to direct LDL Fasting             (H81.12) BPV (benign positional vertigo), left  Comment:    Plan:      (H81.11) BPV (benign positional vertigo), right  Comment:    Plan:      (F41.9) Anxiety  Comment:    Plan:      (F51.01) Primary insomnia  Comment:    Plan:      (E55.9) Vitamin D insufficiency  Comment:    Plan: Vitamin D Deficiency             (M51.36) DDD (degenerative disc  disease), lumbar  Comment:    Plan:      (M1A.9XX0) Chronic gout involving toe of left foot without tophus, unspecified cause  Comment:    Plan: Uric acid             (Z12.5) Special screening for malignant neoplasm of prostate  Comment:    Plan: Prostate spec antigen screen             (Z12.11) Special screening for malignant neoplasms, colon  Comment:    Plan: GASTROENTEROLOGY ADULT REF PROCEDURE ONLY         Dahlia Mcmullen [phone]; No Provider        Preference                           Follow-ups after your visit        Additional Services     GASTROENTEROLOGY ADULT REF PROCEDURE ONLY Dahlia Vargasierge [phone]; No Provider Preference       Last Lab Result: Creatinine (mg/dL)       Date                     Value                 10/12/2017               1.08             ----------  Body mass index is 26.37 kg/(m^2).     Needed:  No  Language:  English    Patient will be contacted to schedule procedure.     Please be aware that coverage of these services is subject to the terms and limitations of your health insurance plan.  Call member services at your health plan with any benefit or coverage questions.  Any procedures must be performed at a [employer] facility OR coordinated by your clinic's referral office.    Please bring the following with you to your appointment:    (1) Any X-Rays, CTs or MRIs which have been performed.  Contact the facility where they were done to arrange for  prior to your scheduled appointment.    (2) List of current medications   (3) This referral request   (4) Any documents/labs given to you for this referral                  Follow-up notes from your care team     Return in about 1 year (around 9/10/2019) for Physical Exam.      Who to contact     If you have questions or need follow up information about today's clinic visit or your schedule please contact [facility] directly at [phone].  Normal or non-critical  "lab and imaging results will be communicated to you by MyChart, letter or phone within 4 business days after the clinic has received the results. If you do not hear from us within 7 days, please contact the clinic through [employer] or phone. If you have a critical or abnormal lab result, we will notify you by phone as soon as possible.  Submit refill requests through [employer] or call your pharmacy and they will forward the refill request to us. Please allow 3 business days for your refill to be completed.          Additional Information About Your Visit        [employer] Information     [employer] gives you secure access to your electronic health record. If you see a primary care provider, you can also send messages to your care team and make appointments. If you have questions, please call your primary care clinic.  If you do not have a primary care provider, please call [phone] and they will assist you.        Care EveryWhere ID     This is your Care EveryWhere ID. This could be used by other organizations to access your [facility] medical records  [id number]        Your Vitals Were     Pulse Temperature Respirations Height Pulse Oximetry BMI (Body Mass Index)    72 96  F (35.6  C) (Tympanic) 16 5' 8.5\" (1.74 m) 97% 26.37 kg/m2       Blood Pressure from Last 3 Encounters:   09/10/18 124/72   11/20/17 131/86   10/12/17 114/80    Weight from Last 3 Encounters:   09/10/18 176 lb (79.8 kg)   10/12/17 175 lb (79.4 kg)   06/01/17 175 lb 8 oz (79.6 kg)              We Performed the Following     Basic metabolic panel     FLU VACCINE, SPLIT VIRUS, IM (QUADRIVALENT) [09004]- >3 YRS     GASTROENTEROLOGY ADULT REF PROCEDURE ONLY Dahlia Mcmullen [phone]; No Provider Preference     Lipid panel reflex to direct LDL Fasting     Prostate spec antigen screen     Uric acid     Vaccine Administration, Initial [80518]     Vitamin D Deficiency          Today's Medication Changes          These changes are accurate as of 9/10/18 "  9:49 AM.  If you have any questions, ask your nurse or doctor.               Start taking these medicines.        Dose/Directions    sildenafil 100 MG tablet   Commonly known as:  VIAGRA   Used for:  Sexual dysfunction   Started by:  Stu Delacruz MD        Dose:  100 mg   Take 1 tablet (100 mg) by mouth daily as needed 30 min to 4 hrs before sex. Do not use with nitroglycerin, terazosin or doxazosin.   Quantity:  12 tablet   Refills:  11            Where to get your medicines      These medications were sent to [facility]/pharmacy #[id number] [place], [place] - [address], [place] [address]     Phone:  [phone]     allopurinol 100 MG tablet         Some of these will need a paper prescription and others can be bought over the counter.  Ask your nurse if you have questions.     Bring a paper prescription for each of these medications     sildenafil 100 MG tablet                Primary Care Provider Office Phone # Fax #    Stu Delacruz [phone] [address] [facility] [address]        Equal Access to Services     [facility] AH: Hadii ailin ku hadasho Soomaali, waaxda luqadaha, qaybta kaalmada adeegyada, waxhaley babcock. So [facility] [phone].    ATENCIÓN: Si habla español, tiene a echeverria disposición servicios gratuitos de asistencia lingüística. Llame al [phone].    We comply with applicable federal civil rights laws and [place] laws. We do not discriminate on the basis of race, color, national origin, age, disability, sex, sexual orientation, or gender identity.            Thank you!     Thank you for choosing [facility]  for your care. Our goal is always to provide you with excellent care. Hearing back from our patients is one way we can continue to improve our services. Please take a few minutes to complete the written survey that you may receive in the mail after your  visit with us. Thank you!             Your Updated Medication List - Protect others around you: Learn how to safely use, store and throw away your medicines at www.disposemymeds.org.          This list is accurate as of 9/10/18  9:49 AM.  Always use your most recent med list.                   Brand Name Dispense Instructions for use Diagnosis    allopurinol 100 MG tablet    ZYLOPRIM    30 tablet    TAKE 1 TABLET (100 MG) BY MOUTH DAILY. *APPT NEEDED FOR REFILLS*    Idiopathic chronic gout without tophus, unspecified site       colchicine 0.6 MG tablet    COLCRYS    90 tablet    Take 1 tablet (0.6 mg) by mouth daily    Gouty arthritis of toe of right foot       IBU PO      Take  by mouth.        indomethacin 25 MG capsule    INDOCIN    42 capsule    Take 1 capsule (25 mg) by mouth 2 times daily (with meals)    Gouty arthritis of toe of right foot       LORazepam 1 MG tablet    ATIVAN    30 tablet    TAKE 1 TABLET BY MOUTH EVERY 6 HOURS AS NEEDED    Anxiety       MULTI FOR HIM 50+ Tabs     30 tablet    Take 1 tablet by mouth daily    Hyperlipidemia LDL goal <100       Plant Sterols and Stanols 450 MG Tabs     12 tablet    Take 2 tablets by mouth 2 times daily    Hyperlipidemia LDL goal <100       Red Yeast Rice Extract 600 MG Caps    [employer] RED YEAST RICE    120 capsule    Take 2 capsules by mouth 2 times daily (with meals)    Hyperlipidemia LDL goal <100       sildenafil 100 MG tablet    VIAGRA    12 tablet    Take 1 tablet (100 mg) by mouth daily as needed 30 min to 4 hrs before sex. Do not use with nitroglycerin, terazosin or doxazosin.    Sexual dysfunction       Vitamin D (Cholecalciferol) 1000 units Caps     30 capsule    Take 1 tablet by mouth daily    Vitamin D insufficiency       zolpidem 10 MG tablet    AMBIEN    30 tablet    TAKE 1 TABLET BY MOUTH AT BEDTIME AS NEEDED    Transient insomnia, Anxiety

## 2018-09-10 NOTE — TELEPHONE ENCOUNTER
Central Prior Authorization Team   Phone: [phone]      PA Initiation    Medication: sildenafil (VIAGRA) 100 MG tablet  Insurance Company: [employer] - Phone [phone] Fax [phone]  Pharmacy Filling the Rx: [id number] [place], [place] - [address]  Filling Pharmacy Phone: [phone]  Filling Pharmacy Fax:    Start Date: 9/10/2018

## 2018-09-10 NOTE — PATIENT INSTRUCTIONS
(Z00.00) Routine general medical examination at a health care facility  (primary encounter diagnosis)  Comment:    Plan: Basic metabolic panel             (Z23) Need for vaccination  Comment:    Plan:      (Z23) Need for prophylactic vaccination and inoculation against influenza  Comment:    Plan: FLU VACCINE, SPLIT VIRUS, IM (QUADRIVALENT)         [35618]- >3 YRS, Vaccine Administration,         Initial [01747]             (E78.5) Hyperlipidemia LDL goal <100  Comment:    Plan: Lipid panel reflex to direct LDL Fasting             (H81.12) BPV (benign positional vertigo), left  Comment:    Plan:      (H81.11) BPV (benign positional vertigo), right  Comment:    Plan:      (F41.9) Anxiety  Comment:    Plan:      (F51.01) Primary insomnia  Comment:    Plan:      (E55.9) Vitamin D insufficiency  Comment:    Plan: Vitamin D Deficiency             (M51.36) DDD (degenerative disc disease), lumbar  Comment:    Plan:      (M1A.9XX0) Chronic gout involving toe of left foot without tophus, unspecified cause  Comment:    Plan: Uric acid             (Z12.5) Special screening for malignant neoplasm of prostate  Comment:    Plan: Prostate spec antigen screen             (Z12.11) Special screening for malignant neoplasms, colon  Comment:    Plan: GASTROENTEROLOGY ADULT REF PROCEDURE ONLY         Dahlia Mcmullen [phone]; No Provider        Preference

## 2018-09-11 LAB
ANION GAP SERPL CALCULATED.3IONS-SCNC: 7 MMOL/L (ref 3–14)
BUN SERPL-MCNC: 17 MG/DL (ref 7–30)
CALCIUM SERPL-MCNC: 8.6 MG/DL (ref 8.5–10.1)
CHLORIDE SERPL-SCNC: 107 MMOL/L (ref 94–109)
CHOLEST SERPL-MCNC: 186 MG/DL
CO2 SERPL-SCNC: 27 MMOL/L (ref 20–32)
CREAT SERPL-MCNC: 1.19 MG/DL (ref 0.66–1.25)
DEPRECATED CALCIDIOL+CALCIFEROL SERPL-MC: 52 UG/L (ref 20–75)
GFR SERPL CREATININE-BSD FRML MDRD: 62 ML/MIN/1.7M2
GLUCOSE SERPL-MCNC: 93 MG/DL (ref 70–99)
HDLC SERPL-MCNC: 45 MG/DL
LDLC SERPL CALC-MCNC: 113 MG/DL
NONHDLC SERPL-MCNC: 141 MG/DL
POTASSIUM SERPL-SCNC: 4.5 MMOL/L (ref 3.4–5.3)
PSA SERPL-ACNC: 0.02 UG/L (ref 0–4)
SODIUM SERPL-SCNC: 141 MMOL/L (ref 133–144)
TRIGL SERPL-MCNC: 140 MG/DL
URATE SERPL-MCNC: 5.8 MG/DL (ref 3.5–7.2)

## 2018-09-11 NOTE — TELEPHONE ENCOUNTER
If you would like to appeal denial, please write a letter of necessity and route this entire encounter to [facility] PA MED pool for them to complete appeal. If you would like to change medication or have pt pay out of pocket, please send to the care team so they can call and can notify pt.

## 2018-09-11 NOTE — TELEPHONE ENCOUNTER
PRIOR AUTHORIZATION DENIED    Medication: sildenafil (VIAGRA) 100 MG tablet    Denial Date: 9/11/2018    Denial Rational:      Appeal Information:    If you would like to appeal, please supply P/A team with a letter of medical necessity with clinical reason.

## 2018-09-12 NOTE — TELEPHONE ENCOUNTER
Pt would like to have PA appeal. [facility] PCP has to send information pt needs due to medical necessity after his surgery. He can use [facility], but would like to go thru his insurance first. Routing message to provider with request.

## 2018-09-13 NOTE — TELEPHONE ENCOUNTER
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

## 2018-09-13 NOTE — TELEPHONE ENCOUNTER
Call made to the PA team, A letter of medical necessity would be the next step for this patient.     Stating why this medication is the best medication for him, why he cannot take any other medications, etc.     Routing back to provider for letter

## 2018-09-19 NOTE — TELEPHONE ENCOUNTER
Medication Appeal Initiation    We have initiated an appeal for the requested medication:  Medication: sildenafil (VIAGRA) 100 MG tablet  Appeal Start Date:  9/19/2018  Insurance Company: [employer] - Phone [phone] Fax [phone]  Comments:

## 2018-09-23 DIAGNOSIS — F41.9 ANXIETY: ICD-10-CM

## 2018-09-24 RX ORDER — LORAZEPAM 1 MG/1
TABLET ORAL
Qty: 30 TABLET | Refills: 1 | Status: SHIPPED | OUTPATIENT
Start: 2018-09-24 | End: 2018-10-11

## 2018-09-24 NOTE — TELEPHONE ENCOUNTER
Controlled Substance Refill Request for LORazepam (ATIVAN) 1 MG tablet  Problem List Complete:  No     PROVIDER TO CONSIDER COMPLETION OF PROBLEM LIST AND OVERVIEW/CONTROLLED SUBSTANCE AGREEMENT    Last Written Prescription Date:  9/6/2018  Last Fill Quantity: 30 tablet,   # refills: 1    Last Office Visit with [facility] primary care provider: 9/10/2018    Future Office visit:     Controlled substance agreement on file: No.     Processing:  Patient will  in clinic   checked in past 3 months?  Yes 8/6/18 No concerns

## 2018-09-27 NOTE — TELEPHONE ENCOUNTER
MEDICATION APPEAL DENIED    Medication: sildenafil (VIAGRA) 100 MG tablet    Denial Date: 9/27/2018    Denial Rational:      Second Level Appeal Information:      Second level appeals will be managed by the clinic staff and provider. Please contact the [employer] Prior Authorization Team if additional information about the denial is needed.

## 2018-09-29 NOTE — TELEPHONE ENCOUNTER
Called pt and left VM asking him to call me back regarding this medication. I also sent him a [employer] message just in case.

## 2018-10-10 ENCOUNTER — MYC MEDICAL ADVICE (OUTPATIENT)
Dept: FAMILY MEDICINE | Facility: CLINIC | Age: 63
End: 2018-10-10

## 2018-10-10 DIAGNOSIS — F41.9 ANXIETY: ICD-10-CM

## 2018-10-10 DIAGNOSIS — F51.01 PRIMARY INSOMNIA: ICD-10-CM

## 2018-10-11 RX ORDER — LORAZEPAM 1 MG/1
TABLET ORAL
Qty: 30 TABLET | Refills: 1 | Status: SHIPPED | OUTPATIENT
Start: 2018-10-11 | End: 2018-10-19

## 2018-10-11 NOTE — TELEPHONE ENCOUNTER
Controlled Substance Refill Request for LORazepam (ATIVAN) 1 MG tablet  Problem List Complete:  No     PROVIDER TO CONSIDER COMPLETION OF PROBLEM LIST AND OVERVIEW/CONTROLLED SUBSTANCE AGREEMENT  LORazepam (ATIVAN) 1 MG tablet 30 tablet 1 9/24/2018           [facility] Office Visit with [facility] [place] care provider: 09/10/2018    Future Office visit:     Controlled substance agreement on file: No.     Processing:  Fax Rx to [facility] pharmacy   checked in past 3 months?  Yes Last  check - 8/6/18- no concerns

## 2018-10-19 DIAGNOSIS — F41.9 ANXIETY: ICD-10-CM

## 2018-10-19 DIAGNOSIS — F51.01 PRIMARY INSOMNIA: ICD-10-CM

## 2018-10-20 NOTE — TELEPHONE ENCOUNTER
LORazepam (ATIVAN) 1 MG tablet     Last Written Prescription Date:  10/11/2018  Last Fill Quantity: 30,   # refills: 1  Last Office Visit: 09/10/2018  Future Office visit:       Routing refill request to provider for review/approval because:  Drug not on the [facility], [facility] or [facility] refill protocol or controlled substance

## 2018-10-22 RX ORDER — LORAZEPAM 1 MG/1
TABLET ORAL
Qty: 10 TABLET | Refills: 0 | Status: SHIPPED | OUTPATIENT
Start: 2018-10-22 | End: 2018-11-10

## 2018-10-22 NOTE — TELEPHONE ENCOUNTER
This Rx was filled     quite recently.   I will reorder 10 tablets.  Dr. Delacruz will be back on   October 25.                   We need clarification from him regarding how many tablets this patient is allowed per month, etc.                                                                   I approved the Rx. Please phone it in or fax it to the pharmacy and let the patient know.

## 2018-10-22 NOTE — TELEPHONE ENCOUNTER
UPDATE: I resent the 10/11/18 Rx and let the pharmacy know that they should disregard todays rx for 10 tablets.  I left him voicemail and mychart apologizing for confusion.    Dina Keys RN- Triage [employer]            I called his pharmacy and they DID NOT receive the 10/11/18 Rx and  shows he didn't fill it.    I faxed the 10 tablet supply.       I mycharted patient this information.

## 2018-10-22 NOTE — TELEPHONE ENCOUNTER
Controlled Substance Refill Request for   LORazepam (ATIVAN) 1 MG tablet 30 tablet        Problem List Complete:  No     PROVIDER TO CONSIDER COMPLETION OF PROBLEM LIST AND OVERVIEW/CONTROLLED SUBSTANCE AGREEMENT      Controlled substance agreement on file: No.     Processing:  Fax Rx to [facility] pharmacy   checked in past 3 months?  Yes Last  check - 8/6/18- no concerns

## 2018-11-10 DIAGNOSIS — F41.9 ANXIETY: ICD-10-CM

## 2018-11-10 DIAGNOSIS — F51.02 TRANSIENT INSOMNIA: ICD-10-CM

## 2018-11-10 DIAGNOSIS — F51.01 PRIMARY INSOMNIA: ICD-10-CM

## 2018-11-10 NOTE — TELEPHONE ENCOUNTER
Requested Prescriptions   Pending Prescriptions Disp Refills     zolpidem (AMBIEN) 10 MG tablet [Pharmacy Med Name: ZOLPIDEM TARTRATE 10 MG TABLET]      Last Written Prescription Date:  09/10/2018  Last Fill Quantity: 30,   # refills: 1  Last Office Visit: 09/10/2018  Future Office visit:       Routing refill request to provider for review/approval because:  Drug not on the [facility], [facility] or  Health refill protocol or controlled substance   30 tablet 1     Sig: TAKE 1 TABLET BY MOUTH AT BEDTIME AS NEEDED    There is no refill protocol information for this order

## 2018-11-10 NOTE — TELEPHONE ENCOUNTER
Requested Prescriptions   Pending Prescriptions Disp Refills     LORazepam (ATIVAN) 1 MG tablet [Pharmacy Med Name: LORAZEPAM 1 MG TABLET]   Last Written Prescription Date:  10/22/2018  Last Fill Quantity: 10,   # refills: 0  Last Office Visit: 09/10/2018  Future Office visit:       Routing refill request to provider for review/approval because:  Drug not on the [facility], [facility] or [facility] refill protocol or controlled substance   30 tablet      Sig: TAKE 1 TABLET BY MOUTH EVERY 6 HOURS AS NEEDED    There is no refill protocol information for this order

## 2018-11-12 RX ORDER — ZOLPIDEM TARTRATE 10 MG/1
TABLET ORAL
Qty: 30 TABLET | Refills: 1 | Status: SHIPPED | OUTPATIENT
Start: 2018-11-12 | End: 2018-12-14

## 2018-11-12 RX ORDER — LORAZEPAM 1 MG/1
TABLET ORAL
Qty: 30 TABLET | Refills: 2 | Status: SHIPPED | OUTPATIENT
Start: 2018-11-12 | End: 2018-11-30

## 2018-11-12 NOTE — TELEPHONE ENCOUNTER
Controlled Substance Refill Request for zolpidem (AMBIEN) 10 MG tablet  Problem List Complete:  Yes  Problem list name updated by automated process. Provider to review    No CSA on file  Last  check - 11/12//18- no concerns   checked in past 3 months?  Yes 11/12/2018- no concerns

## 2018-11-12 NOTE — TELEPHONE ENCOUNTER
Controlled Substance Refill Request for LORazepam (ATIVAN) 1 MG tablet  Problem List Complete:  Yes  Problem list name updated by automated process. Provider to review    No CSA on file  Last  check - 11/12//18- no concerns   checked in past 3 months?  Yes 11/12/2018

## 2018-11-30 DIAGNOSIS — F41.9 ANXIETY: ICD-10-CM

## 2018-11-30 RX ORDER — LORAZEPAM 1 MG/1
TABLET ORAL
Qty: 30 TABLET | Refills: 2 | Status: SHIPPED | OUTPATIENT
Start: 2018-11-30 | End: 2018-12-14

## 2018-11-30 NOTE — TELEPHONE ENCOUNTER
LORazepam (ATIVAN) 1 MG tablet   This may not be due for a refill.    Last Written Prescription Date:  11/12/2018  Last Fill Quantity: 30 tablet,  # refills: 2   Last office visit: 9/10/2018 with prescribing provider:  LAKESHA Delacruz   Future Office Visit:           Routing refill request to provider for review/approval because:  Drug not on the [facility], P or [facility] refill protocol or controlled substance  No CSA on file    Last  check 8/6/18 No concerns

## 2018-12-08 ENCOUNTER — MYC REFILL (OUTPATIENT)
Dept: FAMILY MEDICINE | Facility: CLINIC | Age: 63
End: 2018-12-08

## 2018-12-08 DIAGNOSIS — M1A.00X0 IDIOPATHIC CHRONIC GOUT WITHOUT TOPHUS, UNSPECIFIED SITE: ICD-10-CM

## 2018-12-08 DIAGNOSIS — F51.02 TRANSIENT INSOMNIA: ICD-10-CM

## 2018-12-08 DIAGNOSIS — F41.9 ANXIETY: ICD-10-CM

## 2018-12-10 ENCOUNTER — MYC MEDICAL ADVICE (OUTPATIENT)
Dept: FAMILY MEDICINE | Facility: CLINIC | Age: 63
End: 2018-12-10

## 2018-12-10 DIAGNOSIS — F41.9 ANXIETY: ICD-10-CM

## 2018-12-10 DIAGNOSIS — F51.02 TRANSIENT INSOMNIA: ICD-10-CM

## 2018-12-10 DIAGNOSIS — M1A.00X0 IDIOPATHIC CHRONIC GOUT WITHOUT TOPHUS, UNSPECIFIED SITE: ICD-10-CM

## 2018-12-10 RX ORDER — ZOLPIDEM TARTRATE 10 MG/1
TABLET ORAL
Qty: 30 TABLET | Refills: 1 | OUTPATIENT
Start: 2018-12-10

## 2018-12-10 RX ORDER — LORAZEPAM 1 MG/1
1 TABLET ORAL EVERY 6 HOURS
Qty: 30 TABLET | Refills: 2 | OUTPATIENT
Start: 2018-12-10

## 2018-12-10 RX ORDER — ALLOPURINOL 100 MG/1
TABLET ORAL
Qty: 30 TABLET | Refills: 11 | OUTPATIENT
Start: 2018-12-10

## 2018-12-10 NOTE — TELEPHONE ENCOUNTER
"Requested Prescriptions   Pending Prescriptions Disp Refills     allopurinol (ZYLOPRIM) 100 MG tablet  Last Written Prescription Date:  9/10/2018  Last Fill Quantity: 30 tablet,  # refills: 11   Last office visit: 9/10/2018 with prescribing provider:  LAKESHA Delacruz   Future Office Visit:     30 tablet 11     Sig: TAKE 1 TABLET (100 MG) BY MOUTH DAILY. *APPT NEEDED FOR REFILLS*    Gout Agents Protocol Failed - 12/10/2018  7:17 AM       Failed - CBC on file in past 12 months    Recent Labs   Lab Test 10/26/15  0839   WBC 5.8   RBC 5.25   HGB 15.7   HCT 44.7                   Failed - ALT on file in past 12 months    Recent Labs   Lab Test 10/10/16  1113   ALT 20            Passed - Has Uric Acid on file in past 12 months and value is less than 6    Recent Labs   Lab Test 09/10/18  0946   URIC 5.8     If level is 6mg/dL or greater, ok to refill one time and refer to provider.          Passed - Recent (12 mo) or future (30 days) visit within the authorizing provider's specialty    Patient had office visit in the last 12 months or has a visit in the next 30 days with authorizing provider or within the authorizing provider's specialty.  See \"Patient Info\" tab in inbasket, or \"Choose Columns\" in Meds & Orders section of the refill encounter.             Passed - Patient is age 18 or older       Passed - Normal serum creatinine on file in the past 12 months    Recent Labs   Lab Test 09/10/18  0946   CR 1.19             Controlled Substance Refill Request for zolpidem (AMBIEN) 10 MG tablet  Problem List Complete:  Yes  Problem list name updated by automated process. Provider to review     No CSA on file  Last  check - 11/12//18- no concerns    Last Written Prescription Date:  11/12/2018  Last Fill Quantity: 30 tablet,   # refills: 1    Future Office visit:     Controlled substance agreement on file: No.     Processing:  Fax Rx to [id number] pharmacy   checked in past 3 months?  Yes 11/12/2018            "     LORazepam (ATIVAN) 1 MG tablet     Last Written Prescription Date:  11/30/2018  Last Fill Quantity: 30 tablet,  # refills: 2   Last office visit: 9/10/2018 with prescribing provider: LAKESHA Delacruz  Future Office Visit:          Routing refill request to provider for review/approval because:  Drug not on the [facility], [facility] or [facility] refill protocol or controlled substance

## 2018-12-10 NOTE — TELEPHONE ENCOUNTER
Denied. Advise to call   [phone]   Pharmacy Address and Hours     Address Hours   [address] [place] [address]      And transfer the prescriptions, there are active prescriptions on file there.

## 2018-12-11 ENCOUNTER — TELEPHONE (OUTPATIENT)
Dept: FAMILY MEDICINE | Facility: CLINIC | Age: 63
End: 2018-12-11

## 2018-12-11 DIAGNOSIS — M1A.00X0 IDIOPATHIC CHRONIC GOUT WITHOUT TOPHUS, UNSPECIFIED SITE: ICD-10-CM

## 2018-12-11 DIAGNOSIS — F41.9 ANXIETY: ICD-10-CM

## 2018-12-11 DIAGNOSIS — F51.02 TRANSIENT INSOMNIA: ICD-10-CM

## 2018-12-11 RX ORDER — LORAZEPAM 1 MG/1
1 TABLET ORAL EVERY 6 HOURS
Qty: 30 TABLET | Refills: 2 | Status: CANCELLED | OUTPATIENT
Start: 2018-12-11

## 2018-12-11 RX ORDER — ZOLPIDEM TARTRATE 10 MG/1
TABLET ORAL
Qty: 30 TABLET | Refills: 1 | Status: CANCELLED | OUTPATIENT
Start: 2018-12-11

## 2018-12-11 RX ORDER — ALLOPURINOL 100 MG/1
TABLET ORAL
Qty: 30 TABLET | Refills: 11 | Status: CANCELLED | OUTPATIENT
Start: 2018-12-11

## 2018-12-11 NOTE — TELEPHONE ENCOUNTER
Pharmacy fax # [phone]    Controlled Substance Refill Request for Loraxepam & Zolpidem  Problem List Complete:  No   Noted:  6/14/2016    Priority:  Medium    Overview Addendum 8/22/2018  1:10 PM by Duglas White, RN   No CSA on file     Last  check 12/11/2018 No concerns       PROVIDER TO CONSIDER COMPLETION OF PROBLEM LIST AND OVERVIEW/CONTROLLED SUBSTANCE AGREEMENT    Last Written Prescription Date:  Lorazepam 11/30 & Zoppiem 11/12  Last Fill Quantity: 30,   # refills:2  Lorazepam                                30                 1  zopidem  Last Office Visit with [facility] primary care provider: 9/10/2018    Future Office visit:     Controlled substance agreement on file: No.     Processing:  Fax Rx to [phone] pharmacy   checked in past 3 months?  Yes 12/11/2018       On the allopurinal  It states he must have an Office Visit  Before more refills but he is in [place]

## 2018-12-14 RX ORDER — LORAZEPAM 1 MG/1
1 TABLET ORAL EVERY 6 HOURS
Qty: 30 TABLET | Refills: 0 | Status: SHIPPED | OUTPATIENT
Start: 2018-12-14 | End: 2018-12-20

## 2018-12-14 RX ORDER — ALLOPURINOL 100 MG/1
TABLET ORAL
Qty: 30 TABLET | Refills: 0 | Status: SHIPPED | OUTPATIENT
Start: 2018-12-14 | End: 2019-02-05

## 2018-12-14 RX ORDER — ZOLPIDEM TARTRATE 10 MG/1
10 TABLET ORAL
Qty: 30 TABLET | Refills: 0 | Status: SHIPPED | OUTPATIENT
Start: 2018-12-14 | End: 2019-02-05

## 2018-12-14 NOTE — TELEPHONE ENCOUNTER
(F51.02) Transient insomnia  Comment:    Plan: zolpidem (AMBIEN) 10 MG tablet             (F41.9) Anxiety  Comment:    Plan: zolpidem (AMBIEN) 10 MG tablet, LORazepam         (ATIVAN) 1 MG tablet             (M1A.00X0) Idiopathic chronic gout without tophus, unspecified site  Comment:    Plan: allopurinol (ZYLOPRIM) 100 MG tablet             FAX AND SENT TO PHARMACY PLEASE   TARSHA LUU JR., MD

## 2018-12-14 NOTE — TELEPHONE ENCOUNTER
Zolpidem and ativan have been faxed to [facility] in [place] [phone]  Patient has been called

## 2018-12-20 DIAGNOSIS — F41.9 ANXIETY: ICD-10-CM

## 2018-12-21 NOTE — TELEPHONE ENCOUNTER
LORazepam (ATIVAN) 1 MG tablet   Last Written Prescription Date:  12/14/2018  Last Fill Quantity: 30 tablet,  # refills: 0   Last office visit: 9/10/2018 with prescribing provider:  LAKESHA Delacruz   Future Office Visit:        Routing refill request to provider for review/approval because:  Drug not on the [facility], P or [facility] refill protocol or controlled substance

## 2018-12-24 RX ORDER — LORAZEPAM 1 MG/1
TABLET ORAL
Qty: 30 TABLET | Refills: 0 | Status: SHIPPED | OUTPATIENT
Start: 2018-12-24 | End: 2019-01-03

## 2018-12-24 NOTE — TELEPHONE ENCOUNTER
Routing refill request to provider for review/approval because:  Last Rx was written on 12/14/18, has not been filled per

## 2019-01-03 DIAGNOSIS — F41.9 ANXIETY: ICD-10-CM

## 2019-01-04 NOTE — TELEPHONE ENCOUNTER
LORAZEPAM 1 MG TABLET  Last Written Prescription Date:  12/24/18  Last Fill Quantity: 30,  # refills: 0   Last office visit: 9/10/2018 with prescribing provider:  09/10/18   Future Office Visit:    Requested Prescriptions   Pending Prescriptions Disp Refills     LORazepam (ATIVAN) 1 MG tablet [Pharmacy Med Name: LORAZEPAM 1 MG TABLET] 30 tablet 0     Sig: TAKE 1 TABLET BY MOUTH EVERY 6 HOURS    There is no refill protocol information for this order

## 2019-01-07 NOTE — TELEPHONE ENCOUNTER
[place] reviewed:  reviewed- 1/7/19  no concerns    [place]:  [email or web address]  Routing refill request to provider for review/approval because:  Drug not on the [facility] refill protocol

## 2019-01-08 RX ORDER — LORAZEPAM 1 MG/1
TABLET ORAL
Qty: 30 TABLET | Refills: 0 | Status: SHIPPED | OUTPATIENT
Start: 2019-01-08 | End: 2019-01-15

## 2019-01-15 DIAGNOSIS — F41.9 ANXIETY: ICD-10-CM

## 2019-01-16 NOTE — TELEPHONE ENCOUNTER
Controlled Substance Refill Request for LORazepam (ATIVAN) 1 MG tablet  Problem List Complete:  Yes  No CSA on file     Last  check 8/6/18 No concerns    Last Written Prescription Date:  1/8/2019  Last Fill Quantity: 30 tablet,   # refills: 0    THE MOST RECENT OFFICE VISIT MUST BE WITHIN THE PAST 3 MONTHS. (AT LEAST ONE FACE TO FACE VISIT MUST OCCUR EVERY 6 MONTHS. ADDITIONAL VISITS CAN BE VIRTUAL.)    Controlled substance agreement: [unfilled]    Last Urine Drug Screen: No results found for: CDAUT, No results found for: COMDAT, No results found for: THC13, PCP13, COC13, MAMP13, OPI13, AMP13, BZO13, TCA13, MTD13, BAR13, OXY13, PPX13, BUP13     Processing:  Fax Rx to [facility]/pharmacy #[id number] - [place], [place] - [address] pharmacy    [email or web address]   checked in past 3 months?  No, route to RN

## 2019-01-17 RX ORDER — LORAZEPAM 1 MG/1
TABLET ORAL
Qty: 30 TABLET | Refills: 0 | Status: SHIPPED | OUTPATIENT
Start: 2019-01-17 | End: 2019-01-23

## 2019-01-17 NOTE — TELEPHONE ENCOUNTER
[place] reviewed:  reviewed 1/17/19- no concerns    [place]:  [email or web address]      Routing refill request to provider for review/approval because:  Drug not on the [facility] refill protocol

## 2019-01-22 ENCOUNTER — HOSPITAL ENCOUNTER (OUTPATIENT)
Facility: CLINIC | Age: 64
Discharge: HOME | End: 2019-01-22
Attending: INTERNAL MEDICINE | Admitting: INTERNAL MEDICINE
Payer: COMMERCIAL

## 2019-01-22 VITALS
BODY MASS INDEX: 24.34 KG/M2 | DIASTOLIC BLOOD PRESSURE: 78 MMHG | RESPIRATION RATE: 16 BRPM | OXYGEN SATURATION: 94 % | WEIGHT: 170 LBS | SYSTOLIC BLOOD PRESSURE: 107 MMHG | HEIGHT: 70 IN | HEART RATE: 56 BPM

## 2019-01-22 PROCEDURE — G0121 COLON CA SCRN NOT HI RSK IND: HCPCS | Performed by: INTERNAL MEDICINE

## 2019-01-22 PROCEDURE — 45378 DIAGNOSTIC COLONOSCOPY: CPT | Performed by: INTERNAL MEDICINE

## 2019-01-22 PROCEDURE — G0500 MOD SEDAT ENDO SERVICE >5YRS: HCPCS | Performed by: INTERNAL MEDICINE

## 2019-01-22 PROCEDURE — 25000128 H RX IP 250 OP 636: Performed by: INTERNAL MEDICINE

## 2019-01-22 RX ORDER — ONDANSETRON 4 MG/1
4 TABLET, ORALLY DISINTEGRATING ORAL EVERY 6 HOURS PRN
Status: DISCONTINUED | OUTPATIENT
Start: 2019-01-22 | End: 2019-01-22 | Stop reason: HOSPADM

## 2019-01-22 RX ORDER — NALOXONE HYDROCHLORIDE 0.4 MG/ML
INJECTION, SOLUTION INTRAMUSCULAR; INTRAVENOUS; SUBCUTANEOUS PRN
Status: DISCONTINUED | OUTPATIENT
Start: 2019-01-22 | End: 2019-01-22 | Stop reason: HOSPADM

## 2019-01-22 RX ORDER — ONDANSETRON 2 MG/ML
4 INJECTION INTRAMUSCULAR; INTRAVENOUS
Status: DISCONTINUED | OUTPATIENT
Start: 2019-01-22 | End: 2019-01-22 | Stop reason: HOSPADM

## 2019-01-22 RX ORDER — LIDOCAINE 40 MG/G
CREAM TOPICAL
Status: DISCONTINUED | OUTPATIENT
Start: 2019-01-22 | End: 2019-01-22 | Stop reason: HOSPADM

## 2019-01-22 RX ORDER — FENTANYL CITRATE 50 UG/ML
INJECTION, SOLUTION INTRAMUSCULAR; INTRAVENOUS PRN
Status: DISCONTINUED | OUTPATIENT
Start: 2019-01-22 | End: 2019-01-22 | Stop reason: HOSPADM

## 2019-01-22 RX ORDER — NALOXONE HYDROCHLORIDE 0.4 MG/ML
.1-.4 INJECTION, SOLUTION INTRAMUSCULAR; INTRAVENOUS; SUBCUTANEOUS
Status: DISCONTINUED | OUTPATIENT
Start: 2019-01-22 | End: 2019-01-22 | Stop reason: HOSPADM

## 2019-01-22 RX ORDER — FLUMAZENIL 0.1 MG/ML
0.2 INJECTION, SOLUTION INTRAVENOUS
Status: DISCONTINUED | OUTPATIENT
Start: 2019-01-22 | End: 2019-01-22 | Stop reason: HOSPADM

## 2019-01-22 RX ORDER — ONDANSETRON 2 MG/ML
4 INJECTION INTRAMUSCULAR; INTRAVENOUS EVERY 6 HOURS PRN
Status: DISCONTINUED | OUTPATIENT
Start: 2019-01-22 | End: 2019-01-22 | Stop reason: HOSPADM

## 2019-01-22 ASSESSMENT — MIFFLIN-ST. JEOR: SCORE: 1559.42

## 2019-01-22 NOTE — LETTER
January 25, 2019      Sergio Estevez  [address]  [place] [address]        Dear ,    We are writing to inform you of your test results.    DIVERTICULOSIS CAREFUL WITH NUTS   NO POLYPS   NORMAL RECTAL   CONGRATULATIONS   REPEAT TEN YEARS       Resulted Orders   COLONOSCOPY   Result Value Ref Range    COLONOSCOPY       [facility]  _______________________________________________________________________________  Patient Name: Sergio Estevez       Procedure Date: 1/22/2019 8:48 AM  MRN: [id number]                       Account Number: [id number]  YOB: 1955               Admit Type: Outpatient  Age: 63                               Gender: Male  Attending MD: Tal Chang MD   Total Sedation Time: 22_minutes continuous   bedside 1:1  Instrument Name: 213 - Pediatric Colonoscope   _______________________________________________________________________________     Procedure:                Colonoscopy  Indications:              Screening for colorectal malignant neoplasm  Providers:                Tal Chang MD (Doctor)  Referring MD:             Stu Delacruz MD (Referring MD)  Medicines:                Midazolam 4 mg IV, Fentanyl 200 micrograms IV  Complications:            No immediate complications.  __________________________________________________ _____________________________  Procedure:                Pre-Anesthesia Assessment:                            - Prior to the procedure, a History and Physical                             was performed, and patient medications and                             allergies were reviewed. The patient is competent.                             The risks and benefits of the procedure and the                             sedation options and risks were discussed with the                             patient. All questions were answered and informed                             consent was obtained.  Patient identification and                             proposed procedure were verified in the procedure                             room. Mental Status Examination: alert and                             oriented. Airway Examination: normal oropharyngeal                             airway and neck mobility. Respiratory Examination:                             clear to auscultation. CV Examination: normal .                             Prophylactic Antibiotics: The patient does not                             require prophylactic antibiotics. Prior                             Anticoagulants: The patient has taken no previous                             anticoagulant or antiplatelet agents. ASA Grade                             Assessment: II - A patient with mild systemic                             disease. After reviewing the risks and benefits,                             the patient was deemed in satisfactory condition to                             undergo the procedure. The anesthesia plan was to                             use moderate sedation / analgesia (conscious                             sedation). Immediately prior to administration of                             medications, the patient was re-assessed for                             adequacy to receive sedatives. The heart rate,                             respiratory rate, oxygen saturations, blood                              pressure, adequacy of pulmonary ventilation, and                             response to care were monitored throughout the                             procedure. The physical status of the patient was                             re-assessed after the procedure.                            After obtaining informed consent, the colonoscope                             was passed under direct vision. Throughout the                             procedure, the patient's blood pressure, pulse, and                             oxygen  saturations were monitored continuously. The                             Olympus Pediatric Colonoscope, Model # PCF-H190DL,                             [place] # 213, SN # [id number] was introduced through                             the anus and advanced to the cecum, identified by                             appendiceal orifice and ileocecal valve. The                             colonoscopy was performed without difficulty. The                             anil ent tolerated the procedure well. The quality                             of the bowel preparation was good.                                                                                   Findings:       The perianal and digital rectal examinations were normal.       Multiple small and large-mouthed diverticula were found in the sigmoid        colon.       The exam was otherwise without abnormality on direct and retroflexion        views.                                                                                   Impression:               - Diverticulosis in the sigmoid colon.                            - The examination was otherwise normal on direct                             and retroflexion views.                            - No specimens collected.  Recommendation:           - Repeat colonoscopy in 10 years for screening                             purposes.                                                                                   Procedure Code(s):       --- Pro fessional ---       , Colorectal cancer screening; colonoscopy on individual not        meeting criteria for high risk  Diagnosis Code(s):       --- Professional ---       Z12.11, Encounter for screening for malignant neoplasm of colon    CPT copyright 2017 American Medical Association. All rights reserved.    The codes documented in this report are preliminary and upon  review may   be revised to meet current compliance requirements.    Electronically signed by Tal  Bernardo WELSH  ____________________  Tal Chang MD  1/22/2019 9:48:15 AM  I was physically present for the entire viewing portion of the exam.  __________________________Tal Chang MD  Number of Addenda: 0    Note Initiated On: 1/22/2019 8:48 AM  MRN:                      [id number]  Procedure Date:           1/22/2019 8:48:32 AM  Scope Withdrawal Time: 0 hours 6 minutes 9 seconds   Total Procedure Duration: 0 hours 20 minutes 13 seconds   Estimated Blood Loss:       Scope In: 9:18:51 AM  Scope Out: 9 :39:04 AM         If you have any questions or concerns, please call the clinic at the number listed above.       Sincerely,        TARSHA LUU JR., MD

## 2019-01-22 NOTE — LETTER
January 8, 2019      Sergio Estevez  [address]  [facility] [address]      Thank you for choosing [facility]. You are scheduled for the following service.     Date:  1/22/2019 Tuesday             Procedure:  COLONOSCOPY  Doctor:        Dr. Tal Chang   Arrival Time:  8:30 AM  *Check in at Emergency/Endoscopy desk*  Procedure Time:  9:00 AM    Location:   [facility]        Endoscopy Department, First Floor (Enter through ER Doors) *        [address] [place], [place] [address]      [phone] or [phone] () to reschedule      MIRALAX -GATORADE  PREP  Colonoscopy is the most accurate test to detect colon polyps and colon cancer; and the only test where polyps can be removed. During this procedure, a doctor examines the lining of your large intestine and rectum through a flexible tube.       Transportation  Arrange for a ride for the day of your procedure with a responsible adult.  A taxi ride is not an option unless you are accompanied by a responsible adult. If you fail to arrange transportation with a responsible adult, your procedure will be cancelled and rescheduled.    Purchase the  following supplies at your local pharmacy:  - 2 (two) bisacodyl tablets: each tablet contains 5 mg.  (Dulcolax  laxative NOT Dulcolax  stool softener)   - 1 (one) 8.3 oz bottle of Polyethylene Glycol (PEG) 3350 Powder   (MiraLAX , Smooth LAX , ClearLAX  or equivalent)  - 64 oz Gatorade    Regular Gatorade, Gatorade G2 , Powerade , Powerade Zero  or Pedialyte  is acceptable. Red colored flavors are not allowed; all other colors (yellow, green, orange, purple and blue) are okay. It is also okay to buy two 2.12 oz packets of powdered Gatorade that can be mixed with water to a total volume of 64 oz of liquid.  - 1 (one) 10 oz bottle of Magnesium Citrate (Red colored flavors are not allowed)  It is also okay for you to use a 0.5 oz package of  powdered magnesium citrate (17 g) mixed with 10 oz of water.    PREPARATION FOR COLONOSCOPY    7 days before:    Discontinue fiber supplements and medications containing iron. This includes Metamucil  and Fibercon ; and multivitamins with iron.  3 days before:    Begin a low-fiber diet. A low-fiber diet helps making the cleanout more effective.     Examples of a low-fiber diet include (but are not limited to): white bread, white rice, pasta, crackers, fish, chicken, eggs, ground beef, creamy peanut butter, cooked/steamed/boiled vegetables, canned fruit, bananas, melons, milk, plain yogurt cheese, salad dressing and other condiments.     The following are not allowed on a low-fiber diet: seeds, nuts, popcorn, bran, whole wheat, corn, quinoa, raw fruits and vegetables, berries and dried fruit, beans and lentils.    For additional details on low-fiber diet, please refer to the table on the last page.  2 days before:    Continue the low-fiber diet.     Drink at least 8 glasses of water throughout the day.     Stop eating solid foods at 11:45 pm.  1 day before:    In the morning: begin a clear liquid diet (liquids you can see through).     Examples of a clear liquid diet include: water, clear broth or bouillon, Gatorade, Pedialyte or Powerade, carbonated and non-carbonated soft drinks (Sprite , 7-Up , ginger ale), strained fruit juices without pulp (apple, white grape, white cranberry), Jell-O  and popsicles.     The following are not allowed on a clear liquid diet: red liquids, alcoholic beverages, dairy products (milk, creamer, and yogurt), protein shakes, creamy broths, juice with pulp and chewing tobacco.    At noon: take 2 (two) bisacodyl tablets     At 4 (and no later than 6pm): start drinking the Miralax-Gatorade preparation (8.3 oz of Miralax mixed with 64 oz of Gatorade in a large pitcher). Drink 1(one) 8 oz glass every 15 minutes thereafter, until the mixture is gone.    COLON CLEANSING TIPS: drink adequate  amounts of fluids before and after your colon cleansing to prevent dehydration. Stay near a toilet because you will have diarrhea. Even if you are sitting on the toilet, continue to drink the cleansing solution every 15 minutes. If you feel nauseous or vomit, rinse your mouth with water, take a 15 to 30-minute-break and then continue drinking the solution. You will be uncomfortable until the stool has flushed from your colon (in about 2 to 4 hours). You may feel chilled.        Day of your procedure  You may take all of your morning medications including blood pressure medications, blood thinners (if you have not been instructed to stop these by our office), methadone, anti-seizure medications with sips of water 3 hours prior to your procedure or earlier. Do not take insulin or vitamins prior to your procedure. Continue the clear liquid diet.   4 hours prior: drink 10 oz of magnesium citrate. It may be easier to drink it with a straw.    STOP consuming all liquids after that.     Do not take anything by mouth during this time.     Allow extra time to travel to your procedure as you may need to stop and use a restroom along the way.  You are ready for the procedure, if you followed all instructions and your stool is no longer formed, but clear or yellow liquid. If you are unsure whether your colon is clean, please call our office at [phone] before you leave for your appointment.  Bring the following to your procedure:  - Insurance Card/Photo ID.   - List of current medications including over-the-counter medications and supplements.   - Your rescue inhaler if you currently use one to control asthma.      Canceling or rescheduling your appointment:   If you must cancel or reschedule your appointment, please call [phone] as soon as possible.      COLONOSCOPY PRE-PROCEDURE CHECKLIST  If you have diabetes, ask your regular doctor for diet and medication restrictions.  If you take an anticoagulant or  anti-platelet medication (such as Coumadin , Lovenox , Pradaxa , Xarelto , Eliquis , etc.), please call your primary doctor for advice on holding this medication.  If you take aspirin you may continue to do so.  If you are or may be pregnant, please discuss the risks and benefits of this procedure with your doctor.          What happens during a colonoscopy?    Plan to spend up to two hours, starting at registration time, at the endoscopy center the day of your procedure. The colonoscopy takes an average of 15 to 30 minutes. Recovery time is about 30 minutes.    Before the exam:    You will change into a gown.    Your medical history and medication list will be reviewed with you, unless that has been done over the phone prior to the procedure.     A nurse will insert an intravenous (IV) line into your hand or arm.    The doctor will meet with you and will give you a consent form to sign.    During the exam:     Medicine will be given through the IV line to help you relax.     Your heart rate and oxygen levels will be monitored. If your blood pressure is low, you may be given fluids through the IV line.     The doctor will insert a flexible hollow tube, called a colonoscope, into your rectum. The scope will be advanced slowly through the large intestine (colon).    You may have a feeling of fullness or pressure.     If an abnormal tissue or a polyp is found, the doctor may remove it through the endoscope for closer examination, or biopsy. Tissue removal is painless    After the exam:           Any tissue samples removed during the exam will be sent to a lab for evaluation. It may take 5-7 working days for you to be notified of the results.     A nurse will provide you with complete discharge instructions before you leave the endoscopy center. Be sure to ask the nurse for specific instructions if you take blood thinners such as Aspirin, Coumadin or Plavix.     The doctor will prepare a full report for you and for the  physician who referred you for the procedure.     Your doctor will talk with you about the initial results of your exam.      Medication given during the exam will prohibit you from driving for the rest of the day.     Following the exam, you may resume your normal diet. Your first meal should be light, no greasy foods. Avoid alcohol until the next day.     You may resume your regular activities the day after the procedure.     LOW-FIBER DIET    Foods RECOMMENDED Foods to AVOID   Breads, Cereal, Rice and Pasta:   White bread, rolls, biscuits, croissant and adriana toast.   Waffles, Malay toast and pancakes.   White rice, noodles, pasta, macaroni and peeled cooked potatoes.   Plain crackers and saltines.   Cooked cereals: farina, cream of rice.   Cold cereals: Puffed Rice , Rice Krispies , Corn Flakes  and Special K    Breads, Cereal, Rice and Pasta:   Breads or rolls with nuts, seeds or fruit.   Whole wheat, pumpernickel, rye breads and cornbread.   Potatoes with skin, brown or wild rice, and kasha (buckwheat).     Vegetables:   Tender cooked and canned vegetables without seeds: carrots, asparagus tips, green or wax beans, pumpkin, spinach, lima beans. Vegetables:   Raw or steamed vegetables.   Vegetables with seeds.   Sauerkraut.   Winter squash, peas, broccoli, Brussel sprouts, cabbage, onions, cauliflower, baked beans, peas and corn.   Fruits:   Strained fruit juice.   Canned fruit, except pineapple.   Ripe bananas and melon. Fruits:   Prunes and prune juice.   Raw fruits.   Dried fruits: figs, dates and raisins.   Milk/Dairy:   Milk: plain or flavored.   Yogurt, custard and ice cream.   Cheese and cottage cheese Milk/Dairy:     Meat and other proteins:   ground, well-cooked tender beef, lamb, ham, veal, pork, fish, poultry and organ meats.   Eggs.   Peanut butter without nuts. Meat and other proteins:   Tough, fibrous meats with gristle.   Dry beans, peas and lentils.   Peanut butter with nuts.   Tofu.   Fats,  Snack, Sweets, Condiments and Beverages:   Margarine, butter, oils, mayonnaise, sour cream and salad dressing, plain gravy.   Sugar, hard candy, clear jelly, honey and syrup.   Spices, cooked herbs, bouillon, broth and soups made with allowed vegetable, ketchup and mustard.   Coffee, tea and carbonated drinks.   Plain cakes, cookies and pretzels.   Gelatin, plain puddings, custard, ice cream, sherbet and popsicles. Fats, Snack, Sweets, Condiments and Beverages:   Nuts, seeds and coconut.   Jam, marmalade and preserves.   Pickles, olives, relish and horseradish.   All desserts containing nuts, seeds, dried fruit and coconut; or made from whole grains or bran.   Candy made with nuts or seeds.   Popcorn.                     DIRECTIONS TO THE ENDOSCOPY DEPARTMENT     From the north ([facility])  Take [address], exit on [address] [address]. Get into the left hand david, turn left (east), go one-half mile to [address] and turn left. Go north to the first stoplight, take a right on [address] and follow it to the Emergency entrance.    From the south ([facility])  Take [address]N to the [address]E split and exit on [address] [address]. On [address] , turn left (west) to [address]. Turn right (north) on [address]. Go north to the first stoplight, take a right on [address] and follow it to the Emergency entrance.    From the east via 35E ([facility])  Take [address] south to [address] [address] exit. Turn right on [address] . Go west to [address]. Turn right (north) on [address]. Go to the first stoplight, take a right and follow on [address] to the Emergency entrance.    From the east via [address] ([facility])  Take [address] to [address]. Turn left (south) on [address] to [address]. Turn left (east) on [address] and follow it to the Emergency entrance.    From the west via [address] ([place], [place])  Take [address] east to  [address]. Turn right (south) on [address] to [facility]. Turn left (east) on [facility] and follow it to the Emergency entrance.

## 2019-01-22 NOTE — DISCHARGE INSTRUCTIONS
Understanding Diverticulosis and Diverticulitis     Pouches or diverticula usually occur in the lower part of the colon called the sigmoid.      Diverticulitis occurs when the pouches become inflamed.     The colon (large intestine) is the last part of the digestive tract. It absorbs water from stool and changes it from a liquid to a solid. In certain cases, small pouches called diverticula can form in the colon wall. This condition is called diverticulosis. The pouches can become infected. If this happens, it becomes a more serious problem called diverticulitis. These problems can be painful. But they can be managed.   Managing Your Condition  Diet changes or taking medications are often tried first. These may be enough to bring relief. If the case is bad, surgery may be done. You and your doctor can discuss the plan that is best for you.  If You Have Diverticulosis  Diet changes are often enough to control symptoms. The main changes are adding fiber (roughage) and drinking more water. Fiber absorbs water as it travels through your colon. This helps your stool stay soft and move smoothly. Water helps this process. If needed, you may be told to take over-the-counter stool softeners. To help relieve pain, antispasmodic medications may be prescribed.  If You Have Diverticulitis  Treatment depends on how bad your symptoms are.  For mild symptoms: You may be put on a liquid diet for a short time. You may also be prescribed antibiotics. If these two steps relieve your symptoms, you may then be prescribed a high-fiber diet. If you still have symptoms, your doctor will discuss further treatment options with you.  For severe symptoms: You may need to be admitted to the hospital. There, you can be given IV antibiotics and fluids. Once symptoms are under control, the above treatments may be tried. If these don t control your condition, your doctor may discuss the option of having surgery with you.  [facility]ana to Colon  Health  Help keep your colon healthy with a diet that includes plenty of high-fiber fruits, vegetables, and whole grains. Drink plenty of liquids like water and juice. Your doctor may also recommend avoiding seeds and nuts.          [id number] Pelon Harkins, [address] [place] [address], [place] [address]. All rights reserved. This information is not intended as a substitute for professional medical care. Always follow your healthcare professional's instructions.

## 2019-01-22 NOTE — H&P
Pre-Endoscopy History and Physical     Sergio Estevez MRN# [id number]   YOB: 1955 Age: 63 year old     Date of Procedure: 2019  Primary care provider: Stu Delacruz  Type of Endoscopy: Colonoscopy with possible biopsy, possible polypectomy  Reason for Procedure: screen  Type of Anesthesia Anticipated: Conscious Sedation    HPI:    Sergio is a 63 year old male who will be undergoing the above procedure.      A history and physical has been performed. The patient's medications and allergies have been reviewed. The risks and benefits of the procedure and the sedation options and risks were discussed with the patient.  All questions were answered and informed consent was obtained.      He denies a personal or family history of anesthesia complications or bleeding disorders.     Patient Active Problem List   Diagnosis     Mixed hyperlipidemia     Mixed hearing loss, unilateral     Malignant neoplasm of prostate (H)     Insomnia     Hyperlipidemia LDL goal <100     Gout     Vitamin D insufficiency     Impotence of organic origin     History of prostate cancer     History of gout     Anxiety     Transient insomnia     Routine general medical examination at a health care facility     ACP (advance care planning)     BPV (benign positional vertigo), left     BPV (benign positional vertigo), right     DDD (degenerative disc disease), lumbar        Past Medical History:   Diagnosis Date     Bronchitis      DDD (degenerative disc disease), lumbar 2017     Malignant neoplasm (H) prostate     Pneumonia         Past Surgical History:   Procedure Laterality Date     OTHER SURGICAL HISTORY      4 toes amputated from industrial accident       Social History     Tobacco Use     Smoking status: Former Smoker     Last attempt to quit: 11/3/1982     Years since quittin.2     Smokeless tobacco: Never Used   Substance Use Topics     Alcohol use: Yes     Comment: ocasionally       Family History    Problem Relation Age of Onset     Cancer Mother      Breast Cancer Mother      Alzheimer Disease Father        Prior to Admission medications    Medication Sig Start Date End Date Taking? Authorizing Provider   allopurinol (ZYLOPRIM) 100 MG tablet TAKE 1 TABLET (100 MG) BY MOUTH DAILY. *APPT NEEDED FOR REFILLS* 12/14/18   Stu Delacruz MD   colchicine (COLCRYS) 0.6 MG tablet Take 1 tablet (0.6 mg) by mouth daily 10/10/16   Stu Delacruz MD   Ibuprofen (IBU PO) Take  by mouth.    Reported, Patient   indomethacin (INDOCIN) 25 MG capsule Take 1 capsule (25 mg) by mouth 2 times daily (with meals) 10/10/16   Stu Delacruz MD   indomethacin (INDOCIN) 50 MG capsule Take 1 capsule by mouth 3 times daily (with meals) for 5 days. 3/16/13 3/21/13  Hazel Caba MD   LORazepam (ATIVAN) 1 MG tablet TAKE 1 TABLET BY MOUTH EVERY 6 HOURS 1/17/19   Stu Delacruz MD   Multiple Vitamins-Minerals (MULTI FOR HIM 50+) TABS Take 1 tablet by mouth daily 10/10/16   Stu Delacruz MD   Plant Sterols and Stanols 450 MG TABS Take 2 tablets by mouth 2 times daily 10/10/16   Stu Delacruz MD   Red Yeast Rice Extract ([facility] RED YEAST RICE) 600 MG CAPS Take 2 capsules by mouth 2 times daily (with meals) 10/10/16   Stu Delacruz MD   sildenafil (VIAGRA) 100 MG tablet Take 1 tablet (100 mg) by mouth daily as needed 30 min to 4 hrs before sex. Do not use with nitroglycerin, terazosin or doxazosin. 10/15/18   Stu Delacruz MD   Vitamin D, Cholecalciferol, 1000 UNITS CAPS Take 1 tablet by mouth daily 10/10/16   Stu Delacruz MD   zolpidem (AMBIEN) 10 MG tablet Take 1 tablet (10 mg) by mouth nightly as needed for sleep 12/14/18   Stu Delacruz MD       Allergies   Allergen Reactions     Keflex [Cephalexin Hcl]         REVIEW OF SYSTEMS:   5 point ROS negative except as noted above in HPI, including Gen., Resp., CV, GI &  system  "review.    PHYSICAL EXAM:   There were no vitals taken for this visit. Estimated body mass index is 26.37 kg/m  as calculated from the following:    Height as of 9/10/18: 1.74 m (5' 8.5\").    Weight as of 9/10/18: 79.8 kg (176 lb).   GENERAL APPEARANCE: alert, and oriented  MENTAL STATUS: alert  AIRWAY EXAM: Mallampatti Class I (visualization of the soft palate, fauces, uvula, anterior and posterior pillars)  RESP: lungs clear to auscultation - no rales, rhonchi or wheezes  CV: regular rates and rhythm  DIAGNOSTICS:    Not indicated    IMPRESSION   ASA Class 2 - Mild systemic disease    PLAN:   Plan for Colonoscopy with possible biopsy, possible polypectomy. We discussed the risks, benefits and alternatives and the patient wished to proceed.    The above has been forwarded to the consulting provider.      Signed Electronically by: Tal Chang  January 22, 2019          "

## 2019-01-23 DIAGNOSIS — F41.9 ANXIETY: ICD-10-CM

## 2019-01-23 NOTE — TELEPHONE ENCOUNTER
Controlled Substance Refill Request for LORazepam (ATIVAN) 1 MG tablet  Problem List Complete:  Yes    No CSA on file     Last  check 8/6/18 No concerns    Last Written Prescription Date:  1/17/2019  Last Fill Quantity: 30 tablet,  # refills: 0   Last office visit: 9/10/2018 with prescribing provider:  LAKESHA Delacruz   Future Office Visit:      Controlled substance agreement: [unfilled]    Last Urine Drug Screen: No results found for: CDAUT, No results found for: COMDAT, No results found for: THC13, PCP13, COC13, MAMP13, OPI13, AMP13, BZO13, TCA13, MTD13, BAR13, OXY13, PPX13, BUP13     Processing:  Fax Rx to [facility]/pharmacy #[id number] - [place], [place] - [address] pharmacy    [email or web address]   checked in past 3 months?  No, route to RN

## 2019-01-24 LAB — COLONOSCOPY: NORMAL

## 2019-01-24 RX ORDER — LORAZEPAM 1 MG/1
TABLET ORAL
Qty: 30 TABLET | Refills: 0 | Status: SHIPPED | OUTPATIENT
Start: 2019-01-24 | End: 2019-02-04

## 2019-01-24 NOTE — TELEPHONE ENCOUNTER
[place] reviewed:  reviewed- recommend provider review Pt filled Rx 01/15/2019 #30 tablets    [place]:  [email or web address]

## 2019-02-03 ENCOUNTER — MYC MEDICAL ADVICE (OUTPATIENT)
Dept: FAMILY MEDICINE | Facility: CLINIC | Age: 64
End: 2019-02-03

## 2019-02-03 DIAGNOSIS — F51.01 PRIMARY INSOMNIA: ICD-10-CM

## 2019-02-03 DIAGNOSIS — M1A.00X0 IDIOPATHIC CHRONIC GOUT WITHOUT TOPHUS, UNSPECIFIED SITE: ICD-10-CM

## 2019-02-03 DIAGNOSIS — F51.02 TRANSIENT INSOMNIA: ICD-10-CM

## 2019-02-03 DIAGNOSIS — F41.9 ANXIETY: ICD-10-CM

## 2019-02-04 NOTE — TELEPHONE ENCOUNTER
"Last OV 09/10/2018.  Requested Prescriptions   Pending Prescriptions Disp Refills     LORazepam (ATIVAN) 1 MG tablet 30 tablet 0     Sig: TAKE 1 TABLET BY MOUTH EVERY 6 HOURS AS NEEDED    There is no refill protocol information for this order        zolpidem (AMBIEN) 10 MG tablet 30 tablet 0     Sig: Take 1 tablet (10 mg) by mouth nightly as needed for sleep    There is no refill protocol information for this order        allopurinol (ZYLOPRIM) 100 MG tablet 30 tablet 0     Sig: TAKE 1 TABLET (100 MG) BY MOUTH DAILY. *APPT NEEDED FOR REFILLS*    Gout Agents Protocol Failed - 2/4/2019 11:40 AM       Failed - CBC on file in past 12 months    Recent Labs   Lab Test 10/26/15  0839   WBC 5.8   RBC 5.25   HGB 15.7   HCT 44.7                   Failed - ALT on file in past 12 months    Recent Labs   Lab Test 10/10/16  1113   ALT 20            Passed - Has Uric Acid on file in past 12 months and value is less than 6    Recent Labs   Lab Test 09/10/18  0946   URIC 5.8     If level is 6mg/dL or greater, ok to refill one time and refer to provider.          Passed - Recent (12 mo) or future (30 days) visit within the authorizing provider's specialty    Patient had office visit in the last 12 months or has a visit in the next 30 days with authorizing provider or within the authorizing provider's specialty.  See \"Patient Info\" tab in inbasket, or \"Choose Columns\" in Meds & Orders section of the refill encounter.             Passed - Medication is active on med list       Passed - Patient is age 18 or older       Passed - Normal serum creatinine on file in the past 12 months    Recent Labs   Lab Test 09/10/18  0946   CR 1.19           [place] reviewed:  reviewed- All Rx's from PCP.     [place]:  [email or web address]    "

## 2019-02-05 RX ORDER — ZOLPIDEM TARTRATE 10 MG/1
10 TABLET ORAL
Qty: 30 TABLET | Refills: 0 | Status: SHIPPED | OUTPATIENT
Start: 2019-02-05 | End: 2019-03-12

## 2019-02-05 RX ORDER — LORAZEPAM 1 MG/1
TABLET ORAL
Qty: 30 TABLET | Refills: 0 | Status: SHIPPED | OUTPATIENT
Start: 2019-02-05 | End: 2019-02-14

## 2019-02-05 RX ORDER — ALLOPURINOL 100 MG/1
TABLET ORAL
Qty: 30 TABLET | Refills: 0 | Status: SHIPPED | OUTPATIENT
Start: 2019-02-05 | End: 2019-03-12

## 2019-02-14 DIAGNOSIS — F41.9 ANXIETY: ICD-10-CM

## 2019-02-15 RX ORDER — LORAZEPAM 1 MG/1
TABLET ORAL
Qty: 30 TABLET | Refills: 0 | Status: SHIPPED | OUTPATIENT
Start: 2019-02-15 | End: 2019-02-24

## 2019-02-15 NOTE — TELEPHONE ENCOUNTER
Controlled Substance Refill Request for LORazepam (ATIVAN) 1 MG tablet  Problem List Complete:  Yes    No CSA on file     Last  check 8/6/18 No concerns    Last Written Prescription Date:  2/5/2019  Last Fill Quantity: 30 tablet,  # refills: 0   Last office visit: 9/10/2018 with prescribing provider:  LAKESHA Delacruz   Future Office Visit:        Controlled substance agreement:   Encounter-Level CSA:    There are no encounter-level csa.     Patient-Level CSA:    There are no patient-level csa.         Last Urine Drug Screen: No results found for: CDAUT, No results found for: COMDAT, No results found for: THC13, PCP13, COC13, MAMP13, OPI13, AMP13, BZO13, TCA13, MTD13, BAR13, OXY13, PPX13, BUP13     Processing:  Fax Rx to [facility]/pharmacy #[id number] - [place], [place] - [address] [facility] pharmacy    [email or web address]   checked in past 3 months?  No, route to RN

## 2019-02-24 DIAGNOSIS — F41.9 ANXIETY: ICD-10-CM

## 2019-02-25 NOTE — TELEPHONE ENCOUNTER
Controlled Substance Refill Request for  LORAZEPAM 1 MG TABLET  Last Written Prescription Date:  02/15/2019  Last Fill Quantity: 30,   # refills: 0  Last Office Visit: 09/10/2018  Future Office visit:     Problem List Complete:  No     PROVIDER TO CONSIDER COMPLETION OF PROBLEM LIST AND OVERVIEW/CONTROLLED SUBSTANCE AGREEMENT    Controlled substance agreement:   Encounter-Level CSA:    There are no encounter-level csa.     Patient-Level CSA:    There are no patient-level csa.         Last Urine Drug Screen: No results found for: CDAUT, No results found for: COMDAT, No results found for: THC13, PCP13, COC13, MAMP13, OPI13, AMP13, BZO13, TCA13, MTD13, BAR13, OXY13, PPX13, BUP13     Processing:  Fax Rx to requested pharmacy     [email or web address]       checked in past 3 months?  Yes 2/25/19   Routing refill request to provider for review/approval because:  Drug not on the [facility] refill protocol

## 2019-02-25 NOTE — TELEPHONE ENCOUNTER
LORAZEPAM 1 MG TABLET  Last Written Prescription Date:  02/15/2019  Last Fill Quantity: 30,   # refills: 0  Last Office Visit: 09/10/2018  Future Office visit:       Routing refill request to provider for review/approval because:  Drug not on the [facility], [facility] or [facility] refill protocol or controlled substance

## 2019-02-26 RX ORDER — LORAZEPAM 1 MG/1
TABLET ORAL
Qty: 30 TABLET | Refills: 0 | Status: SHIPPED | OUTPATIENT
Start: 2019-02-26 | End: 2019-03-11

## 2019-03-10 ENCOUNTER — MYC MEDICAL ADVICE (OUTPATIENT)
Dept: FAMILY MEDICINE | Facility: CLINIC | Age: 64
End: 2019-03-10

## 2019-03-10 DIAGNOSIS — M1A.00X0 IDIOPATHIC CHRONIC GOUT WITHOUT TOPHUS, UNSPECIFIED SITE: ICD-10-CM

## 2019-03-10 DIAGNOSIS — F41.9 ANXIETY: ICD-10-CM

## 2019-03-10 DIAGNOSIS — F51.02 TRANSIENT INSOMNIA: ICD-10-CM

## 2019-03-11 NOTE — TELEPHONE ENCOUNTER
Requested Prescriptions   Pending Prescriptions Disp Refills     Controlled Substance Refill Request for zolpidem (AMBIEN) 10 MG tablet  Problem List Complete:  Yes    Problem list name updated by automated process. Provider to review     No CSA on file  Last  check - 02/04/20019- no concerns    Last Written Prescription Date:  2/5/2019  Last Fill Quantity: 30 tablet,  # refills: 0   Last office visit: 9/10/2018 with prescribing provider:  LAKESHA Delacruz   Future Office Visit:        Controlled substance agreement:   Encounter-Level CSA:    There are no encounter-level csa.     Patient-Level CSA:    There are no patient-level csa.         Last Urine Drug Screen: No results found for: CDAUT, No results found for: COMDAT, No results found for: THC13, PCP13, COC13, MAMP13, OPI13, AMP13, BZO13, TCA13, MTD13, BAR13, OXY13, PPX13, BUP13     Processing:  Fax Rx to [facility] in [facility]    [email or web address]   checked in past 3 months?  Yes 2/4/2019                         Controlled Substance Refill Request for LORazepam (ATIVAN) 1 MG tablet  Problem List Complete:  Yes    No CSA on file     Last  check 2/25/19 no concerns    Last Written Prescription Date:  2/26/2019  Last Fill Quantity: 30 tablet,  # refills: 0   Last office visit: 9/10/2018 with prescribing provider:  LAKESHA Delacruz   Future Office Visit:        Controlled substance agreement:   Encounter-Level CSA:    There are no encounter-level csa.     Patient-Level CSA:    There are no patient-level csa.         Last Urine Drug Screen: No results found for: CDAUT, No results found for: COMDAT, No results found for: THC13, PCP13, COC13, MAMP13, OPI13, AMP13, BZO13, TCA13, MTD13, BAR13, OXY13, PPX13, BUP13     Processing:  Fax Rx to [facility] in [facility]    [email or web address]   checked in past 3 months?  Yes 2/25/2019                    allopurinol (ZYLOPRIM) 100 MG tablet  Last Written Prescription Date:   "2/5/2019  Last Fill Quantity: 30 tablet,  # refills: 0   Last office visit: 9/10/2018 with prescribing provider:  LAEKSHA Delacruz   Future Office Visit:     30 tablet 0     Sig: TAKE 1 TABLET (100 MG) BY MOUTH DAILY. *APPT NEEDED FOR REFILLS*    Gout Agents Protocol Failed - 3/11/2019  7:32 AM       Failed - CBC on file in past 12 months    Recent Labs   Lab Test 10/26/15  0839   WBC 5.8   RBC 5.25   HGB 15.7   HCT 44.7                   Failed - ALT on file in past 12 months    Recent Labs   Lab Test 10/10/16  1113   ALT 20            Passed - Has Uric Acid on file in past 12 months and value is less than 6    Recent Labs   Lab Test 09/10/18  0946   URIC 5.8     If level is 6mg/dL or greater, ok to refill one time and refer to provider.          Passed - Recent (12 mo) or future (30 days) visit within the authorizing provider's specialty    Patient had office visit in the last 12 months or has a visit in the next 30 days with authorizing provider or within the authorizing provider's specialty.  See \"Patient Info\" tab in inbasket, or \"Choose Columns\" in Meds & Orders section of the refill encounter.             Passed - Medication is active on med list       Passed - Patient is age 18 or older       Passed - Normal serum creatinine on file in the past 12 months    Recent Labs   Lab Test 09/10/18  0946   CR 1.19                "

## 2019-03-12 RX ORDER — ZOLPIDEM TARTRATE 10 MG/1
10 TABLET ORAL
Qty: 30 TABLET | Refills: 0 | Status: SHIPPED | OUTPATIENT
Start: 2019-03-12 | End: 2019-04-02

## 2019-03-12 RX ORDER — LORAZEPAM 1 MG/1
1 TABLET ORAL EVERY 8 HOURS PRN
Qty: 30 TABLET | Refills: 2 | Status: SHIPPED | OUTPATIENT
Start: 2019-03-12 | End: 2019-04-02

## 2019-03-12 RX ORDER — ALLOPURINOL 100 MG/1
TABLET ORAL
Qty: 30 TABLET | Refills: 0 | Status: SHIPPED | OUTPATIENT
Start: 2019-03-12 | End: 2019-03-27

## 2019-03-12 NOTE — TELEPHONE ENCOUNTER
RX [place] ([place]) reviewed:  not reviewed/not due - last done on 2/5/19    [place]:  [email or web address]  Routing refill request to provider for review/approval because:  Drug not on the [facility] refill protocol     Routing refill request to provider for review/approval because:  Labs not current:  ALT

## 2019-03-20 ENCOUNTER — MYC MEDICAL ADVICE (OUTPATIENT)
Dept: FAMILY MEDICINE | Facility: CLINIC | Age: 64
End: 2019-03-20

## 2019-03-21 NOTE — TELEPHONE ENCOUNTER
Extremely confusing history with respect to have been refilled for MrShilpi Estevez  He states that he is living down in [place] and yet be getting requests for refills from his medications here [place] and also differing strength so until we can figure this out I am not going to give him any more refills I did by the way refill the this 1 mg 1 every 8 #30 with 2 refills on 3/12/2019  I left a message for that same information  Stu Delacruz Jr., MD

## 2019-03-26 ENCOUNTER — MYC MEDICAL ADVICE (OUTPATIENT)
Dept: FAMILY MEDICINE | Facility: CLINIC | Age: 64
End: 2019-03-26

## 2019-03-26 DIAGNOSIS — F41.9 ANXIETY: ICD-10-CM

## 2019-03-26 DIAGNOSIS — M1A.00X0 IDIOPATHIC CHRONIC GOUT WITHOUT TOPHUS, UNSPECIFIED SITE: ICD-10-CM

## 2019-03-26 DIAGNOSIS — F51.02 TRANSIENT INSOMNIA: ICD-10-CM

## 2019-03-27 DIAGNOSIS — F41.9 ANXIETY: ICD-10-CM

## 2019-03-27 DIAGNOSIS — F51.02 TRANSIENT INSOMNIA: ICD-10-CM

## 2019-03-27 DIAGNOSIS — M1A.00X0 IDIOPATHIC CHRONIC GOUT WITHOUT TOPHUS, UNSPECIFIED SITE: ICD-10-CM

## 2019-03-27 RX ORDER — ZOLPIDEM TARTRATE 10 MG/1
10 TABLET ORAL
Qty: 30 TABLET | Refills: 0 | Status: CANCELLED | OUTPATIENT
Start: 2019-03-27

## 2019-03-27 RX ORDER — LORAZEPAM 1 MG/1
1 TABLET ORAL EVERY 8 HOURS PRN
Qty: 30 TABLET | Refills: 2 | Status: CANCELLED | OUTPATIENT
Start: 2019-03-27

## 2019-03-27 RX ORDER — ALLOPURINOL 100 MG/1
TABLET ORAL
Qty: 30 TABLET | Refills: 0 | Status: SHIPPED | OUTPATIENT
Start: 2019-03-27 | End: 2019-04-02

## 2019-03-27 NOTE — TELEPHONE ENCOUNTER
"Pt is requesting refills for Allopurinol, Lorazapam and Zolpidem. He is in [place] until the last week of May this year and needs refills until back to [place]. Please advice   Ok to wait for provider review 03/28/2019.     Requested Prescriptions   Pending Prescriptions Disp Refills     allopurinol (ZYLOPRIM) 100 MG tablet  Last Written Prescription Date:  3/12/2019  Last Fill Quantity: 30 tablet,  # refills: 0   Last office visit: 9/10/2018 with prescribing provider:  LAKESHA Delacruz   Future Office Visit:      30 tablet 0       Sig: TAKE 1 TABLET (100 MG) BY MOUTH DAILY. *APPT NEEDED FOR REFILLS*     Gout Agents Protocol Failed - 3/27/2019  7:20 AM         Failed - CBC on file in past 12 months         Recent Labs   Lab Test 10/26/15  0839   WBC 5.8   RBC 5.25   HGB 15.7   HCT 44.7                         Failed - ALT on file in past 12 months         Recent Labs   Lab Test 10/10/16  1113   ALT 20                Passed - Has Uric Acid on file in past 12 months and value is less than 6         Recent Labs   Lab Test 09/10/18  0946   URIC 5.8      If level is 6mg/dL or greater, ok to refill one time and refer to provider.             Passed - Recent (12 mo) or future (30 days) visit within the authorizing provider's specialty     Patient had office visit in the last 12 months or has a visit in the next 30 days with authorizing provider or within the authorizing provider's specialty.  See \"Patient Info\" tab in inbasket, or \"Choose Columns\" in Meds & Orders section of the refill encounter.                 Passed - Medication is active on med list         Passed - Patient is age 18 or older         Passed - Normal serum creatinine on file in the past 12 months         Recent Labs   Lab Test 09/10/18  0946   CR 1.19                Controlled Substance Refill Request for LORazepam (ATIVAN) 1 MG tablet  Problem List Complete:  Yes     No CSA on file     Last  check 2/25/19 no concerns     Last Written Prescription Date: "  3/12/2019  Last Fill Quantity: 30 tablet,  # refills: 2   Last office visit: 9/10/2018 with prescribing provider:  LAKESHA Delacruz   Future Office Visit:          Controlled substance agreement:   Encounter-Level CSA:    There are no encounter-level csa.      Patient-Level CSA:    There are no patient-level csa.            Last Urine Drug Screen: No results found for: CDAUT, No results found for: COMDAT, No results found for: THC13, PCP13, COC13, MAMP13, OPI13, AMP13, BZO13, TCA13, MTD13, BAR13, OXY13, PPX13, BUP13     Processing:  Fax Rx to [facility]/pharmacy #[address] [place] [address]     [email or web address]   checked in past 3 months?  Yes 2/25/2019                                    Controlled Substance Refill Request for zolpidem (AMBIEN) 10 MG tablet  Problem List Complete:  Yes     Last Written Prescription Date:  3/12/2019  Last Fill Quantity: 30 tablet,  # refills: 0   Last office visit: 9/10/2018 with prescribing provider:  LAKESHA Delacruz   Future Office Visit:          Controlled substance agreement:   Encounter-Level CSA:    There are no encounter-level csa.      Patient-Level CSA:    There are no patient-level csa.            Last Urine Drug Screen: No results found for: CDAUT, No results found for: COMDAT, No results found for: THC13, PCP13, COC13, MAMP13, OPI13, AMP13, BZO13, TCA13, MTD13, BAR13, OXY13, PPX13, BUP13     Processing:  Fax Rx to [facility]/pharmacy #[address] [place] [address]     [email or web address]   checked in past 3 months?  Yes 2/25/2019

## 2019-03-27 NOTE — TELEPHONE ENCOUNTER
Reason for Call:  Other call back    Detailed comments: Patient is calling as he has a question about his medications. Please call to discuss.     Phone Number Patient can be reached at: Home number on file [phone] (home)    Best Time: any    Can we leave a detailed message on this number? YES    Call taken on 3/27/2019 at 2:21 PM by RICA MATTHEWS

## 2019-03-27 NOTE — TELEPHONE ENCOUNTER
"Requested Prescriptions   Pending Prescriptions Disp Refills     allopurinol (ZYLOPRIM) 100 MG tablet  Last Written Prescription Date:  3/12/2019  Last Fill Quantity: 30 tablet,  # refills: 0   Last office visit: 9/10/2018 with prescribing provider:  LAKESHA Delacruz   Future Office Visit:     30 tablet 0     Sig: TAKE 1 TABLET (100 MG) BY MOUTH DAILY. *APPT NEEDED FOR REFILLS*    Gout Agents Protocol Failed - 3/27/2019  7:20 AM       Failed - CBC on file in past 12 months    Recent Labs   Lab Test 10/26/15  0839   WBC 5.8   RBC 5.25   HGB 15.7   HCT 44.7                   Failed - ALT on file in past 12 months    Recent Labs   Lab Test 10/10/16  1113   ALT 20            Passed - Has Uric Acid on file in past 12 months and value is less than 6    Recent Labs   Lab Test 09/10/18  0946   URIC 5.8     If level is 6mg/dL or greater, ok to refill one time and refer to provider.          Passed - Recent (12 mo) or future (30 days) visit within the authorizing provider's specialty    Patient had office visit in the last 12 months or has a visit in the next 30 days with authorizing provider or within the authorizing provider's specialty.  See \"Patient Info\" tab in inbasket, or \"Choose Columns\" in Meds & Orders section of the refill encounter.             Passed - Medication is active on med list       Passed - Patient is age 18 or older       Passed - Normal serum creatinine on file in the past 12 months    Recent Labs   Lab Test 09/10/18  0946   CR 1.19             Controlled Substance Refill Request for LORazepam (ATIVAN) 1 MG tablet  Problem List Complete:  Yes    No CSA on file     Last  check 2/25/19 no concerns    Last Written Prescription Date:  3/12/2019  Last Fill Quantity: 30 tablet,  # refills: 2   Last office visit: 9/10/2018 with prescribing provider:  LAKESHA Delacruz   Future Office Visit:        Controlled substance agreement:   Encounter-Level CSA:    There are no encounter-level csa.     Patient-Level CSA: "    There are no patient-level csa.         Last Urine Drug Screen: No results found for: CDAUT, No results found for: COMDAT, No results found for: THC13, PCP13, COC13, MAMP13, OPI13, AMP13, BZO13, TCA13, MTD13, BAR13, OXY13, PPX13, BUP13     Processing:  Fax Rx to [facility]/pharmacy #[address] [place] [address]    [email or web address]   checked in past 3 months?  Yes 2/25/2019                         Controlled Substance Refill Request for zolpidem (AMBIEN) 10 MG tablet  Problem List Complete:  Yes    Last Written Prescription Date:  3/12/2019  Last Fill Quantity: 30 tablet,  # refills: 0   Last office visit: 9/10/2018 with prescribing provider:  LAKESHA Delacruz   Future Office Visit:        Controlled substance agreement:   Encounter-Level CSA:    There are no encounter-level csa.     Patient-Level CSA:    There are no patient-level csa.         Last Urine Drug Screen: No results found for: CDAUT, No results found for: COMDAT, No results found for: THC13, PCP13, COC13, MAMP13, OPI13, AMP13, BZO13, TCA13, MTD13, BAR13, OXY13, PPX13, BUP13     Processing:  Fax Rx to [facility]/pharmacy [address] [place] [address]    [email or web address]   checked in past 3 months?  Yes 2/25/2019

## 2019-04-02 RX ORDER — LORAZEPAM 1 MG/1
1 TABLET ORAL EVERY 8 HOURS PRN
Qty: 30 TABLET | Refills: 2 | Status: SHIPPED | OUTPATIENT
Start: 2019-04-02 | End: 2019-04-02

## 2019-04-02 RX ORDER — ALLOPURINOL 100 MG/1
TABLET ORAL
Qty: 30 TABLET | Refills: 0 | Status: SHIPPED | OUTPATIENT
Start: 2019-04-02 | End: 2020-07-15

## 2019-04-02 RX ORDER — ZOLPIDEM TARTRATE 10 MG/1
10 TABLET ORAL
Qty: 30 TABLET | Refills: 0 | Status: SHIPPED | OUTPATIENT
Start: 2019-04-15 | End: 2019-05-17

## 2019-04-02 RX ORDER — LORAZEPAM 1 MG/1
1 TABLET ORAL EVERY 8 HOURS PRN
Qty: 30 TABLET | Refills: 0 | Status: SHIPPED | OUTPATIENT
Start: 2019-04-15 | End: 2019-05-17

## 2019-04-02 NOTE — TELEPHONE ENCOUNTER
RX for lorazepam.and zolpidem have been faxed to [facility] in [place] [phone]

## 2019-05-17 DIAGNOSIS — F51.02 TRANSIENT INSOMNIA: ICD-10-CM

## 2019-05-17 DIAGNOSIS — F41.9 ANXIETY: ICD-10-CM

## 2019-05-17 NOTE — TELEPHONE ENCOUNTER
ZOLPIDEM TARTRATE 10 MG TABLET  Last Written Prescription Date:  04/15/2019  Last Fill Quantity: 30,   # refills: 0  Last Office Visit: 09/10/2018  Future Office visit:       Routing refill request to provider for review/approval because:  Drug not on the [facility], [facility] or [facility] refill protocol or controlled substance  Requested Prescriptions   Pending Prescriptions Disp Refills     zolpidem (AMBIEN) 10 MG tablet [Pharmacy Med Name: ZOLPIDEM TARTRATE 10 MG TABLET] 30 tablet 0     Sig: TAKE 1 TABLET BY MOUTH EVERY DAY AT BEDTIME AS NEEDED FOR SLEEP       There is no refill protocol information for this order        LORazepam (ATIVAN) 1 MG tablet [Pharmacy Med Name: LORAZEPAM 1 MG TABLET] 30 tablet 0     Sig: TAKE 1 TABLET BY MOUTH EVERY 8 HOURS AS NEEDED FOR ANXIETY *START DATE PER DR IS 4/15/19*       There is no refill protocol information for this order        LORAZEPAM 1 MG TABLET      Last Written Prescription Date:  04/15/2019  Last Fill Quantity: 30,   # refills: 0  Last Office Visit: 09/10/2018  Future Office visit:       Routing refill request to provider for review/approval because:  Drug not on the [facility], [facility] or [facility] refill protocol or controlled substance  Requested Prescriptions   Pending Prescriptions Disp Refills     zolpidem (AMBIEN) 10 MG tablet [Pharmacy Med Name: ZOLPIDEM TARTRATE 10 MG TABLET] 30 tablet 0     Sig: TAKE 1 TABLET BY MOUTH EVERY DAY AT BEDTIME AS NEEDED FOR SLEEP       There is no refill protocol information for this order        LORazepam (ATIVAN) 1 MG tablet [Pharmacy Med Name: LORAZEPAM 1 MG TABLET] 30 tablet 0     Sig: TAKE 1 TABLET BY MOUTH EVERY 8 HOURS AS NEEDED FOR ANXIETY *START DATE PER DR IS 4/15/19*       There is no refill protocol information for this order

## 2019-05-19 ENCOUNTER — MYC MEDICAL ADVICE (OUTPATIENT)
Dept: FAMILY MEDICINE | Facility: CLINIC | Age: 64
End: 2019-05-19

## 2019-05-20 RX ORDER — ZOLPIDEM TARTRATE 10 MG/1
TABLET ORAL
Qty: 30 TABLET | Refills: 0 | Status: SHIPPED | OUTPATIENT
Start: 2019-05-20 | End: 2019-06-26

## 2019-05-20 RX ORDER — LORAZEPAM 1 MG/1
TABLET ORAL
Qty: 30 TABLET | Refills: 0 | Status: SHIPPED | OUTPATIENT
Start: 2019-05-20 | End: 2019-06-15

## 2019-05-20 NOTE — TELEPHONE ENCOUNTER
Controlled Substance Refill Request for Ambien  Problem List Complete:  No     PROVIDER TO CONSIDER COMPLETION OF PROBLEM LIST AND OVERVIEW/CONTROLLED SUBSTANCE AGREEMENT    Last Written Prescription Date:  4/15/2019  Last Fill Quantity: 30,   # refills: 0  Last Office Visit with [facility] primary care provider: Jayme Aldridge Office visit:     Controlled substance agreement:   Encounter-Level CSA:    There are no encounter-level csa.     Patient-Level CSA:    There are no patient-level csa.         Last Urine Drug Screen: No results found for: CDAUT, No results found for: COMDAT, No results found for: THC13, PCP13, COC13, MAMP13, OPI13, AMP13, BZO13, TCA13, MTD13, BAR13, OXY13, PPX13, BUP13     Processing:  Fax Rx to requested pharmacy     [email or web address]       checked in past 3 months?  Yes 5/20/19, no concerns      RX [place] ([place] reviewed:  reviewed- no concerns    [place]:  [email or web address]  Routing refill request to provider for review/approval because:  Drug not on the [facility] refill protocol     Controlled Substance Refill Request for Lorazepam  Problem List Complete:  No     PROVIDER TO CONSIDER COMPLETION OF PROBLEM LIST AND OVERVIEW/CONTROLLED SUBSTANCE AGREEMENT    Last Written Prescription Date:  4/15/2019  Last Fill Quantity: 30,   # refills: 0    Last Office Visit with [facility] primary care provider: Jayme Aldridge Office visit:     Controlled substance agreement:   Encounter-Level CSA:    There are no encounter-level csa.     Patient-Level CSA:    There are no patient-level csa.         Last Urine Drug Screen: No results found for: CDAUT, No results found for: COMDAT, No results found for: THC13, PCP13, COC13, MAMP13, OPI13, AMP13, BZO13, TCA13, MTD13, BAR13, OXY13, PPX13, BUP13     Processing:  Fax Rx to requested pharmacy     [email or web address]       checked in past 3 months?  Yes 5/20/2019      RX monitoring program ([place])  reviewed:  reviewed- no concerns    [place] profile:  [email or web address]  Routing refill request to provider for review/approval because:  Drug not on the [facility] refill protocol

## 2019-06-15 DIAGNOSIS — F41.9 ANXIETY: ICD-10-CM

## 2019-06-17 NOTE — TELEPHONE ENCOUNTER
Controlled Substance Refill Request for LORazepam (ATIVAN) 1 MG tablet  Problem List Complete:  Yes    No CSA on file     Last  check 5/20/19 no concerns    Last Written Prescription Date:  5/20/2019  Last Fill Quantity: 30 tablet,  # refills: 0   Last office visit: 9/10/2018 with prescribing provider:  LAKESHA Delacruz   Future Office Visit:        Controlled substance agreement:   Encounter-Level CSA:    There are no encounter-level csa.     Patient-Level CSA:    There are no patient-level csa.         Last Urine Drug Screen: No results found for: CDAUT, No results found for: COMDAT, No results found for: THC13, PCP13, COC13, MAMP13, OPI13, AMP13, BZO13, TCA13, MTD13, BAR13, OXY13, PPX13, BUP13     Processing:  Fax Rx to [facility]/pharmacy #[id number] - [place], [place] - [address] [facility] pharmacy    [email or web address]   checked in past 3 months?  Yes 5/20/2019

## 2019-06-18 RX ORDER — LORAZEPAM 1 MG/1
TABLET ORAL
Qty: 30 TABLET | Refills: 0 | Status: SHIPPED | OUTPATIENT
Start: 2019-06-18 | End: 2019-06-26

## 2019-06-18 NOTE — TELEPHONE ENCOUNTER
Called patient, he is back in [place]. RX should be sent to [facility] until Oct.  RX faxed [phone]

## 2019-06-18 NOTE — TELEPHONE ENCOUNTER
RX monitoring program ([place]) reviewed:  not reviewed/not due - last done on 5/20/19 no concerns    [place]:  [email or web address]  Routing refill request to provider for review/approval because:  Drug not on the [facility] refill protocol

## 2019-06-26 DIAGNOSIS — F41.9 ANXIETY: ICD-10-CM

## 2019-06-26 DIAGNOSIS — F51.02 TRANSIENT INSOMNIA: ICD-10-CM

## 2019-06-26 NOTE — TELEPHONE ENCOUNTER
Controlled Substance Refill Request for LORazepam (ATIVAN) 1 MG tablet  Problem List Complete:  Yes    No CSA on file     Last  check 5/20/19 no concerns    Last Written Prescription Date:  6/18/2019  Last Fill Quantity: 30 tablet,  # refills: 0   Last office visit: 9/10/2018 with prescribing provider:  LAKESHA Delacruz   Future Office Visit:        Controlled substance agreement:   Encounter-Level CSA:    There are no encounter-level csa.     Patient-Level CSA:    There are no patient-level csa.         Last Urine Drug Screen: No results found for: CDAUT, No results found for: COMDAT, No results found for: THC13, PCP13, COC13, MAMP13, OPI13, AMP13, BZO13, TCA13, MTD13, BAR13, OXY13, PPX13, BUP13     Processing:  Fax Rx to [facility]/pharmacy #[id number] - [place], [place] - [address] pharmacy    [email or web address]   checked in past 3 months?  Yes 5/20/2019

## 2019-06-26 NOTE — TELEPHONE ENCOUNTER
Controlled Substance Refill Request for zolpidem (AMBIEN) 10 MG tablet  Problem List Complete:  Yes    No CSA on file     Last  check 5/20/19 no concerns    Last Written Prescription Date:  5/20/2019  Last Fill Quantity: 30 tablet,  # refills: 0   Last office visit: 9/10/2018 with prescribing provider:  LAKESHA Delacruz   Future Office Visit:        Controlled substance agreement:   Encounter-Level CSA:    There are no encounter-level csa.     Patient-Level CSA:    There are no patient-level csa.         Last Urine Drug Screen: No results found for: CDAUT, No results found for: COMDAT, No results found for: THC13, PCP13, COC13, MAMP13, OPI13, AMP13, BZO13, TCA13, MTD13, BAR13, OXY13, PPX13, BUP13     Processing:  Fax Rx to [facility]/pharmacy #[id number] - [place], [place] - [address] [facility] [facility] pharmacy    [email or web address]   checked in past 3 months?  Yes 5/20/2019

## 2019-06-27 RX ORDER — LORAZEPAM 1 MG/1
TABLET ORAL
Qty: 30 TABLET | Refills: 0 | Status: SHIPPED | OUTPATIENT
Start: 2019-06-27 | End: 2019-07-08

## 2019-06-27 RX ORDER — ZOLPIDEM TARTRATE 10 MG/1
TABLET ORAL
Qty: 30 TABLET | Refills: 0 | Status: SHIPPED | OUTPATIENT
Start: 2019-06-27 | End: 2019-07-25

## 2019-07-02 ENCOUNTER — OFFICE VISIT (OUTPATIENT)
Dept: FAMILY MEDICINE | Facility: CLINIC | Age: 64
End: 2019-07-02
Payer: COMMERCIAL

## 2019-07-02 VITALS
SYSTOLIC BLOOD PRESSURE: 132 MMHG | DIASTOLIC BLOOD PRESSURE: 81 MMHG | OXYGEN SATURATION: 98 % | TEMPERATURE: 98.4 F | HEART RATE: 60 BPM | RESPIRATION RATE: 16 BRPM | WEIGHT: 169 LBS | BODY MASS INDEX: 24.6 KG/M2

## 2019-07-02 DIAGNOSIS — J20.9 ACUTE BRONCHITIS, UNSPECIFIED ORGANISM: ICD-10-CM

## 2019-07-02 DIAGNOSIS — J02.9 SORE THROAT: Primary | ICD-10-CM

## 2019-07-02 LAB
DEPRECATED S PYO AG THROAT QL EIA: NORMAL
SPECIMEN SOURCE: NORMAL

## 2019-07-02 PROCEDURE — 87081 CULTURE SCREEN ONLY: CPT | Performed by: PHYSICIAN ASSISTANT

## 2019-07-02 PROCEDURE — 87880 STREP A ASSAY W/OPTIC: CPT | Performed by: PHYSICIAN ASSISTANT

## 2019-07-02 PROCEDURE — 99213 OFFICE O/P EST LOW 20 MIN: CPT | Performed by: PHYSICIAN ASSISTANT

## 2019-07-02 RX ORDER — CODEINE PHOSPHATE AND GUAIFENESIN 10; 100 MG/5ML; MG/5ML
1-2 SOLUTION ORAL
Qty: 180 ML | Refills: 0 | Status: SHIPPED | OUTPATIENT
Start: 2019-07-02 | End: 2020-07-15

## 2019-07-02 RX ORDER — PREDNISONE 20 MG/1
40 TABLET ORAL DAILY
Qty: 10 TABLET | Refills: 0 | Status: SHIPPED | OUTPATIENT
Start: 2019-07-02 | End: 2019-07-07

## 2019-07-02 NOTE — PATIENT INSTRUCTIONS
Take the complete course of the steroid.    Call if not improving after finishing it and we can try an antibiotic.      Upper respiratory infections are usually caused by viruses and, sometimes certain bacteria.  Antibiotics don't help the vast majority of people recover any quicker even when caused by a bacteria.  The body will fight this infection but it needs to be treated well in order to help heal itself.  Rest as needed.  It is ok to reduce food intake if appetite is poor but it is quite important to maintain/increase fluid intake.    For cough, dextromethorphan/guaifenesin combinations help loosen secretions and suppress cough safely without significant risk of sedation. Often 2 puffs four times daily of an albuterol inhaler will help with bronchitis.  This is a prescription medicine.    For nasal congestion and sinus pressure, pseudoephedrine (Sudafed) or phenylephrine is often helpful but it can cause elevations in blood pressure and insomnia.  Short courses of a nasal decongestant spray (Afrin or Neosinephrine) can be appropriate but their use should be restricted to 3 days due to the high risk of nasal addiction.    For pain and fevers, acetaminophen (Tylenol) is most appropriate.  Ibuprofen (Advil) or naproxen (Aleve) are useful too and last longer but they can cause elevation of blood pressure or stomach problems.    Antihistamines (Benadryl, Dimetapp, etc.) cause sedation, confusion, bowel and urinary abnormalities and are of little use for infectious causes of cough and nasal congestion.  Their use should be reserved for allergic symptoms.

## 2019-07-02 NOTE — PROGRESS NOTES
"Subjective     Sergio Estevez is a 63 year old male who presents to clinic today for the following health issues:    HPI   Acute Illness   Acute illness concerns: Cough   Onset: 5 days     Fever: no    Chills/Sweats: no     Headache (location?): YES    Sinus Pressure:no    Conjunctivitis:  no    Ear Pain: no    Rhinorrhea: YES    Congestion: YES    Sore Throat: YES- worse with coughing and swallowing     Cough: YES-non-productive- started as productive    Wheeze: no     Decreased Appetite: YES    Nausea: no     Vomiting: no     Diarrhea:  YES    Dysuria/Freq.: no     Fatigue/Achiness: no     Sick/Strep Exposure: no- his \"life partner\"/girlfriend has asthma with bronchitis.      Therapies Tried and outcome: Nebulizer, ibuprofen, and cough drops with some improvement       Patient Active Problem List   Diagnosis     Mixed hyperlipidemia     Mixed hearing loss, unilateral     Malignant neoplasm of prostate (H)     Insomnia     Hyperlipidemia LDL goal <100     Gout     Vitamin D insufficiency     Impotence of organic origin     History of prostate cancer     History of gout     Anxiety     Transient insomnia     Routine general medical examination at a health care facility     ACP (advance care planning)     BPV (benign positional vertigo), left     BPV (benign positional vertigo), right     DDD (degenerative disc disease), lumbar     Past Surgical History:   Procedure Laterality Date     COLONOSCOPY N/A 2019    Procedure: COLONOSCOPY;  Surgeon: Tal Chang MD;  Location:  GI     OTHER SURGICAL HISTORY      4 toes amputated from industrial accident       Social History     Tobacco Use     Smoking status: Former Smoker     Last attempt to quit: 11/3/1982     Years since quittin.6     Smokeless tobacco: Never Used   Substance Use Topics     Alcohol use: Yes     Comment: ocasionally     Family History   Problem Relation Age of Onset     Cancer Mother      Breast Cancer Mother      Alzheimer Disease " Father          Current Outpatient Medications   Medication Sig Dispense Refill     allopurinol (ZYLOPRIM) 100 MG tablet ONE DAILY 30 tablet 0     colchicine (COLCRYS) 0.6 MG tablet Take 1 tablet (0.6 mg) by mouth daily 90 tablet 3     Ibuprofen (IBU PO) Take  by mouth.       indomethacin (INDOCIN) 25 MG capsule Take 1 capsule (25 mg) by mouth 2 times daily (with meals) 42 capsule 1     indomethacin (INDOCIN) 50 MG capsule Take 1 capsule by mouth 3 times daily (with meals) for 5 days. 15 capsule 0     LORazepam (ATIVAN) 1 MG tablet TAKE 1 TABLET BY MOUTH EVERY 8 HOURS AS NEEDED FOR ANXIETY 30 tablet 0     Multiple Vitamins-Minerals (MULTI FOR HIM 50+) TABS Take 1 tablet by mouth daily 30 tablet 11     Plant Sterols and Stanols 450 MG TABS Take 2 tablets by mouth 2 times daily 12 tablet PRN     Red Yeast Rice Extract ([employer]S RED YEAST RICE) 600 MG CAPS Take 2 capsules by mouth 2 times daily (with meals) 120 capsule 11     sildenafil (VIAGRA) 100 MG tablet Take 1 tablet (100 mg) by mouth daily as needed 30 min to 4 hrs before sex. Do not use with nitroglycerin, terazosin or doxazosin. 12 tablet 11     Vitamin D, Cholecalciferol, 1000 UNITS CAPS Take 1 tablet by mouth daily 30 capsule 11     zolpidem (AMBIEN) 10 MG tablet TAKE 1 TABLET BY MOUTH EVERY DAY AT BEDTIME FOR SLEEP 30 tablet 0     Allergies   Allergen Reactions     Keflex [Cephalexin Hcl]          Reviewed and updated as needed this visit by Provider         Review of Systems   ROS COMP: Constitutional, HEENT, cardiovascular, pulmonary, gi and gu systems are negative, except as otherwise noted.      Objective    /90 (BP Location: Right arm, Patient Position: Chair, Cuff Size: Adult Regular)   Pulse 60   Temp 98.4  F (36.9  C) (Oral)   Resp 16   Wt 76.7 kg (169 lb)   SpO2 98%   BMI 24.60 kg/m    Body mass index is 24.6 kg/m .       Physical Exam   GENERAL: healthy, alert and no distress  EYES: Eyes grossly normal to inspection, PERRL and conjunctivae  and sclerae normal  HENT: ear canals and TM's normal, nose and mouth without ulcers or lesions  RESP: lungs clear to auscultation - no rales, rhonchi or wheezes  CV: regular rate and rhythm, normal S1 S2, no S3 or S4, no murmur, click or rub, no peripheral edema and peripheral pulses strong  MS: no gross musculoskeletal defects noted, no edema  SKIN: no suspicious lesions or rashes  NEURO: Normal strength and tone, mentation intact and speech normal  PSYCH: mentation appears normal, affect normal/bright  LYMPH: no cervical, supraclavicular, axillary, or inguinal adenopathy    Diagnostic Test Results:  Results for orders placed or performed in visit on 07/02/19 (from the past 24 hour(s))   Rapid strep screen   Result Value Ref Range    Specimen Description Throat     Rapid Strep A Screen       NEGATIVE: No Group A streptococcal antigen detected by immunoassay, await culture report.           Assessment & Plan     (J02.9) Sore throat  (primary encounter diagnosis)    Comment: Likely viral since rapid strep is negative. Await culture.    Plan: Rapid strep screen, Beta strep group A culture            (J20.9) Acute bronchitis, unspecified organism    Comment: Treat with steroid and cough syrup at bedtime. Patient is aware that he should not take Ambien or lorazepam with this. Call if not improving after finishing the steroid and we can try an antibiotic.    Plan: predniSONE (DELTASONE) 20 MG tablet,         guaiFENesin-codeine (ROBITUSSIN AC) 100-10         MG/5ML solution                 Patient Instructions   Take the complete course of the steroid.    Call if not improving after finishing it and we can try an antibiotic.      Upper respiratory infections are usually caused by viruses and, sometimes certain bacteria.  Antibiotics don't help the vast majority of people recover any quicker even when caused by a bacteria.  The body will fight this infection but it needs to be treated well in order to help heal itself.   Rest as needed.  It is ok to reduce food intake if appetite is poor but it is quite important to maintain/increase fluid intake.    For cough, dextromethorphan/guaifenesin combinations help loosen secretions and suppress cough safely without significant risk of sedation. Often 2 puffs four times daily of an albuterol inhaler will help with bronchitis.  This is a prescription medicine.    For nasal congestion and sinus pressure, pseudoephedrine (Sudafed) or phenylephrine is often helpful but it can cause elevations in blood pressure and insomnia.  Short courses of a nasal decongestant spray (Afrin or Neosinephrine) can be appropriate but their use should be restricted to 3 days due to the high risk of nasal addiction.    For pain and fevers, acetaminophen (Tylenol) is most appropriate.  Ibuprofen (Advil) or naproxen (Aleve) are useful too and last longer but they can cause elevation of blood pressure or stomach problems.    Antihistamines (Benadryl, Dimetapp, etc.) cause sedation, confusion, bowel and urinary abnormalities and are of little use for infectious causes of cough and nasal congestion.  Their use should be reserved for allergic symptoms.        No follow-ups on file.    Mian Mcclain PA-C  [facility]

## 2019-07-03 LAB
BACTERIA SPEC CULT: NORMAL
SPECIMEN SOURCE: NORMAL

## 2019-07-08 DIAGNOSIS — F41.9 ANXIETY: ICD-10-CM

## 2019-07-09 RX ORDER — LORAZEPAM 1 MG/1
TABLET ORAL
Qty: 30 TABLET | Refills: 0 | Status: SHIPPED | OUTPATIENT
Start: 2019-07-09 | End: 2019-07-27

## 2019-07-09 NOTE — TELEPHONE ENCOUNTER
Controlled Substance Refill Request for LORazepam (ATIVAN) 1 MG tablet  Problem List Complete:  No     PROVIDER TO CONSIDER COMPLETION OF PROBLEM LIST AND OVERVIEW/CONTROLLED SUBSTANCE AGREEMENT    Controlled substance agreement:   Encounter-Level CSA:    There are no encounter-level csa.     Patient-Level CSA:    There are no patient-level csa.         Last Urine Drug Screen: No results found for: CDAUT, No results found for: COMDAT, No results found for: THC13, PCP13, COC13, MAMP13, OPI13, AMP13, BZO13, TCA13, MTD13, BAR13, OXY13, PPX13, BUP13     Processing:  Fax      [email or web address]       checked in past 3 months?  Yes 5/20/19

## 2019-07-09 NOTE — TELEPHONE ENCOUNTER
Prescription faxed to [facility] to # [phone] today.  Chely Lei LPN

## 2019-07-09 NOTE — TELEPHONE ENCOUNTER
He appears to be taking this bid.                   He has not seen his doctor in 10 months.        (9/10/18).                                    I refilled his Rx for Dr. Delacruz ×1.                        Please help him get an appointment with Dr. Delacruz to discuss his ongoing use of this medication.                                      I approved the Rx. Please phone it in or fax it to the pharmacy.

## 2019-07-09 NOTE — TELEPHONE ENCOUNTER
Patient Contact    Attempt # 1    Was call answered?  No.  Left message on voicemail with information to call triage back.    Upon callback, schedule patient for appointment.

## 2019-07-09 NOTE — TELEPHONE ENCOUNTER
LORAZEPAM 1 MG TABLET      Last Written Prescription Date:  06/27/2019  Last Fill Quantity: 30,   # refills: 0  Last Office Visit: 07/02/2019  Future Office visit:       Routing refill request to provider for review/approval because:  Drug not on the [facility], [facility] or [facility] refill protocol or controlled substance  Requested Prescriptions   Pending Prescriptions Disp Refills     LORazepam (ATIVAN) 1 MG tablet [Pharmacy Med Name: LORAZEPAM 1 MG TABLET] 30 tablet 0     Sig: TAKE 1 TAB BY MOUTH EVERY 8 HOURS AS NEEDED FOR ANXIETY       There is no refill protocol information for this order

## 2019-07-10 NOTE — TELEPHONE ENCOUNTER
Called patient and scheduled his annual office visit. He only gets one visit covered by insurance annually. He will be coming in on 9/10

## 2019-07-24 DIAGNOSIS — F41.9 ANXIETY: ICD-10-CM

## 2019-07-24 RX ORDER — LORAZEPAM 1 MG/1
TABLET ORAL
Qty: 30 TABLET | Refills: 0 | OUTPATIENT
Start: 2019-07-24

## 2019-07-24 NOTE — TELEPHONE ENCOUNTER
Pharmacy called, said they faxed this refill request 2 times, pharmacist states that rx from 7/9/2019 was only a 10 day supply.  Refill needed ASAP.

## 2019-07-24 NOTE — TELEPHONE ENCOUNTER
Routing refill request to provider for review/approval because:  From refill 7/8/2019    Pt. To provider response:            TODAY'S MESSAGE:            Controlled Substance Refill Request for LORazepam (ATIVAN) 1 MG tablet  Problem List Complete:  No      PROVIDER TO CONSIDER COMPLETION OF PROBLEM LIST AND OVERVIEW/CONTROLLED SUBSTANCE AGREEMENT     Controlled substance agreement:   Encounter-Level CSA:    There are no encounter-level csa.      Patient-Level CSA:    There are no patient-level csa.            Last Urine Drug Screen: No results found for: CDAUT, No results found for: COMDAT, No results found for: THC13, PCP13, COC13, MAMP13, OPI13, AMP13, BZO13, TCA13, MTD13, BAR13, OXY13, PPX13, BUP13     Processing:  Fax       [email or web address]         checked in past 3 months?  Yes 5/20/19

## 2019-07-24 NOTE — TELEPHONE ENCOUNTER
Needs appointment or to wait for PCP who may feel comfortable filling.  Dr. Linda Vanegas MD/[facility]

## 2019-07-25 DIAGNOSIS — F41.9 ANXIETY: ICD-10-CM

## 2019-07-25 DIAGNOSIS — F51.02 TRANSIENT INSOMNIA: ICD-10-CM

## 2019-07-26 NOTE — TELEPHONE ENCOUNTER
Controlled Substance Refill Request for zolpidem (AMBIEN) 10 MG tablet  Problem List Complete:  Yes    Problem list name updated by automated process. Provider to review     No CSA on file  Last  check - 5/20/19 no concerns    Last Written Prescription Date:  6/27/2019  Last Fill Quantity: 30 tablet,  # refills: 0   Last office visit: 9/10/2018 with prescribing provider:  LAKESHA Delacruz Office Visit:   Next 5 appointments (look out 90 days)    Sep 10, 2019  8:30 AM CDT  [facility] [address] [address] [phone]           Controlled substance agreement:   Encounter-Level CSA:    There are no encounter-level csa.     Patient-Level CSA:    There are no patient-level csa.         Last Urine Drug Screen: No results found for: CDAUT, No results found for: COMDAT, No results found for: THC13, PCP13, COC13, MAMP13, OPI13, AMP13, BZO13, TCA13, MTD13, BAR13, OXY13, PPX13, BUP13     Processing:  Fax Rx to pended pharmacy    [email or web address]   checked in past 3 months?  Yes 5/20/2019

## 2019-07-27 DIAGNOSIS — F41.9 ANXIETY: ICD-10-CM

## 2019-07-27 NOTE — TELEPHONE ENCOUNTER
LORazepam (ATIVAN) 1 MG tablet      Last Written Prescription Date:  7/9/19  Last Fill Quantity: 30 tablet,   # refills: 0  Last Office Visit: 7/2/19  Future Office visit:    Next 5 appointments (look out 90 days)    Sep 10, 2019  8:30 AM CDT  Office Visit with Stu [facility]acruz MD  [facility] [address] [place] [address] [phone]           Routing refill request to provider for review/approval because:  Drug not on the [facility], P or [facility] refill protocol or controlled substance

## 2019-07-28 NOTE — TELEPHONE ENCOUNTER
NEEDS OFFICE VISIT     OVER 10 MONTH S    THINGS HAVE CHANGED     IS HE STILL LIVING IN [place]    TARSHA LUU JR., MD

## 2019-07-29 DIAGNOSIS — F41.9 ANXIETY: ICD-10-CM

## 2019-07-29 NOTE — TELEPHONE ENCOUNTER
Per note below, patient has an appt scheduled for Sept.   He lives in [place] during the winter and is in [place] for the summer.

## 2019-07-30 NOTE — TELEPHONE ENCOUNTER
Requested Prescriptions   Pending Prescriptions Disp Refills     LORazepam (ATIVAN) 1 MG tablet [Pharmacy Med Name: LORAZEPAM 1 MG TABLET]      Last Written Prescription Date:  7/9/19  Last Fill Quantity: 30,   # refills: 0  Last Office Visit: 9/10/18 aleksandra  Future Office visit:    Next 5 appointments (look out 90 days)    Sep 10, 2019  8:30 AM CDT  Office Visit with Stu Delacruz MD  [facility] [address] [address] [phone]           Routing refill request to provider for review/approval because:  Drug not on the [facility], P or  Health refill protocol or controlled substance   30 tablet 0     Sig: TAKE 1 TABLET BY MOUTH EVERY 8 HOURS AS NEEDED FOR ANXIETY       There is no refill protocol information for this order

## 2019-07-31 NOTE — TELEPHONE ENCOUNTER
Looks like this is a second request-first one on th 24th. He really shouldn't be out yet.  was checked 7/29/19    Dina Keys RN- Triage [employer]

## 2019-08-01 RX ORDER — LORAZEPAM 1 MG/1
TABLET ORAL
Qty: 30 TABLET | Refills: 0 | Status: SHIPPED | OUTPATIENT
Start: 2019-08-01 | End: 2019-08-18

## 2019-08-01 RX ORDER — ZOLPIDEM TARTRATE 10 MG/1
TABLET ORAL
Qty: 30 TABLET | Refills: 0 | Status: SHIPPED | OUTPATIENT
Start: 2019-08-01 | End: 2019-08-28

## 2019-08-01 RX ORDER — LORAZEPAM 1 MG/1
TABLET ORAL
Qty: 30 TABLET | Refills: 0 | OUTPATIENT
Start: 2019-08-01

## 2019-08-18 DIAGNOSIS — F41.9 ANXIETY: ICD-10-CM

## 2019-08-19 RX ORDER — LORAZEPAM 1 MG/1
TABLET ORAL
Qty: 15 TABLET | Refills: 0 | Status: SHIPPED | OUTPATIENT
Start: 2019-08-19 | End: 2019-08-24

## 2019-08-19 NOTE — TELEPHONE ENCOUNTER
Controlled Substance Refill Request for LORazepam (ATIVAN) 1 MG tablet  Problem List Complete:  Yes    No CSA on file     Last  check 07/29/19 no concerns    Last Written Prescription Date:  8/1/2019  Last Fill Quantity: 30 tablet,  # refills: 0   Last office visit: 9/10/2018 with prescribing provider:  LAKESHA Delacruz   Future Office Visit:   Next 5 appointments (look out 90 days)    Sep 10, 2019  8:30 AM CDT  Office Visit with Stu Delacruz MD  [facility] [address] [place] [address] [phone]             Controlled substance agreement:   Encounter-Level CSA:    There are no encounter-level csa.     Patient-Level CSA:    There are no patient-level csa.         Last Urine Drug Screen: No results found for: CDAUT, No results found for: COMDAT, No results found for: THC13, PCP13, COC13, MAMP13, OPI13, AMP13, BZO13, TCA13, MTD13, BAR13, OXY13, PPX13, BUP13     Processing:  Fax Rx to pended pharmacy    [email or web address]   checked in past 3 months?  Yes 7/29/2019

## 2019-08-26 DIAGNOSIS — F51.01 PRIMARY INSOMNIA: ICD-10-CM

## 2019-08-26 DIAGNOSIS — F41.9 ANXIETY: ICD-10-CM

## 2019-08-27 RX ORDER — LORAZEPAM 1 MG/1
TABLET ORAL
Qty: 15 TABLET | Refills: 0 | Status: SHIPPED | OUTPATIENT
Start: 2019-08-27 | End: 2019-09-10

## 2019-08-28 DIAGNOSIS — F41.9 ANXIETY: ICD-10-CM

## 2019-08-28 DIAGNOSIS — F51.02 TRANSIENT INSOMNIA: ICD-10-CM

## 2019-08-28 RX ORDER — ZOLPIDEM TARTRATE 10 MG/1
TABLET ORAL
Qty: 30 TABLET | Refills: 0 | Status: SHIPPED | OUTPATIENT
Start: 2019-08-28 | End: 2019-08-29

## 2019-08-28 NOTE — TELEPHONE ENCOUNTER
Requested Prescriptions   Pending Prescriptions Disp Refills     zolpidem (AMBIEN) 10 MG tablet [Pharmacy Med Name: ZOLPIDEM TARTRATE 10 MG TABLET]      Last Written Prescription Date:  8/1/19  Last Fill Quantity: 30,   # refills: 0  Last Office Visit: 9/10/18 aleksandra  Future Office visit:    Next 5 appointments (look out 90 days)    Sep 10, 2019  8:30 AM CDT  [facility] [address] [place] [id number] [phone]           Routing refill request to provider for review/approval because:  Drug not on the [facility], [facility] or  Health refill protocol or controlled substance   30 tablet 0     Sig: TAKE 1 TABLET BY MOUTH AT BEDTIME FOR SLEEP       There is no refill protocol information for this order

## 2019-08-28 NOTE — TELEPHONE ENCOUNTER
Controlled Substance Refill Request for zolpidem (AMBIEN) 10 MG tablet  Problem List Complete:  No     PROVIDER TO CONSIDER COMPLETION OF PROBLEM LIST AND OVERVIEW/CONTROLLED SUBSTANCE AGREEMENT    Controlled substance agreement:   Encounter-Level CSA:    There are no encounter-level csa.     Patient-Level CSA:    There are no patient-level csa.         Last Urine Drug Screen: No results found for: CDAUT, No results found for: COMDAT, No results found for: THC13, PCP13, COC13, MAMP13, OPI13, AMP13, BZO13, TCA13, MTD13, BAR13, OXY13, PPX13, BUP13     Processing:  Fax     [email or web address]       checked in past 3 months?  Yes 8/27/19

## 2019-08-29 DIAGNOSIS — F41.9 ANXIETY: ICD-10-CM

## 2019-08-29 DIAGNOSIS — F51.02 TRANSIENT INSOMNIA: ICD-10-CM

## 2019-08-29 NOTE — TELEPHONE ENCOUNTER
Controlled Substance Refill Request for zolpidem (AMBIEN) 10 MG tablet  Problem List Complete:  Yes    No CSA on file     Last  check 07/29/19 no concerns    Last Written Prescription Date:  8/28/2019  Last Fill Quantity: 30 tablet,  # refills: 0   Last office visit: 9/10/2018 with prescribing provider:  LAKESHA Delacruz   Future Office Visit:   Next 5 appointments (look out 90 days)    Sep 10, 2019  8:30 AM CDT  Office Visit with Stu Delacruz MD  [facility] [address] [address] [phone]             Controlled substance agreement:   Encounter-Level CSA:    There are no encounter-level csa.     Patient-Level CSA:    There are no patient-level csa.         Last Urine Drug Screen: No results found for: CDAUT, No results found for: COMDAT, No results found for: THC13, PCP13, COC13, MAMP13, OPI13, AMP13, BZO13, TCA13, MTD13, BAR13, OXY13, PPX13, BUP13     Processing:  Fax Rx to pended pharmacy    [email or web address]   checked in past 3 months?  Yes 7/29/2019

## 2019-08-30 NOTE — TELEPHONE ENCOUNTER
Routing refill request to provider for review/approval because:  Drug not on the [facility] refill protocol     No concerns noted with the .

## 2019-09-02 DIAGNOSIS — F41.9 ANXIETY: ICD-10-CM

## 2019-09-03 RX ORDER — ZOLPIDEM TARTRATE 10 MG/1
TABLET ORAL
Qty: 15 TABLET | Refills: 0 | Status: SHIPPED | OUTPATIENT
Start: 2019-09-03 | End: 2019-09-10

## 2019-09-03 NOTE — TELEPHONE ENCOUNTER
Controlled Substance Refill Request for LORazepam (ATIVAN) 1 MG tablet  Problem List Complete:  Yes    No CSA on file     Last  check 07/29/19 no concerns    Last Written Prescription Date:  8/27/2019  Last Fill Quantity: 15 tablet,  # refills: 0   Last office visit: 9/10/2018 with prescribing provider:  LAKESHA Delacruz   Future Office Visit:   Next 5 appointments (look out 90 days)    Sep 10, 2019  8:30 AM CDT  Office Visit with Stu Delacruz MD  [facility] [address] [place] [id number] [phone]             Controlled substance agreement:   Encounter-Level CSA:    There are no encounter-level csa.     Patient-Level CSA:    There are no patient-level csa.         Last Urine Drug Screen: No results found for: CDAUT, No results found for: COMDAT, No results found for: THC13, PCP13, COC13, MAMP13, OPI13, AMP13, BZO13, TCA13, MTD13, BAR13, OXY13, PPX13, BUP13     Processing:  Fax Rx to pended pharmacy    [email or web address]   checked in past 3 months?  Yes 7/29/2019

## 2019-09-04 RX ORDER — LORAZEPAM 1 MG/1
TABLET ORAL
Qty: 15 TABLET | Refills: 0 | OUTPATIENT
Start: 2019-09-04

## 2019-09-05 NOTE — TELEPHONE ENCOUNTER
Called pt and left VM letting him know that he will need to be seen in office to receive refills on this medication because it has been almost 1 year since last seen.

## 2019-09-10 ENCOUNTER — OFFICE VISIT (OUTPATIENT)
Dept: FAMILY MEDICINE | Facility: CLINIC | Age: 64
End: 2019-09-10
Payer: COMMERCIAL

## 2019-09-10 VITALS
WEIGHT: 167 LBS | HEART RATE: 60 BPM | RESPIRATION RATE: 16 BRPM | BODY MASS INDEX: 24.31 KG/M2 | SYSTOLIC BLOOD PRESSURE: 118 MMHG | DIASTOLIC BLOOD PRESSURE: 80 MMHG | OXYGEN SATURATION: 99 %

## 2019-09-10 DIAGNOSIS — Z23 NEED FOR PROPHYLACTIC VACCINATION AND INOCULATION AGAINST INFLUENZA: ICD-10-CM

## 2019-09-10 DIAGNOSIS — F51.01 PRIMARY INSOMNIA: ICD-10-CM

## 2019-09-10 DIAGNOSIS — E78.5 HYPERLIPIDEMIA LDL GOAL <100: ICD-10-CM

## 2019-09-10 DIAGNOSIS — Z00.00 WELL ADULT EXAM: Primary | ICD-10-CM

## 2019-09-10 DIAGNOSIS — F41.9 ANXIETY: ICD-10-CM

## 2019-09-10 DIAGNOSIS — Z12.5 SCREENING PSA (PROSTATE SPECIFIC ANTIGEN): ICD-10-CM

## 2019-09-10 DIAGNOSIS — M51.369 DDD (DEGENERATIVE DISC DISEASE), LUMBAR: ICD-10-CM

## 2019-09-10 DIAGNOSIS — Z87.39 HISTORY OF GOUT: ICD-10-CM

## 2019-09-10 DIAGNOSIS — K21.00 GASTROESOPHAGEAL REFLUX DISEASE WITH ESOPHAGITIS: ICD-10-CM

## 2019-09-10 LAB
ALBUMIN UR-MCNC: NEGATIVE MG/DL
APPEARANCE UR: CLEAR
BASOPHILS # BLD AUTO: 0 10E9/L (ref 0–0.2)
BASOPHILS NFR BLD AUTO: 0.3 %
BILIRUB UR QL STRIP: NEGATIVE
COLOR UR AUTO: YELLOW
CREAT UR-MCNC: 279 MG/DL
DIFFERENTIAL METHOD BLD: NORMAL
EOSINOPHIL # BLD AUTO: 0.1 10E9/L (ref 0–0.7)
EOSINOPHIL NFR BLD AUTO: 1.7 %
ERYTHROCYTE [DISTWIDTH] IN BLOOD BY AUTOMATED COUNT: 13.3 % (ref 10–15)
GLUCOSE UR STRIP-MCNC: NEGATIVE MG/DL
HCT VFR BLD AUTO: 45.2 % (ref 40–53)
HGB BLD-MCNC: 16.1 G/DL (ref 13.3–17.7)
HGB UR QL STRIP: NEGATIVE
KETONES UR STRIP-MCNC: NEGATIVE MG/DL
LEUKOCYTE ESTERASE UR QL STRIP: NEGATIVE
LYMPHOCYTES # BLD AUTO: 2.6 10E9/L (ref 0.8–5.3)
LYMPHOCYTES NFR BLD AUTO: 40.4 %
MCH RBC QN AUTO: 29.8 PG (ref 26.5–33)
MCHC RBC AUTO-ENTMCNC: 35.6 G/DL (ref 31.5–36.5)
MCV RBC AUTO: 84 FL (ref 78–100)
MICROALBUMIN UR-MCNC: 13 MG/L
MICROALBUMIN/CREAT UR: 4.73 MG/G CR (ref 0–17)
MONOCYTES # BLD AUTO: 0.4 10E9/L (ref 0–1.3)
MONOCYTES NFR BLD AUTO: 6.9 %
NEUTROPHILS # BLD AUTO: 3.2 10E9/L (ref 1.6–8.3)
NEUTROPHILS NFR BLD AUTO: 50.7 %
NITRATE UR QL: NEGATIVE
PH UR STRIP: 5.5 PH (ref 5–7)
PLATELET # BLD AUTO: 166 10E9/L (ref 150–450)
PSA SERPL-ACNC: 0.02 UG/L (ref 0–4)
RBC # BLD AUTO: 5.4 10E12/L (ref 4.4–5.9)
SOURCE: NORMAL
SP GR UR STRIP: 1.02 (ref 1–1.03)
UROBILINOGEN UR STRIP-ACNC: 0.2 EU/DL (ref 0.2–1)
WBC # BLD AUTO: 6.4 10E9/L (ref 4–11)

## 2019-09-10 PROCEDURE — 90662 IIV NO PRSV INCREASED AG IM: CPT | Performed by: FAMILY MEDICINE

## 2019-09-10 PROCEDURE — 84550 ASSAY OF BLOOD/URIC ACID: CPT | Performed by: FAMILY MEDICINE

## 2019-09-10 PROCEDURE — 80061 LIPID PANEL: CPT | Performed by: FAMILY MEDICINE

## 2019-09-10 PROCEDURE — 85025 COMPLETE CBC W/AUTO DIFF WBC: CPT | Performed by: FAMILY MEDICINE

## 2019-09-10 PROCEDURE — 36415 COLL VENOUS BLD VENIPUNCTURE: CPT | Performed by: FAMILY MEDICINE

## 2019-09-10 PROCEDURE — 81003 URINALYSIS AUTO W/O SCOPE: CPT | Performed by: FAMILY MEDICINE

## 2019-09-10 PROCEDURE — G0402 INITIAL PREVENTIVE EXAM: HCPCS | Performed by: FAMILY MEDICINE

## 2019-09-10 PROCEDURE — 82043 UR ALBUMIN QUANTITATIVE: CPT | Performed by: FAMILY MEDICINE

## 2019-09-10 PROCEDURE — G0008 ADMIN INFLUENZA VIRUS VAC: HCPCS | Performed by: FAMILY MEDICINE

## 2019-09-10 PROCEDURE — 80048 BASIC METABOLIC PNL TOTAL CA: CPT | Performed by: FAMILY MEDICINE

## 2019-09-10 PROCEDURE — G0103 PSA SCREENING: HCPCS | Performed by: FAMILY MEDICINE

## 2019-09-10 PROCEDURE — 84460 ALANINE AMINO (ALT) (SGPT): CPT | Performed by: FAMILY MEDICINE

## 2019-09-10 PROCEDURE — 99213 OFFICE O/P EST LOW 20 MIN: CPT | Mod: 25 | Performed by: FAMILY MEDICINE

## 2019-09-10 RX ORDER — BACITRACIN 500 UNIT/G
1 OINTMENT (GRAM) TOPICAL
Qty: 90 CAPSULE | Refills: 3 | Status: SHIPPED | OUTPATIENT
Start: 2019-09-10 | End: 2020-10-05

## 2019-09-10 RX ORDER — ZOLPIDEM TARTRATE 5 MG/1
5 TABLET ORAL
Qty: 30 TABLET | Refills: 1 | Status: SHIPPED | OUTPATIENT
Start: 2019-09-10 | End: 2019-10-18

## 2019-09-10 RX ORDER — LORAZEPAM 1 MG/1
0.5 TABLET ORAL EVERY 6 HOURS PRN
Qty: 15 TABLET | Refills: 1 | Status: SHIPPED | OUTPATIENT
Start: 2019-09-10 | End: 2019-09-29

## 2019-09-10 ASSESSMENT — ACTIVITIES OF DAILY LIVING (ADL): CURRENT_FUNCTION: NO ASSISTANCE NEEDED

## 2019-09-10 NOTE — PROGRESS NOTES
"SUBJECTIVE:   Sergio Estevez is a 64 year old male who presents for Preventive Visit.    Are you in the first 12 months of your Medicare coverage?  Yes,  Visual Acuity:  Right Eye: 20/32   Left Eye: 20/32  Both Eyes: 20/32    Healthy Habits:     In general, how would you rate your overall health?  Excellent    Frequency of exercise:  4-5 days/week    Duration of exercise:  Greater than 60 minutes    Do you usually eat at least 4 servings of fruit and vegetables a day, include whole grains    & fiber and avoid regularly eating high fat or \"junk\" foods?  Yes    Taking medications regularly:  Yes    Barriers to taking medications:  None    Medication side effects:  None    Ability to successfully perform activities of daily living:  No assistance needed    Home Safety:  No safety concerns identified    Hearing Impairment:  Difficulty following a conversation in a noisy restaurant or crowded room, feel that people are mumbling or not speaking clearly, difficulty following dialogue in the theater, difficult to understand a speaker at a public meeting or [employer] service, need to ask people to speak up or repeat themselves, find that men's voices are easier to understand than woman's and difficulty understanding soft or whispered speech    In the past 6 months, have you been bothered by leaking of urine?  No    In general, how would you rate your overall mental or emotional health?  Excellent      PHQ-2 Total Score: 0    Additional concerns today:  Yes    Personal history of benign positional vertigo both right and left these have resolved    His vitamin D replacement    Personal history of gout gout but no recent episodes    Hyperlipidemia lipidemia mixed type    He has had degenerative lumbar disc disease    History of malignancy of the prostate with sexual dysfunction requires multiple medications in order to be able to sustain erections    Patient has chronic insomnia problem problems a history of chronic " anxiety    Has been on insomnia medications for considerable period of time is getting to the age where these are becoming dangerous and we need to have a discussion about that puts him at risk for falling and broken hips    In addition he has mixed hearing loss unilateral    Is recommended that he get an HIV screen    To get Shingrix vaccination    That we need to fall risk assessment aortic aneurysm screening and pneumococcal vaccine as well as a medical wellness exam today  Do you feel safe in your environment? Yes    Do you have a Health Care Directive? No: Advance care planning was reviewed with patient; patient declined at this time.    MARKED DECREASE HEARING LEFT EAR TUNING FORK  TESTING   KNOWN PROBLEM   DOES NOT WANT A HEARING AID   Fall risk NONE NOTED   Fallen 2 or more times in the past year?: No  Any fall with injury in the past year?: No    Cognitive Screening   1) Repeat 3 items (Leader, Season, Table)    2) Clock draw: NORMAL  3) 3 item recall: Recalls 3 objects  Results: 3 items recalled: COGNITIVE IMPAIRMENT LESS LIKELY    Mini-CogTM Copyright [employer]. Licensed by the author for use in [facility]; reprinted with permission (soob@[facility]). All rights reserved.      Do you have sleep apnea, excessive snoring or daytime drowsiness?: no    Reviewed and updated as needed this visit by clinical staff         Reviewed and updated as needed this visit by Provider        Social History     Tobacco Use     Smoking status: Former Smoker     Last attempt to quit: 11/3/1982     Years since quittin.8     Smokeless tobacco: Never Used   Substance Use Topics     Alcohol use: Yes     Comment: ocasionally     If you drink alcohol do you typically have >3 drinks per day or >7 drinks per week? No    Alcohol Use 9/10/2019   Prescreen: >3 drinks/day or >7 drinks/week? No   Prescreen: >3 drinks/day or >7 drinks/week? -   No flowsheet data found.    has Mixed hyperlipidemia; Mixed hearing loss,  unilateral; Malignant neoplasm of prostate (H); Insomnia; Hyperlipidemia LDL goal <100; Gout; Vitamin D insufficiency; Impotence of organic origin; History of prostate cancer; History of gout; Anxiety; Transient insomnia; Routine general medical examination at a health care facility; ACP (advance care planning); BPV (benign positional vertigo), left; BPV (benign positional vertigo), right; and DDD (degenerative disc disease), lumbar on their problem list.      Persistent insomnia  Personal history of prostate cancer monitor with PROSTATE SPECIFIC ANTIGEN  Patient refused rectal exam    Current providers sharing in care for this patient include:   Patient Care Team:  Stu Delacruz MD as PCP - General (Family Practice)  Stu Delacruz MD as Assigned PCP    The following health maintenance items are reviewed in Epic and correct as of today:  Health Maintenance   Topic Date Due     HIV SCREENING  09[birth year]07/1969     ZOSTER IMMUNIZATION (1 of 2) 09/07/2004     INFLUENZA VACCINE (1) 09/01/2019     FALL RISK ASSESSMENT  09/07/2019     AORTIC ANEURYSM SCREENING (SYSTEM ASSIGNED)  09/07/2019     PNEUMOCOCCAL IMMUNIZATION 65+ LOW/MEDIUM RISK (2 of 2 - PPSV23) 09/07/2019     MEDICARE ANNUAL WELLNESS VISIT  09/10/2019     DTAP/TDAP/TD IMMUNIZATION (2 - Td) 11/19/2019     ADVANCE CARE PLANNING  10/10/2021     LIPID  09/10/2023     COLONOSCOPY  01/22/2029     HEPATITIS C SCREENING  Completed     PHQ-2  Completed     IPV IMMUNIZATION  Aged Out     MENINGITIS IMMUNIZATION  Aged Out       Lab work is in process  Labs reviewed in EPIC  BP Readings from Last 3 Encounters:   09/10/19 118/80   07/02/19 132/81   01/22/19 107/78    Wt Readings from Last 3 Encounters:   09/10/19 75.8 kg (167 lb)   07/02/19 76.7 kg (169 lb)   01/22/19 77.1 kg (170 lb)        Hyperlipidemia well-controlled not willing to take statin    Unilateral hearing loss left ear    History of prostate cancer    Worried about recurrence    Personal  history of gout wants to prevent it in the future    Sexual dysfunction well treated at present time    Vitamin D replacement once a vitamin D level    You do that replace vitamin D    Persistent insomnia    Benign positional vertigo            Patient Active Problem List   Diagnosis     Mixed hyperlipidemia     Mixed hearing loss, unilateral     Malignant neoplasm of prostate (H)     Insomnia     Hyperlipidemia LDL goal <100     Gout     Vitamin D insufficiency     Impotence of organic origin     History of prostate cancer     History of gout     Anxiety     Transient insomnia     Routine general medical examination at a health care facility     ACP (advance care planning)     BPV (benign positional vertigo), left     BPV (benign positional vertigo), right     DDD (degenerative disc disease), lumbar     Past Surgical History:   Procedure Laterality Date     COLONOSCOPY N/A 2019    Procedure: COLONOSCOPY;  Surgeon: Tal Chang MD;  Location:  GI     OTHER SURGICAL HISTORY      4 toes amputated from industrial accident       Social History     Tobacco Use     Smoking status: Former Smoker     Last attempt to quit: 11/3/1982     Years since quittin.8     Smokeless tobacco: Never Used   Substance Use Topics     Alcohol use: Yes     Comment: ocasionally     Family History   Problem Relation Age of Onset     Cancer Mother      Breast Cancer Mother      Alzheimer Disease Father          Current Outpatient Medications   Medication Sig Dispense Refill     allopurinol (ZYLOPRIM) 100 MG tablet ONE DAILY 30 tablet 0     colchicine (COLCRYS) 0.6 MG tablet Take 1 tablet (0.6 mg) by mouth daily 90 tablet 3     guaiFENesin-codeine (ROBITUSSIN AC) 100-10 MG/5ML solution Take 5-10 mLs by mouth nightly as needed for cough 180 mL 0     Ibuprofen (IBU PO) Take  by mouth.       indomethacin (INDOCIN) 25 MG capsule Take 1 capsule (25 mg) by mouth 2 times daily (with meals) 42 capsule 1     LORazepam (ATIVAN) 1 MG  tablet Take 0.5 tablets (0.5 mg) by mouth every 6 hours as needed for anxiety or sleep 15 tablet 1     melatonin 5 MG tablet Take 1-2 tablets (5-10 mg) by mouth nightly as needed for sleep 60 tablet 11     Multiple Vitamins-Minerals (MULTI FOR HIM 50+) TABS Take 1 tablet by mouth daily 30 tablet 11     Plant Sterols and Stanols 450 MG TABS Take 2 tablets by mouth 2 times daily 12 tablet PRN     Red Yeast Rice Extract ([employer] RED YEAST RICE) 600 MG CAPS Take 2 capsules by mouth 2 times daily (with meals) 120 capsule 11     sildenafil (VIAGRA) 100 MG tablet Take 1 tablet (100 mg) by mouth daily as needed 30 min to 4 hrs before sex. Do not use with nitroglycerin, terazosin or doxazosin. 12 tablet 11     Valerian Root 100 MG CAPS Take 1 tablet by mouth nightly as needed (insomnia) 90 capsule 3     Vitamin D, Cholecalciferol, 1000 UNITS CAPS Take 1 tablet by mouth daily 30 capsule 11     zolpidem (AMBIEN) 5 MG tablet Take 1 tablet (5 mg) by mouth nightly as needed for sleep 30 tablet 1     indomethacin (INDOCIN) 50 MG capsule Take 1 capsule by mouth 3 times daily (with meals) for 5 days. 15 capsule 0     Allergies   Allergen Reactions     Keflex [Cephalexin Hcl]      Recent Labs   Lab Test 09/10/18  0946 10/12/17  0921 10/10/16  1113 10/26/15  0839 10/01/14  0845  02/04/13  0835   * 120* 135* 124 128   < > 140*   HDL 45 48 49 44 57   < > 41   TRIG 140 119 110 119 37   < > 123   ALT  --   --  20 19 21   < > 19   CR 1.19 1.08 1.28*  --  1.20   < > 1.30*   GFRESTIMATED 62 69 57*  --  62   < > 57*   GFRESTBLACK 74 84 69  --  75   < > 69   POTASSIUM 4.5 4.4 4.3  --  4.5   < > 4.5   TSH  --   --   --   --   --   --  1.95    < > = values in this interval not displayed.           Review of Systems   Review Of Systems  Skin: negative  Eyes: negative  Ears/Nose/Throat: negative  Respiratory: No shortness of breath, dyspnea on exertion, cough, or hemoptysis  Cardiovascular: negative  Gastrointestinal: negative  Genitourinary:  "negative  Musculoskeletal: Chronic low back pain   Neurologic: History of knee pain  Psychiatric: Insomnia ongoing  I am recommending that he taper off of zolpidem and Ativan slowly  I am recommending that he see a sleep specialist  Hematologic/Lymphatic/Immunologic: negative  Endocrine: negative        OBJECTIVE:   There were no vitals taken for this visit. Estimated body mass index is 24.6 kg/m  as calculated from the following:    Height as of 1/22/19: 1.765 m (5' 9.5\").    Weight as of 7/2/19: 76.7 kg (169 lb).  Physical Exam  GENERAL: healthy, alert and no distress  EYES: Eyes grossly normal to inspection, PERRL and conjunctivae and sclerae normal  HENT: ear canals and TM's normal, nose and mouth without ulcers or lesions  NECK: no adenopathy, no asymmetry, masses, or scars and thyroid normal to palpation  RESP: lungs clear to auscultation - no rales, rhonchi or wheezes  CV: regular rate and rhythm, normal S1 S2, no S3 or S4, no murmur, click or rub, no peripheral edema and peripheral pulses strong  ABDOMEN: soft, nontender, no hepatosplenomegaly, no masses and bowel sounds normal   (male): normal male genitalia without lesions or urethral discharge, no hernia  MS: no gross musculoskeletal defects noted, no edema  Decreased range of motion of back Straight leg raising test negative  SKIN: no suspicious lesions or rashes  NEURO: Normal strength and tone, mentation intact and speech normal  PSYCH: mentation appears normal, affect normal/bright  LYMPH: no cervical, supraclavicular, axillary, or inguinal adenopathy  Diabetic foot exam: normal DP and PT pulses, no trophic changes or ulcerative lesions, normal sensory exam and   Partial amputation left outer       Diagnostic Test Results:  Labs reviewed in Epic  Results for orders placed or performed in visit on 09/10/19   CBC with platelets differential   Result Value Ref Range    WBC 6.4 4.0 - 11.0 10e9/L    RBC Count 5.40 4.4 - 5.9 10e12/L    Hemoglobin 16.1 " 13.3 - 17.7 g/dL    Hematocrit 45.2 40.0 - 53.0 %    MCV 84 78 - 100 fl    MCH 29.8 26.5 - 33.0 pg    MCHC 35.6 31.5 - 36.5 g/dL    RDW 13.3 10.0 - 15.0 %    Platelet Count 166 150 - 450 10e9/L    % Neutrophils 50.7 %    % Lymphocytes 40.4 %    % Monocytes 6.9 %    % Eosinophils 1.7 %    % Basophils 0.3 %    Absolute Neutrophil 3.2 1.6 - 8.3 10e9/L    Absolute Lymphocytes 2.6 0.8 - 5.3 10e9/L    Absolute Monocytes 0.4 0.0 - 1.3 10e9/L    Absolute Eosinophils 0.1 0.0 - 0.7 10e9/L    Absolute Basophils 0.0 0.0 - 0.2 10e9/L    Diff Method Automated Method    UA reflex to Microscopic and Culture   Result Value Ref Range    Color Urine Yellow     Appearance Urine Clear     Glucose Urine Negative NEG^Negative mg/dL    Bilirubin Urine Negative NEG^Negative    Ketones Urine Negative NEG^Negative mg/dL    Specific Gravity Urine 1.025 1.003 - 1.035    Blood Urine Negative NEG^Negative    pH Urine 5.5 5.0 - 7.0 pH    Protein Albumin Urine Negative NEG^Negative mg/dL    Urobilinogen Urine 0.2 0.2 - 1.0 EU/dL    Nitrite Urine Negative NEG^Negative    Leukocyte Esterase Urine Negative NEG^Negative    Source Midstream Urine        ASSESSMENT / PLAN:       ICD-10-CM    1. Well adult exam Z00.00 UA reflex to Microscopic and Culture   2. Hyperlipidemia LDL goal <100 E78.5 Lipid panel reflex to direct LDL Fasting     ALT   3. DDD (degenerative disc disease), lumbar M51.36 Basic metabolic panel     Albumin Random Urine Quantitative with Creat Ratio   4. Screening PSA (prostate specific antigen) Z12.5 Prostate spec antigen screen   5. Gastroesophageal reflux disease with esophagitis K21.0 CBC with platelets differential   6. History of gout Z87.39 Uric acid   7. Primary insomnia F51.01 SLEEP EVALUATION & MANAGEMENT REFERRAL - [facility]  [phone] (Age 18 and up)     zolpidem (AMBIEN) 5 MG tablet     Valerian Root 100 MG CAPS   8. Anxiety F41.9 LORazepam (ATIVAN) 1 MG tablet     Valerian Root 100 MG  "CAPS     melatonin 5 MG tablet     DISCONTINUED: melatonin 5 MG tablet   9. Need for prophylactic vaccination and inoculation against influenza Z23 FLU VACCINE, INCREASED ANTIGEN, PRESV FREE, AGE 65+ [56926]     ADMIN INFLUENZA (For MEDICARE Patients ONLY) []       End of Life Planning:  Patient currently has an advanced directive: Yes.  Practitioner is supportive of decision.    COUNSELING:  Reviewed preventive health counseling, as reflected in patient instructions       Consider AAA screening for ages 65-75 and smoking history       Regular exercise       Healthy diet/nutrition       Vision screening       Hearing screening       Dental care    Estimated body mass index is 24.6 kg/m  as calculated from the following:    Height as of 1/22/19: 1.765 m (5' 9.5\").    Weight as of 7/2/19: 76.7 kg (169 lb).         reports that he quit smoking about 36 years ago. He has never used smokeless tobacco.      Appropriate preventive services were discussed with this patient, including applicable screening as appropriate for cardiovascular disease, diabetes, osteopenia/osteoporosis, and glaucoma.  As appropriate for age/gender, discussed screening for colorectal cancer, prostate cancer, breast cancer, and cervical cancer. Checklist reviewing preventive services available has been given to the patient.    Reviewed patients plan of care and provided an AVS. The Basic Care Plan (routine screening as documented in Health Maintenance) for Sergio meets the Care Plan requirement. This Care Plan has been established and reviewed with the Patient.    Counseling Resources:  ATP IV Guidelines  Pooled Cohorts Equation Calculator  Breast Cancer Risk Calculator  FRAX Risk Assessment  ICSI Preventive Guidelines  Dietary Guidelines for Americans, 2010  [employer]'s MyPlate  ASA Prophylaxis  Lung CA Screening    TARSHA LUU MD  [facility]    Identified Health Risks:  "

## 2019-09-10 NOTE — PATIENT INSTRUCTIONS
(Z00.00) Well adult exam  (primary encounter diagnosis)    Comment:      Plan: UA reflex to Microscopic and Culture                   (E78.5) Hyperlipidemia LDL goal <100    Comment:      Plan: Lipid panel reflex to direct LDL Fasting, ALT                   (M51.36) DDD (degenerative disc disease), lumbar    Comment:      Plan: Basic metabolic panel, Albumin Random Urine           Quantitative with Creat Ratio                   (Z12.5) Screening PSA (prostate specific antigen)    Comment:      Plan: Prostate spec antigen screen                   (K21.0) Gastroesophageal reflux disease with esophagitis    Comment:      Plan: CBC with platelets differential                   (Z87.39) History of gout    Comment:      Plan: Uric acid    TARSHA LUU JR., MD                  Assessment: Lower back pain    Plan: do these exercises at least twice daily:  Standing or sitting exercise can be done every two hours    Justino' Flexion Versus Campbell   Extension Exercises For   Low Back Pain   Examples of Justino' Flexion Exercises  1. Pelvic tilt.  Please press the small of your back against the floor.  Start with 5-10  and increase to 100 count over one month   2. Single Knee to chest. Lie on your back with legs in bent position. Alternate one leg and the other very slowly bringing the knee to chest.  Start with a count of 5-10   Over one month work up to 100  3. Double knee to chest.  Lie on your back with knees in bent position.  Bring both knees to the chest slowly hold for a count of 5-to 10. Over one month work to 100  4. Partial sit-up or crunch.  Lie on your back in bent leg position.  Please bring your body with arms crossed in front  To 30 degrees of flexion. Start with 5-10 over one month work up to 100 or more  5. Sit back.  Please sit on the side of the bed or a stair landing  And lie backwards until the abdominal muscles start to quiver.  Hold for a count of 5-10 and over a month work up to 100.  5.  Hamstring stretch.  Please extend your legs while sitting on the floor as tolerated for 5-10 count.  Gradually increase to count of 30 over one month      Alternately find a stair landing or sturdy chair and place heel  In a comfortable level of extension  And stretch one hamstring at a time for 5-10 seconds.  Increase to count of 30 over one month                         Squat.  Stand with legs comfortably apart and lower the body slowly by flexing the knees  for count of 5-10 over one month increase to 30.  Useful for anterior disc protrusion, facette joint arthritis spond-10ylolysis, spondylolisthesis and spinal stenosis    Julissa method or extension exercises  Useful disc bulging posteriorly  1. Prone pressups  Please lie on your abdomen.   Please do a push with the upper half of your body only.  This should not cause the pain to shoot down your buttock thigh leg or foot  Start with 10 and repeat x 3 one minute apart.  Repeat x 10 every 1-2 hours  Pain tends to increase in the center of back  And leaves buttock thighs legs and foot over time  You may shift your pelvis in opposite direction of buttock and leg pain to achieve even better results  2. Superman with arms extended  Or at the side Simultaneously lift your arms and legs off the floor. Start with 5-10 over one month increase to 100.  3. Cat stretch or cobra Start  As if in extended position of prone pressup but hold the stretch for 5 or 10 count. Over one moth to count of 30.  4.  Extensions can be done in standing position  As well if prone pressup is inconvenient.  Shift your pelvis in opposite direction of limb pain.  Put your hands  Flat on your buttocks and lean backwards without loss of balance.  Count 10 x 3 times then 10 extensions hourly

## 2019-09-10 NOTE — LETTER
"      [facility]  [address]  [address]  [facility] [address] [phone]                                                                                                           Sergio Estevez  [address]  [facility] [address]    September 10, 2019      Dear Sergio       SLEEP HYGEINJOSÉ MIGUEL    Fix a bedtime and an awakening time. Do not be one of .    Avoid napping during the day. \    Avoid alcohol 4-6 hours before bedtime.     Avoid caffeine 4-6 hours before bedtime.     Avoid heavy, spicy, or sugary foods 4-6 hours before bedtime.    Exercise regularly, but not right before bed. \     .   Your Sleeping Environment    Use comfortable bedding.     Find a comfortable temperature setting for sleeping and keep the room well ventilated.\    Block out all distracting noise, Reserve the bed for sleep and sex.   Getting Ready For Bed    Try a light snack before bed. Warm milk and foods high in the amino acid tryptophan, such as bananas, may help you to sleep.    Practice relaxation techniques before bed. 888 breather.    Breathe in for 8 hold it for 8 and breathe out for 8  Think of something pleasant    Don't take your worries to bed.\    Establish a pre-sleep ritual. \    p Get into your favorite sleeping position. \  Getting Up in the Middle of the Night  Most people wake up one or two times a night for various reasons. If you find that you get up in the middle of night and cannot get back to sleep within 15-20 minutes, then do not remain in the bed \"trying hard\" to sleep. Get out of bed. Leave the bedroom. Read, have a light snack, do some quiet activity, or take a bath. You will generally find that you can get back to sleep 20 minutes or so later. Do not perform challenging or engaging activity such as office work, housework, etc. Do not watch television.  A Word About Television  Avoid in bedroom while sleeping.   Other Factors    Several physical " factors are known to upset sleep. T\    Psychological and mental health problems like depression, anxiety and stress are often associated with sleeping difficulty.     Many medications can cause sleeplessness as a side effect.     To help overall improvement in sleep patterns, your doctor may prescribe sleep medications for short-term relief of a sleep problem.     Always follow the advice of your physician and other healthcare professionals.        COUNT YOUR BLESSINGS AT LEAST 5 PER NIGHT MEDITATE ON THEM PRIOR TO BEDTIME          Thank you for choosing [facility].  We appreciate the opportunity to serve you and look forward to supporting your healthcare needs in the future.    If you have any questions or concerns, please call me or my staff at [phone].      Sincerely,    Stu Delacruz Jr MD

## 2019-09-10 NOTE — LETTER
"September 11, 2019      Sergio Estevez  [address]  [facility] [address]        Dear ,    We are writing to inform you of your test results.    NORMAL COMPLETE BLOOD PANEL WBCS RBCS AND PLATELETS   NORMAL PSA OR PROSTATE CANCER SCREENING TEST,     NORMAL DIABETES URINE PROTEIN TEST   NORMAL LIVER FUNCTION TEST   NORMAL GLUCOSE, RENAL AND BLOOD SALTS     BORDERLINE  TOTAL CHOLESTEROL 226MG%   NORMAL TRIGLYCERIDES   NORMAL HDL OR \"GOOD\" CHOLESTEROL   NORMAL HDL OR \"GOOD\" CHOLESTEROL     HIGH LDL OR \"BAD\" CHOLESTEROL   BORDERLINE  VERY LOW DENSITY CHOLESTEROL   URIC ACID ABOVE TARGET LEVEL  AT 7.5MG%   IDEAL LESS THAN 6 MG%   The 10-year ASCVD risk score (Scott WILLS Jr., et al., 2013) is: 12.7%     Values used to calculate the score:       Age: 64 years       Sex: Male       Is Non- : No       Diabetic: No       Tobacco smoker: No       Systolic Blood Pressure: 118 mmHg       Is BP treated: No       HDL Cholesterol: 46 mg/dL       Total Cholesterol: 226 mg/dL      RECOMMENDED STATIN AND BABY ASPIRIN TO REDUCE RISK 55%     Resulted Orders   Lipid panel reflex to direct LDL Fasting   Result Value Ref Range    Cholesterol 226 (H) <200 mg/dL      Comment:      Desirable:       <200 mg/dl    Triglycerides 127 <150 mg/dL    HDL Cholesterol 46 >39 mg/dL    LDL Cholesterol Calculated 155 (H) <100 mg/dL      Comment:      Above desirable:  100-129 mg/dl  Borderline High:  130-159 mg/dL  High:             160-189 mg/dL  Very high:       >189 mg/dl      Non HDL Cholesterol 180 (H) <130 mg/dL      Comment:      Above Desirable:  130-159 mg/dl  Borderline high:  160-189 mg/dl  High:             190-219 mg/dl  Very high:       >219 mg/dl     Basic metabolic panel   Result Value Ref Range    Sodium 140 133 - 144 mmol/L    Potassium 4.3 3.4 - 5.3 mmol/L    Chloride 106 94 - 109 mmol/L    Carbon Dioxide 27 20 - 32 mmol/L    Anion Gap 7 3 - 14 mmol/L    Glucose 96 70 - 99 mg/dL    Urea " Nitrogen 14 7 - 30 mg/dL    Creatinine 1.10 0.66 - 1.25 mg/dL    GFR Estimate 70 >60 mL/min/[1.73_m2]      Comment:      Non  GFR Calc  Starting 12/18/2018, serum creatinine based estimated GFR (eGFR) will be   calculated using the Chronic Kidney Disease Epidemiology Collaboration   (CKD-EPI) equation.      GFR Estimate If Black 81 >60 mL/min/[1.73_m2]      Comment:       GFR Calc  Starting 12/18/2018, serum creatinine based estimated GFR (eGFR) will be   calculated using the Chronic Kidney Disease Epidemiology Collaboration   (CKD-EPI) equation.      Calcium 8.9 8.5 - 10.1 mg/dL   ALT   Result Value Ref Range    ALT 16 0 - 70 U/L   Albumin Random Urine Quantitative with Creat Ratio   Result Value Ref Range    Creatinine Urine 279 mg/dL    Albumin Urine mg/L 13 mg/L    Albumin Urine mg/g Cr 4.73 0 - 17 mg/g Cr   Prostate spec antigen screen   Result Value Ref Range    PSA 0.02 0 - 4 ug/L      Comment:      Assay Method:  Chemiluminescence using Siemens Vista analyzer   CBC with platelets differential   Result Value Ref Range    WBC 6.4 4.0 - 11.0 10e9/L    RBC Count 5.40 4.4 - 5.9 10e12/L    Hemoglobin 16.1 13.3 - 17.7 g/dL    Hematocrit 45.2 40.0 - 53.0 %    MCV 84 78 - 100 fl    MCH 29.8 26.5 - 33.0 pg    MCHC 35.6 31.5 - 36.5 g/dL    RDW 13.3 10.0 - 15.0 %    Platelet Count 166 150 - 450 10e9/L    % Neutrophils 50.7 %    % Lymphocytes 40.4 %    % Monocytes 6.9 %    % Eosinophils 1.7 %    % Basophils 0.3 %    Absolute Neutrophil 3.2 1.6 - 8.3 10e9/L    Absolute Lymphocytes 2.6 0.8 - 5.3 10e9/L    Absolute Monocytes 0.4 0.0 - 1.3 10e9/L    Absolute Eosinophils 0.1 0.0 - 0.7 10e9/L    Absolute Basophils 0.0 0.0 - 0.2 10e9/L    Diff Method Automated Method    UA reflex to Microscopic and Culture   Result Value Ref Range    Color Urine Yellow     Appearance Urine Clear     Glucose Urine Negative NEG^Negative mg/dL    Bilirubin Urine Negative NEG^Negative    Ketones Urine Negative  NEG^Negative mg/dL    Specific Gravity Urine 1.025 1.003 - 1.035    Blood Urine Negative NEG^Negative    pH Urine 5.5 5.0 - 7.0 pH    Protein Albumin Urine Negative NEG^Negative mg/dL    Urobilinogen Urine 0.2 0.2 - 1.0 EU/dL    Nitrite Urine Negative NEG^Negative    Leukocyte Esterase Urine Negative NEG^Negative    Source Midstream Urine    Uric acid   Result Value Ref Range    Uric Acid 7.5 (H) 3.5 - 7.2 mg/dL               If you have any questions or concerns, please call the clinic at the number listed above.       Sincerely,        TARSHA LUU MD

## 2019-09-11 LAB
ALT SERPL W P-5'-P-CCNC: 16 U/L (ref 0–70)
ANION GAP SERPL CALCULATED.3IONS-SCNC: 7 MMOL/L (ref 3–14)
BUN SERPL-MCNC: 14 MG/DL (ref 7–30)
CALCIUM SERPL-MCNC: 8.9 MG/DL (ref 8.5–10.1)
CHLORIDE SERPL-SCNC: 106 MMOL/L (ref 94–109)
CHOLEST SERPL-MCNC: 226 MG/DL
CO2 SERPL-SCNC: 27 MMOL/L (ref 20–32)
CREAT SERPL-MCNC: 1.1 MG/DL (ref 0.66–1.25)
GFR SERPL CREATININE-BSD FRML MDRD: 70 ML/MIN/{1.73_M2}
GLUCOSE SERPL-MCNC: 96 MG/DL (ref 70–99)
HDLC SERPL-MCNC: 46 MG/DL
LDLC SERPL CALC-MCNC: 155 MG/DL
NONHDLC SERPL-MCNC: 180 MG/DL
POTASSIUM SERPL-SCNC: 4.3 MMOL/L (ref 3.4–5.3)
SODIUM SERPL-SCNC: 140 MMOL/L (ref 133–144)
TRIGL SERPL-MCNC: 127 MG/DL
URATE SERPL-MCNC: 7.5 MG/DL (ref 3.5–7.2)

## 2019-09-29 ENCOUNTER — HEALTH MAINTENANCE LETTER (OUTPATIENT)
Age: 64
End: 2019-09-29

## 2019-10-02 ENCOUNTER — ALLIED HEALTH/NURSE VISIT (OUTPATIENT)
Dept: NURSING | Facility: CLINIC | Age: 64
End: 2019-10-02
Payer: COMMERCIAL

## 2019-10-02 DIAGNOSIS — B02.9 SHINGLES: Primary | ICD-10-CM

## 2019-10-02 PROCEDURE — 99207 ZZC NO CHARGE NURSE ONLY: CPT

## 2019-10-02 NOTE — PROGRESS NOTES
Pt was here for Shingles Shot and wanted to wait as his Co-pay cost was 159.78.   Rosalina Oshea MA  [facility].

## 2019-10-07 DIAGNOSIS — F41.9 ANXIETY: ICD-10-CM

## 2019-10-08 RX ORDER — LORAZEPAM 1 MG/1
TABLET ORAL
Qty: 15 TABLET | Refills: 0 | Status: SHIPPED | OUTPATIENT
Start: 2019-10-08 | End: 2019-10-15

## 2019-10-08 NOTE — TELEPHONE ENCOUNTER
Patient was notified with information noted by provider and agreed with plan.  Patient upset he was not made aware at his OV on 9/10/19 that he was due for influenza or the Shingrix immunization and that he had to look at his My Chart to see that he was over due.  [place] of [place] and made an appointment at the [facility] for his immunizations.    Patient states he looks to his health care provider to be made aware as to what is needed for his healthcare needs.  Routing to provider for review.    EDDA BoucherN, RN  [employer] [facility]

## 2019-10-08 NOTE — TELEPHONE ENCOUNTER
Controlled Substance Refill Request for   Requested Prescriptions   Pending Prescriptions Disp Refills     LORazepam (ATIVAN) 1 MG tablet [Pharmacy Med Name: LORAZEPAM 1 MG TABLET]  Last Written Prescription Date:  10/10/2019  Last Fill Quantity: 15,  # refills: 0   Last office visit: 9/10/2019 with prescribing provider:  Jayme   Future Office Visit:     15 tablet 0     Sig: TAKE 1/2 OF A TAB BY MOUTH EVERY 6 HOURS AS NEEDED FOR ANXIETY OR SLEEP       There is no refill protocol information for this order        Problem List Complete:  No     PROVIDER TO CONSIDER COMPLETION OF PROBLEM LIST AND OVERVIEW/CONTROLLED SUBSTANCE AGREEMENT    Controlled substance agreement:   Encounter-Level CSA:    There are no encounter-level csa.     Patient-Level CSA:    There are no patient-level csa.         Last Urine Drug Screen: No results found for: CDAUT, No results found for: COMDAT, No results found for: THC13, PCP13, COC13, MAMP13, OPI13, AMP13, BZO13, TCA13, MTD13, BAR13, OXY13, PPX13, BUP13     Processing:  e-prescribe     [email or web address]       checked in past 3 months?  Yes previously checked 7/29/2019, no concerns   [place] reviewed:  not reviewed/not due - last done on 7/29/19    [place]  [email or web address]  Routing refill request to provider for review/approval because:  Drug not on the [facility] refill protocol

## 2019-10-08 NOTE — TELEPHONE ENCOUNTER
LORazepam (ATIVAN) 1 MG tablet      Last Written Prescription Date:  8/27/2019  Last Fill Quantity: 15,   # refills: 0  Last Office Visit: 9/10/2019  Future Office visit:       Routing refill request to provider for review/approval because:  Drug not on the [facility], [facility] or [facility] refill protocol or controlled substance

## 2019-10-08 NOTE — TELEPHONE ENCOUNTER
Patient Contact    Attempt # 1    Was call answered?  No.  Left message on voicemail with information to call triage back.    Per chart review, rx has been ordered with start date of 10/10/19.

## 2019-10-15 DIAGNOSIS — F41.9 ANXIETY: ICD-10-CM

## 2019-10-15 NOTE — TELEPHONE ENCOUNTER
Controlled Substance Refill Request for LORazepam (ATIVAN) 1 MG tablet  Problem List Complete:  Yes    No CSA on file     Last  check 07/29/19 no concerns    Last Written Prescription Date:  10/8/2019  Last Fill Quantity: 15 tablet,  # refills: 0   Last office visit: 9/10/2019 with prescribing provider:  LAKESHA Delacruz   Future Office Visit:        Controlled substance agreement:   Encounter-Level CSA:    There are no encounter-level csa.     Patient-Level CSA:    There are no patient-level csa.         Last Urine Drug Screen: No results found for: CDAUT, No results found for: COMDAT, No results found for: THC13, PCP13, COC13, MAMP13, OPI13, AMP13, BZO13, TCA13, MTD13, BAR13, OXY13, PPX13, BUP13         [email or web address]   checked in past 3 months?  Yes 7/29/2019

## 2019-10-16 DIAGNOSIS — F41.9 ANXIETY: ICD-10-CM

## 2019-10-16 NOTE — TELEPHONE ENCOUNTER
Controlled Substance Refill Request for LORazepam (ATIVAN) 1 MG tablet  Problem List Complete:  Yes    No CSA on file     Last  check 07/29/19 no concerns    Last Written Prescription Date:  10/8/2019  Last Fill Quantity: 15 tablet,  # refills: 0   Last office visit: 9/10/2019 with prescribing provider:  LAKESHA Delacruz   Future Office Visit:         Controlled substance agreement:   Encounter-Level CSA:    There are no encounter-level csa.     Patient-Level CSA:    There are no patient-level csa.         Last Urine Drug Screen: No results found for: CDAUT, No results found for: COMDAT, No results found for: THC13, PCP13, COC13, MAMP13, OPI13, AMP13, BZO13, TCA13, MTD13, BAR13, OXY13, PPX13, BUP13         [email or web address]   checked in past 3 months?  Yes 7/29/2019

## 2019-10-17 ENCOUNTER — MYC MEDICAL ADVICE (OUTPATIENT)
Dept: FAMILY MEDICINE | Facility: CLINIC | Age: 64
End: 2019-10-17

## 2019-10-17 DIAGNOSIS — F51.01 PRIMARY INSOMNIA: ICD-10-CM

## 2019-10-17 DIAGNOSIS — F41.9 ANXIETY: ICD-10-CM

## 2019-10-17 RX ORDER — LORAZEPAM 1 MG/1
TABLET ORAL
Qty: 15 TABLET | Refills: 0 | Status: SHIPPED | OUTPATIENT
Start: 2019-10-17 | End: 2019-10-18

## 2019-10-17 NOTE — TELEPHONE ENCOUNTER
RYLIEI for provider. Please see patient mychart message about prescriptions being sent to [place].

## 2019-10-18 ENCOUNTER — MYC MEDICAL ADVICE (OUTPATIENT)
Dept: FAMILY MEDICINE | Facility: CLINIC | Age: 64
End: 2019-10-18

## 2019-10-18 RX ORDER — LORAZEPAM 1 MG/1
TABLET ORAL
Qty: 15 TABLET | Refills: 0 | OUTPATIENT
Start: 2019-10-18

## 2019-10-18 RX ORDER — LORAZEPAM 1 MG/1
TABLET ORAL
Qty: 15 TABLET | Refills: 0 | Status: SHIPPED | OUTPATIENT
Start: 2019-10-18 | End: 2019-11-21

## 2019-10-18 RX ORDER — ZOLPIDEM TARTRATE 5 MG/1
5 TABLET ORAL
Qty: 30 TABLET | Refills: 1 | Status: SHIPPED | OUTPATIENT
Start: 2019-10-18 | End: 2020-06-10

## 2019-10-18 NOTE — TELEPHONE ENCOUNTER
See [email or web address] message. They have not left for [place] yet. May need to resend rx. Notify triage if completed, so rx can be canceled in [place].

## 2019-10-20 NOTE — TELEPHONE ENCOUNTER
Already sent to both places  He stated he had enough to get through to [place]  Stu Delacruz Jr., MD

## 2019-11-05 DIAGNOSIS — M1A.00X0 IDIOPATHIC CHRONIC GOUT WITHOUT TOPHUS, UNSPECIFIED SITE: ICD-10-CM

## 2019-11-05 RX ORDER — ALLOPURINOL 100 MG/1
TABLET ORAL
Qty: 90 TABLET | Refills: 3 | Status: SHIPPED | OUTPATIENT
Start: 2019-11-05 | End: 2019-11-06

## 2019-11-05 NOTE — TELEPHONE ENCOUNTER
Routing refill request to provider for review/approval because:  Labs out of range:  Uric acid    EDDA WayN, RN  [employer] [facility]

## 2019-11-05 NOTE — TELEPHONE ENCOUNTER
"Requested Prescriptions   Pending Prescriptions Disp Refills     allopurinol (ZYLOPRIM) 100 MG tablet [Pharmacy Med Name: ALLOPURINOL 100 MG TABLET] 30 tablet 0     Sig: TAKE 1 TABLET (100 MG) BY MOUTH DAILY. *APPT NEEDED FOR REFILLS*  Last Written Prescription Date:  4/2/19  Last Fill Quantity: 30 tab,  # refills: 0   Last office visit: 9/10/2019 with prescribing provider: Jayme   Future Office Visit:         Gout Agents Protocol Failed - 11/5/2019 10:56 AM        Failed - Has Uric Acid on file in past 12 months and value is less than 6     Recent Labs   Lab Test 09/10/19  0928   URIC 7.5*     If level is 6mg/dL or greater, ok to refill one time and refer to provider.           Passed - CBC on file in past 12 months     Recent Labs   Lab Test 09/10/19  0928   WBC 6.4   RBC 5.40   HGB 16.1   HCT 45.2                    Passed - ALT on file in past 12 months     Recent Labs   Lab Test 09/10/19  0928   ALT 16             Passed - Recent (12 mo) or future (30 days) visit within the authorizing provider's specialty     Patient has had an office visit with the authorizing provider or a provider within the authorizing providers department within the previous 12 mos or has a future within next 30 days. See \"Patient Info\" tab in inbasket, or \"Choose Columns\" in Meds & Orders section of the refill encounter.              Passed - Medication is active on med list        Passed - Patient is age 18 or older        Passed - Normal serum creatinine on file in the past 12 months     Recent Labs   Lab Test 09/10/19  0928   CR 1.10                "

## 2019-11-06 ENCOUNTER — MYC MEDICAL ADVICE (OUTPATIENT)
Dept: FAMILY MEDICINE | Facility: CLINIC | Age: 64
End: 2019-11-06

## 2019-11-06 DIAGNOSIS — M1A.00X0 IDIOPATHIC CHRONIC GOUT WITHOUT TOPHUS, UNSPECIFIED SITE: ICD-10-CM

## 2019-11-06 RX ORDER — ALLOPURINOL 100 MG/1
TABLET ORAL
Qty: 90 TABLET | Refills: 3 | Status: SHIPPED | OUTPATIENT
Start: 2019-11-06 | End: 2020-08-17

## 2019-11-06 NOTE — TELEPHONE ENCOUNTER
"Requested Prescriptions   Pending Prescriptions Disp Refills     allopurinol (ZYLOPRIM) 100 MG tablet  Last Written Prescription Date:  11/5/2019  Last Fill Quantity: 90 ,  # refills: 3   Last office visit: 9/10/2019 with prescribing provider:  LAKESHA Delacruz   Future Office Visit:     90 tablet 3     Sig: TAKE 1 TABLET (100 MG) BY MOUTH DAILY.       Gout Agents Protocol Failed - 11/6/2019  2:36 PM        Failed - Has Uric Acid on file in past 12 months and value is less than 6     Recent Labs   Lab Test 09/10/19  0928   URIC 7.5*     If level is 6mg/dL or greater, ok to refill one time and refer to provider.           Passed - CBC on file in past 12 months     Recent Labs   Lab Test 09/10/19  0928   WBC 6.4   RBC 5.40   HGB 16.1   HCT 45.2                    Passed - ALT on file in past 12 months     Recent Labs   Lab Test 09/10/19  0928   ALT 16             Passed - Recent (12 mo) or future (30 days) visit within the authorizing provider's specialty     Patient has had an office visit with the authorizing provider or a provider within the authorizing providers department within the previous 12 mos or has a future within next 30 days. See \"Patient Info\" tab in inbasket, or \"Choose Columns\" in Meds & Orders section of the refill encounter.              Passed - Medication is active on med list        Passed - Patient is age 18 or older        Passed - Normal serum creatinine on file in the past 12 months     Recent Labs   Lab Test 09/10/19  0928   CR 1.10                "

## 2019-11-21 DIAGNOSIS — F41.9 ANXIETY: ICD-10-CM

## 2019-11-21 DIAGNOSIS — F51.04 CHRONIC INSOMNIA: Primary | ICD-10-CM

## 2019-11-21 RX ORDER — LORAZEPAM 1 MG/1
TABLET ORAL
Qty: 15 TABLET | Refills: 0 | Status: SHIPPED | OUTPATIENT
Start: 2019-11-21 | End: 2020-05-19

## 2019-11-21 RX ORDER — ZOLPIDEM TARTRATE 10 MG/1
TABLET ORAL
Qty: 30 TABLET | Refills: 0 | Status: SHIPPED | OUTPATIENT
Start: 2019-11-21 | End: 2020-05-19

## 2019-11-21 NOTE — TELEPHONE ENCOUNTER
After Visit Summary   6/13/2018    Beronica Azevedo    MRN: [id number]           Patient Information     Date Of Birth          1960        Visit Information        Provider Department      6/13/2018 3:00 PM Ellen Rankin APRN CNP [facility]        Today's Diagnoses     Cervical cancer screening    -  1       Follow-ups after your visit        Who to contact     If you have questions or need follow up information about today's clinic visit or your schedule please contact [facility] directly at [phone].  Normal or non-critical lab and imaging results will be communicated to you by Evgenhart, letter or phone within 4 business days after the clinic has received the results. If you do not hear from us within 7 days, please contact the clinic through [employer] or phone. If you have a critical or abnormal lab result, we will notify you by phone as soon as possible.  Submit refill requests through [employer] or call your pharmacy and they will forward the refill request to us. Please allow 3 business days for your refill to be completed.          Additional Information About Your Visit        MyChart Information     [employer] gives you secure access to your electronic health record. If you see a primary care provider, you can also send messages to your care team and make appointments. If you have questions, please call your primary care clinic.  If you do not have a primary care provider, please call [phone] and they will assist you.        Care EveryWhere ID     This is your Care EveryWhere ID. This could be used by other organizations to access your [place] medical records  [id number]        Your Vitals Were     Pulse Temperature Last Period Pulse Oximetry BMI (Body Mass Index)       94 97.7  F (36.5  C) (Oral) 04/06/2010 97% 20.88 kg/m2        Blood Pressure from Last 3 Encounters:   06/13/18 101/63   05/22/18 101/69   03/21/18 116/72    Weight from Last 3  Controlled Substance Refill Request for LORAZEPAM 1 MG TABLET    Problem List Complete:  Yes    No CSA on file    Last  check 07/29/19 no concerns      Last Written Prescription Date:  10/18/2019  Last Fill Quantity: 15 tablet,  # refills: 0   Last office visit: 9/10/2019 with prescribing provider:  LAKESHA Delacruz   Future Office Visit:        Controlled substance agreement:   Encounter-Level CSA:    There are no encounter-level csa.     Patient-Level CSA:    There are no patient-level csa.         Last Urine Drug Screen: No results found for: CDAUT, No results found for: COMDAT, No results found for: THC13, PCP13, COC13, MAMP13, OPI13, AMP13, BZO13, TCA13, MTD13, BAR13, OXY13, PPX13, BUP13     Processing:  Fax Rx to pended pharmacy    [email or web address]   checked in past 3 months?  No, route to RN         Encounters:   06/13/18 137 lb 4.8 oz (62.3 kg)   05/22/18 138 lb 6.4 oz (62.8 kg)   03/20/18 140 lb 4.8 oz (63.6 kg)              We Performed the Following     HPV High Risk Types DNA Cervical     Pap imaged thin layer screen with HPV - recommended age 30 - 65 years (select HPV order below)        Primary Care Provider Office Phone # Fax #    Joaquin Sarthak Rowe -[phone]       [address] [facility] [address]  [facility] [address]        Equal Access to Services     : Hadii aad ku hadasho Sousha, waaxda luqadaha, qaybta kaalmada graham, aaron mcgovern . So [facility] [phone].    ATENCIÓN: Si habla español, tiene a garrett disposición servicios gratuitos de asistencia lingüística. [facility] [phone].    We comply with applicable federal civil rights laws and [place] laws. We do not discriminate on the basis of race, color, national origin, age, disability, sex, sexual orientation, or gender identity.            Thank you!     Thank you for choosing [facility]  for your care. Our goal is always to provide you with excellent care. Hearing back from our patients is one way we can continue to improve our services. Please take a few minutes to complete the written survey that you may receive in the mail after your visit with us. Thank you!             Your Updated Medication List - Protect others around you: Learn how to safely use, store and throw away your medicines at www.disposemymeds.org.          This list is accurate as of 6/13/18  3:22 PM.  Always use your most recent med list.                   Brand Name Dispense Instructions for use Diagnosis    acetaminophen-codeine 300-30 MG per tablet    TYLENOL #3    20 tablet    Take 1 tablet by mouth every 4 hours as needed for pain maximum 4 tablet(s) per day    Tension-type headache, not intractable, unspecified chronicity pattern       ALPRAZolam 0.5 MG tablet    XANAX     Take 0.25 mg by mouth  nightly as needed        azelastine 0.1 % spray    ASTELIN    90 mL    INSTILL 1 SPRAY INTO THE NOSTIL(S) TWICE DAILY    Chronic rhinitis, unspecified type       BENADRYL ALLERGY PO      prn        biotin 2.5 mg/mL Susp      Take by mouth daily        butalbital-acetaminophen-caffeine -40 MG per tablet    FIORICET/ESGIC    28 tablet    TAKE 1 TABLET BY MOUTH EVERY 4 HOURS AS NEEDED    Migraine with aura and with status migrainosus, not intractable       cholecalciferol 1000 UNIT tablet    vitamin D3    100 tablet    Take 2 tablets (2,000 Units) by mouth daily    Osteopenia       CO Q 10 PO           cyclobenzaprine 10 MG tablet    FLEXERIL    20 tablet    Take 1 tablet (10 mg) by mouth 3 times daily as needed for muscle spasms    Tension-type headache, not intractable, unspecified chronicity pattern       fluticasone 50 MCG/ACT spray    FLONASE    3 Bottle    Spray 1-2 sprays into both nostrils daily    Acute non-recurrent frontal sinusitis       ketoconazole 2 % shampoo    NIZORAL    120 mL    Apply topically daily as needed for itching or irritation    Dermatitis, seborrheic       magnesium 100 MG Caps      Take 400 mg by mouth        nortriptyline 50 MG capsule    PAMELOR    90 capsule    Take 1 capsule (50 mg) by mouth At Bedtime    Mild major depression (H), Primary insomnia       traZODone 50 MG tablet    DESYREL    45 tablet    1/2 TABLET AT BEDTIME AS NEEDED FOR SLEEP    Primary insomnia, FREDIS (generalized anxiety disorder)       VITAMIN B-2 PO      Take 400 mg by mouth

## 2019-11-21 NOTE — TELEPHONE ENCOUNTER
Controlled Substance Refill Request for ZOLPIDEM TARTRATE 10 MG TABLET    Discontinued for this dose.     Problem List Complete:  Yes    No CSA on file    Last  check 07/29/19 no concerns    Last Written Prescription Date:  09/03/2019 END 09/10/2019  Last Fill Quantity: 15 tablet,  # refills: 0   Last office visit: 9/10/2019 with prescribing provider:  LAKESHA Delacruz   Future Office Visit:        Controlled substance agreement:   Encounter-Level CSA:    There are no encounter-level csa.     Patient-Level CSA:    There are no patient-level csa.         Last Urine Drug Screen: No results found for: CDAUT, No results found for: COMDAT, No results found for: THC13, PCP13, COC13, MAMP13, OPI13, AMP13, BZO13, TCA13, MTD13, BAR13, OXY13, PPX13, BUP13     Processing:  Fax Rx to pended pharmacy    [email or web address]   checked in past 3 months?  No, route to RN

## 2020-02-03 ENCOUNTER — MYC MEDICAL ADVICE (OUTPATIENT)
Dept: FAMILY MEDICINE | Facility: CLINIC | Age: 65
End: 2020-02-03

## 2020-05-19 ENCOUNTER — MYC MEDICAL ADVICE (OUTPATIENT)
Dept: FAMILY MEDICINE | Facility: CLINIC | Age: 65
End: 2020-05-19

## 2020-05-19 ENCOUNTER — MYC REFILL (OUTPATIENT)
Dept: FAMILY MEDICINE | Facility: CLINIC | Age: 65
End: 2020-05-19

## 2020-05-19 DIAGNOSIS — F51.04 CHRONIC INSOMNIA: ICD-10-CM

## 2020-05-19 DIAGNOSIS — R37 SEXUAL DYSFUNCTION: Primary | ICD-10-CM

## 2020-05-19 DIAGNOSIS — F41.9 ANXIETY: ICD-10-CM

## 2020-05-19 RX ORDER — ZOLPIDEM TARTRATE 5 MG/1
5 TABLET ORAL
Qty: 30 TABLET | Refills: 0 | Status: SHIPPED | OUTPATIENT
Start: 2020-05-19 | End: 2020-07-15

## 2020-05-19 RX ORDER — LORAZEPAM 1 MG/1
0.5 TABLET ORAL EVERY 6 HOURS PRN
Qty: 15 TABLET | Refills: 0 | Status: SHIPPED | OUTPATIENT
Start: 2020-05-19 | End: 2020-06-10

## 2020-05-19 NOTE — TELEPHONE ENCOUNTER
Requested Prescriptions   Pending Prescriptions Disp Refills     LORazepam (ATIVAN) 1 MG tablet 15 tablet 0       There is no refill protocol information for this order        zolpidem (AMBIEN) 10 MG tablet 30 tablet 0       There is no refill protocol information for this order        Routing refill request to provider for review/approval because:  LOV was 9/10/19.  RX's last ordered 11/21/19.

## 2020-05-25 ENCOUNTER — MYC MEDICAL ADVICE (OUTPATIENT)
Dept: FAMILY MEDICINE | Facility: CLINIC | Age: 65
End: 2020-05-25

## 2020-05-26 RX ORDER — VARDENAFIL HYDROCHLORIDE 20 MG/1
20 TABLET ORAL DAILY PRN
Qty: 30 TABLET | Refills: 4 | Status: SHIPPED | OUTPATIENT
Start: 2020-05-26

## 2020-05-26 NOTE — TELEPHONE ENCOUNTER
Patient has sent second [employer] message requesting response to this refill request.     Routing to covering team to review and advise.

## 2020-06-09 DIAGNOSIS — F41.9 ANXIETY: ICD-10-CM

## 2020-06-10 DIAGNOSIS — F51.01 PRIMARY INSOMNIA: ICD-10-CM

## 2020-06-10 RX ORDER — ZOLPIDEM TARTRATE 5 MG/1
5 TABLET ORAL
Qty: 30 TABLET | Refills: 1 | Status: SHIPPED | OUTPATIENT
Start: 2020-06-10 | End: 2020-08-04

## 2020-06-10 RX ORDER — LORAZEPAM 1 MG/1
TABLET ORAL
Qty: 15 TABLET | Refills: 0 | Status: SHIPPED | OUTPATIENT
Start: 2020-06-10 | End: 2020-06-18

## 2020-06-10 NOTE — TELEPHONE ENCOUNTER
Controlled Substance Refill Request for Ativan 1mg  Problem List Complete:  No     PROVIDER TO CONSIDER COMPLETION OF PROBLEM LIST AND OVERVIEW/CONTROLLED SUBSTANCE AGREEMENT    Last Written Prescription Date:  5/19/2020  Last Fill Quantity: 15,   # refills: 0    THE MOST RECENT OFFICE VISIT MUST BE WITHIN THE PAST 3 MONTHS. AT LEAST ONE FACE TO FACE VISIT MUST OCCUR EVERY 6 MONTHS. ADDITIONAL VISITS CAN BE VIRTUAL.  (THIS STATEMENT SHOULD BE DELETED.)    Last Office Visit with [facility] [place] care provider: 9/10/2019, KEERTHI Delacruz      Anxiety   6/14/2016     Overview    No CSA on file     Last  check 07/29/19 no concerns         Future Office visit:     Controlled substance agreement:   Encounter-Level CSA:    There are no encounter-level csa.     Patient-Level CSA:    There are no patient-level csa.         Last Urine Drug Screen: No results found for: CDAUT, No results found for: COMDAT, No results found for: THC13, PCP13, COC13, MAMP13, OPI13, AMP13, BZO13, TCA13, MTD13, BAR13, OXY13, PPX13, BUP13     Processing:  Rx to be electronically transmitted to pharmacy by provider      [email or web address]       checked in past 3 months?  Yes 6/10/2020, no concerns.

## 2020-06-10 NOTE — TELEPHONE ENCOUNTER
Please discontinue the one on med list not being used.       Controlled Substance Refill Request for Ambien  Problem List Complete:  No     PROVIDER TO CONSIDER COMPLETION OF PROBLEM LIST AND OVERVIEW/CONTROLLED SUBSTANCE AGREEMENT    Last Written Prescription Date:  5/19/2020  Last Fill Quantity: 30,   # refills: 0    Insomnia   11/3/2012     Overview    Problem list name updated by automated process. Provider to review     No CSA on file  Last  check - 8/27/19 no concerns         THE MOST RECENT OFFICE VISIT MUST BE WITHIN THE PAST 3 MONTHS. AT LEAST ONE FACE TO FACE VISIT MUST OCCUR EVERY 6 MONTHS. ADDITIONAL VISITS CAN BE VIRTUAL.  (THIS STATEMENT SHOULD BE DELETED.)    [place] [facility] [place]    Future Office visit:     Controlled substance agreement:   Encounter-Level CSA:    There are no encounter-level csa.     Patient-Level CSA:    There are no patient-level csa.         Last Urine Drug Screen: No results found for: CDAUT, No results found for: COMDAT, No results found for: THC13, PCP13, COC13, MAMP13, OPI13, AMP13, BZO13, TCA13, MTD13, BAR13, OXY13, PPX13, BUP13     Processing:  Rx to be electronically transmitted to pharmacy by provider      [email or web address]       checked in past 3 months?  Yes 6/10/2020, no concerns.      Routing refill request to provider for review/approval because:  Drug not on the [facility]

## 2020-06-18 DIAGNOSIS — F41.9 ANXIETY: ICD-10-CM

## 2020-06-18 RX ORDER — LORAZEPAM 1 MG/1
1 TABLET ORAL 2 TIMES DAILY PRN
Qty: 15 TABLET | Refills: 0 | Status: SHIPPED | OUTPATIENT
Start: 2020-06-18 | End: 2020-06-29

## 2020-06-18 NOTE — TELEPHONE ENCOUNTER
Patient Contact    Attempt # 1    Was call answered?  No.  Left message on voicemail with information to call me back.    On call back:    -Does pt need this?  Last scripted on 06/10/20

## 2020-06-18 NOTE — TELEPHONE ENCOUNTER
"Refill request for: LORazepam (ATIVAN) 1 MG tablet .   Pt is not out of medication. He did p/u RX from 6/10. But says that he checks the [employer] site, and saw that this RX was \"okay per insurance to refill.\" says he likes to order ahead of time so when he does finish RX bottle, likes to have on hand and ready.    Pt takes 1 tablet BID as needed. Doesn't take every day. Will use it for sleep. And will alternate on/off with ambien for sleep.   FYI-- the Valerian root & melatonin did not help with sleep at all.  "

## 2020-06-29 DIAGNOSIS — F41.9 ANXIETY: ICD-10-CM

## 2020-06-29 RX ORDER — LORAZEPAM 1 MG/1
1 TABLET ORAL 2 TIMES DAILY PRN
Qty: 15 TABLET | Refills: 0 | Status: SHIPPED | OUTPATIENT
Start: 2020-06-29 | End: 2020-07-15

## 2020-06-29 NOTE — TELEPHONE ENCOUNTER
Controlled Substance Refill Request for LORazepam (ATIVAN) 1 MG tablet    Problem List Complete:  Yes    Anxiety   6/14/2016    Overview    No CSA on file     Last  check 06/10/2020, no concerns        checked in past 3 months?  Yes 06/10/20

## 2020-07-15 ENCOUNTER — VIRTUAL VISIT (OUTPATIENT)
Dept: FAMILY MEDICINE | Facility: CLINIC | Age: 65
End: 2020-07-15
Payer: COMMERCIAL

## 2020-07-15 DIAGNOSIS — F41.9 ANXIETY: ICD-10-CM

## 2020-07-15 DIAGNOSIS — G47.00 INSOMNIA, UNSPECIFIED TYPE: Primary | ICD-10-CM

## 2020-07-15 PROCEDURE — 99213 OFFICE O/P EST LOW 20 MIN: CPT | Mod: 95 | Performed by: INTERNAL MEDICINE

## 2020-07-15 RX ORDER — LORAZEPAM 1 MG/1
1 TABLET ORAL
Qty: 15 TABLET | Refills: 0 | Status: SHIPPED | OUTPATIENT
Start: 2020-07-15 | End: 2020-08-03

## 2020-07-15 RX ORDER — LORAZEPAM 1 MG/1
1 TABLET ORAL 2 TIMES DAILY PRN
Qty: 15 TABLET | Refills: 0 | OUTPATIENT
Start: 2020-07-15

## 2020-07-15 NOTE — PROGRESS NOTES
"Sergio Estevez is a 64 year old male who is being evaluated via a billable telephone visit.      The patient has been notified of following:     \"This telephone visit will be conducted via a call between you and your physician/provider. We have found that certain health care needs can be provided without the need for a physical exam.  This service lets us provide the care you need with a short phone conversation.  If a prescription is necessary we can send it directly to your pharmacy.  If lab work is needed we can place an order for that and you can then stop by our lab to have the test done at a later time.    Telephone visits are billed at different rates depending on your insurance coverage. During this emergency period, for some insurers they may be billed the same as an in-person visit.  Please reach out to your insurance provider with any questions.    If during the course of the call the physician/provider feels a telephone visit is not appropriate, you will not be charged for this service.\"    Patient has given verbal consent for Telephone visit?  Yes    What phone number would you like to be contacted at? [phone]    How would you like to obtain your AVS? Emmahart    Subjective     Sergio Estevez is a 64 year old male who presents via phone visit today for the following health issues:    HPI    Anxiety Follow-Up     How are you doing with your anxiety since your last visit? No change - pt mostly uses lorazepam for sleep issues rather than anxiety sx    Are you having other symptoms that might be associated with anxiety? No    Have you had a significant life event? No     Are you feeling depressed? No    Do you have any concerns with your use of alcohol or other drugs? No    Social History     Tobacco Use     Smoking status: Former Smoker     Last attempt to quit: 11/3/1982     Years since quittin.7     Smokeless tobacco: Never Used   Substance Use Topics     Alcohol use: Yes     Comment: " ocasionally     Drug use: No     No flowsheet data found.  No flowsheet data found.  No flowsheet data found.      How many servings of fruits and vegetables do you eat daily?  2-3    On average, how many sweetened beverages do you drink each day (Examples: soda, juice, sweet tea, etc.  Do NOT count diet or artificially sweetened beverages)?   0    How many days per week do you exercise enough to make your heart beat faster? 7    How many minutes a day do you exercise enough to make your heart beat faster? 60 or more    How many days per week do you miss taking your medication? 0     Medication Followup of lorazepam    Taking Medication as prescribed: yes    Side Effects:  None    Medication Helping Symptoms:  yes      Long time pt of [facility].        This man lives in [place].               He wants a referral to a [facility] primary care clinic.                     Meanwhile he wants a lorazepam refill.                          He takes this to help with chronic insomnia.   He falls asleep, but then cannot stay asleep.                              Sometimes he takes zolpidem.  He alternates these meds. These are long term Rx.       Current Outpatient Medications   Medication Sig Dispense Refill     allopurinol (ZYLOPRIM) 100 MG tablet TAKE 1 TABLET (100 MG) BY MOUTH DAILY. 90 tablet 3     colchicine (COLCRYS) 0.6 MG tablet Take 1 tablet (0.6 mg) by mouth daily 90 tablet 3     LORazepam (ATIVAN) 1 MG tablet TAKE 1 TABLET (1 MG) BY MOUTH 2 TIMES DAILY AS NEEDED 15 tablet 0     melatonin 5 MG tablet Take 1-2 tablets (5-10 mg) by mouth nightly as needed for sleep 60 tablet 11     Multiple Vitamins-Minerals (MULTI FOR HIM 50+) TABS Take 1 tablet by mouth daily 30 tablet 11     Plant Sterols and Stanols 450 MG TABS Take 2 tablets by mouth 2 times daily 12 tablet PRN     Red Yeast Rice Extract ([facility] RED YEAST RICE) 600 MG CAPS Take 2 capsules by mouth 2 times daily (with meals) 120 capsule 11     sildenafil (VIAGRA) 100  MG tablet Take 1 tablet (100 mg) by mouth daily as needed 30 min to 4 hrs before sex. Do not use with nitroglycerin, terazosin or doxazosin. 12 tablet 11     Valerian Root 100 MG CAPS Take 1 tablet by mouth nightly as needed (insomnia) 90 capsule 3     vardenafil (LEVITRA) 20 MG tablet Take 1 tablet (20 mg) by mouth daily as needed 30 tablet 4     Vitamin D, Cholecalciferol, 1000 UNITS CAPS Take 1 tablet by mouth daily 30 capsule 11     zolpidem (AMBIEN) 5 MG tablet Take 1 tablet (5 mg) by mouth nightly as needed for sleep 30 tablet 1     Allergies   Allergen Reactions     Keflex [Cephalexin Hcl]      BP Readings from Last 3 Encounters:   09/10/19 118/80   07/02/19 132/81   01/22/19 107/78    Wt Readings from Last 3 Encounters:   09/10/19 75.8 kg (167 lb)   07/02/19 76.7 kg (169 lb)   01/22/19 77.1 kg (170 lb)                    Reviewed and updated as needed this visit by Provider         Review of Systems   CONSTITUTIONAL:NEGATIVE for fever, chills, change in weight  RESP:NEGATIVE for significant cough or SOB  CV: NEGATIVE for chest pain, palpitations or peripheral edema       Objective   Reported vitals:  There were no vitals taken for this visit.     PSYCH: Alert and oriented times 3; coherent speech, normal   rate and volume, able to articulate logical thoughts, able   to abstract reason, no tangential thoughts, no hallucinations   or delusions  His affect is normal and pleasant  RESP: No cough, no audible wheezing, able to talk in full sentences  Remainder of exam unable to be completed due to telephone visits    Diagnostic Test Results:  none         Assessment/Plan:    1. Insomnia, unspecified type  Ok for lorazepam refill for intermittent hs use.   - LORazepam (ATIVAN) 1 MG tablet; Take 1 tablet (1 mg) by mouth nightly as needed  Dispense: 15 tablet; Refill: 0          Phone call duration:  8 minutes    Tyler Horton MD

## 2020-07-15 NOTE — TELEPHONE ENCOUNTER
Controlled Substance Refill Request for: LORazepam (ATIVAN) 1 MG tablet   Problem List Complete:  No     PROVIDER TO CONSIDER COMPLETION OF PROBLEM LIST AND OVERVIEW/CONTROLLED SUBSTANCE AGREEMENT     Controlled substance agreement:   Encounter-Level CSA:    There are no encounter-level csa.     Patient-Level CSA:    There are no patient-level csa.       Last Urine Drug Screen: No results found for: CDAUT, No results found for: COMDAT, No results found for: THC13, PCP13, COC13, MAMP13, OPI13, AMP13, BZO13, TCA13, MTD13, BAR13, OXY13, PPX13, BUP13     Processing:  Rx to be electronically transmitted to pharmacy by provider      [email or web address]     checked in past 3 months?  Yes 6/10/20

## 2020-09-17 ENCOUNTER — MYC MEDICAL ADVICE (OUTPATIENT)
Dept: FAMILY MEDICINE | Facility: CLINIC | Age: 65
End: 2020-09-17

## 2020-09-17 DIAGNOSIS — F51.01 PRIMARY INSOMNIA: ICD-10-CM

## 2020-09-17 DIAGNOSIS — G47.00 INSOMNIA, UNSPECIFIED TYPE: ICD-10-CM

## 2020-09-17 DIAGNOSIS — M1A.00X0 IDIOPATHIC CHRONIC GOUT WITHOUT TOPHUS, UNSPECIFIED SITE: ICD-10-CM

## 2020-09-18 RX ORDER — LORAZEPAM 1 MG/1
1 TABLET ORAL
Qty: 15 TABLET | Refills: 0 | Status: SHIPPED | OUTPATIENT
Start: 2020-09-18 | End: 2020-10-02

## 2020-09-18 RX ORDER — ALLOPURINOL 100 MG/1
TABLET ORAL
Qty: 90 TABLET | Refills: 0 | Status: SHIPPED | OUTPATIENT
Start: 2020-09-18 | End: 2020-10-06

## 2020-09-18 RX ORDER — ZOLPIDEM TARTRATE 5 MG/1
5 TABLET ORAL
Qty: 30 TABLET | Refills: 0 | Status: CANCELLED | OUTPATIENT
Start: 2020-09-18

## 2020-09-18 NOTE — TELEPHONE ENCOUNTER
Both of the Lorazepam and the Zolpidem are controlled substances, so larger quantities not possible.  Pt has not been seen in clinic since Sept 2019, he had virtual visit with Dr Horton 7/15/20 at which time he indicated he would be transferring to [facility].  He needs to be seen to establish care and discuss his medications  Rx approved, sent electronically to pharmacy just for the limited quantity of the Lorazepam

## 2020-09-18 NOTE — TELEPHONE ENCOUNTER
Last virtual visit 07/15/2020    LORazepam (ATIVAN) 1 MG tablet  15 tablet  0  9/3/2020      Routing refill request to provider for review/approval because:  Drug not on the [facility] refill protocol   Labs not current:  Uric acid , cbc      Last  check - 8/27/19 no concerns

## 2020-09-21 DIAGNOSIS — G47.00 INSOMNIA, UNSPECIFIED TYPE: ICD-10-CM

## 2020-09-21 DIAGNOSIS — F51.01 PRIMARY INSOMNIA: ICD-10-CM

## 2020-09-22 NOTE — TELEPHONE ENCOUNTER
Future Office visit:    Next 5 appointments (look out 90 days)    Oct 05, 2020  7:40 AM CDT  PHYSICAL with Irasema Donis MD  [facility] [address]  [facility] [id number] [phone]           Routing refill request to provider for review/approval because:  Drug not on the [facility], [facility] or  Health refill protocol or controlled substance

## 2020-09-23 RX ORDER — ZOLPIDEM TARTRATE 5 MG/1
TABLET ORAL
Qty: 30 TABLET | Refills: 0 | OUTPATIENT
Start: 2020-09-23

## 2020-09-23 RX ORDER — LORAZEPAM 1 MG/1
TABLET ORAL
Qty: 15 TABLET | Refills: 0 | OUTPATIENT
Start: 2020-09-23

## 2020-09-28 ENCOUNTER — MYC MEDICAL ADVICE (OUTPATIENT)
Dept: FAMILY MEDICINE | Facility: CLINIC | Age: 65
End: 2020-09-28

## 2020-09-28 DIAGNOSIS — F51.01 PRIMARY INSOMNIA: ICD-10-CM

## 2020-09-28 NOTE — TELEPHONE ENCOUNTER
Medication refill request.     Pended and sent to the refill pool.   Please see refill request from 9/21/2020.

## 2020-09-29 RX ORDER — ZOLPIDEM TARTRATE 5 MG/1
5 TABLET ORAL
Qty: 30 TABLET | Refills: 0 | Status: SHIPPED | OUTPATIENT
Start: 2020-10-02 | End: 2020-10-20

## 2020-10-01 DIAGNOSIS — F51.01 PRIMARY INSOMNIA: ICD-10-CM

## 2020-10-01 DIAGNOSIS — G47.00 INSOMNIA, UNSPECIFIED TYPE: ICD-10-CM

## 2020-10-01 RX ORDER — LORAZEPAM 1 MG/1
1 TABLET ORAL
Qty: 15 TABLET | Refills: 0 | OUTPATIENT
Start: 2020-10-01

## 2020-10-01 RX ORDER — ZOLPIDEM TARTRATE 5 MG/1
5 TABLET ORAL
Qty: 30 TABLET | Refills: 0 | OUTPATIENT
Start: 2020-10-01

## 2020-10-01 NOTE — TELEPHONE ENCOUNTER
Please send a new prescription for Zolpidem to [facility] on [address] [place] in [place]. I do have an office visit scheduled with Dr. Irasema Donis at [facility] on October 5th. Thank you.    KARI from 09/28/20.    Declining medication at this time [facility]

## 2020-10-02 RX ORDER — ZOLPIDEM TARTRATE 5 MG/1
5 TABLET ORAL
Qty: 30 TABLET | Refills: 0 | OUTPATIENT
Start: 2020-10-02

## 2020-10-03 DIAGNOSIS — F51.01 PRIMARY INSOMNIA: ICD-10-CM

## 2020-10-04 ASSESSMENT — ENCOUNTER SYMPTOMS
HEMATURIA: 0
CONSTIPATION: 0
CHILLS: 0
SORE THROAT: 0
EYE PAIN: 0
HEARTBURN: 0
WEAKNESS: 0
NERVOUS/ANXIOUS: 0
ARTHRALGIAS: 0
DIZZINESS: 0
HEMATOCHEZIA: 0
COUGH: 0
HEADACHES: 0
FEVER: 0
PALPITATIONS: 0
PARESTHESIAS: 0
SHORTNESS OF BREATH: 0
JOINT SWELLING: 0
ABDOMINAL PAIN: 0
DYSURIA: 0
MYALGIAS: 0
FREQUENCY: 0
DIARRHEA: 0
NAUSEA: 0

## 2020-10-04 ASSESSMENT — ACTIVITIES OF DAILY LIVING (ADL): CURRENT_FUNCTION: NO ASSISTANCE NEEDED

## 2020-10-05 ENCOUNTER — OFFICE VISIT (OUTPATIENT)
Dept: INTERNAL MEDICINE | Facility: CLINIC | Age: 65
End: 2020-10-05
Payer: COMMERCIAL

## 2020-10-05 VITALS
HEIGHT: 70 IN | HEART RATE: 78 BPM | WEIGHT: 167.3 LBS | BODY MASS INDEX: 23.95 KG/M2 | SYSTOLIC BLOOD PRESSURE: 136 MMHG | OXYGEN SATURATION: 97 % | TEMPERATURE: 97.8 F | DIASTOLIC BLOOD PRESSURE: 93 MMHG | RESPIRATION RATE: 20 BRPM

## 2020-10-05 DIAGNOSIS — M10.9 GOUTY ARTHRITIS OF TOE OF RIGHT FOOT: ICD-10-CM

## 2020-10-05 DIAGNOSIS — Z12.5 ENCOUNTER FOR SCREENING FOR MALIGNANT NEOPLASM OF PROSTATE: ICD-10-CM

## 2020-10-05 DIAGNOSIS — F51.01 PRIMARY INSOMNIA: ICD-10-CM

## 2020-10-05 DIAGNOSIS — E78.2 MIXED HYPERLIPIDEMIA: ICD-10-CM

## 2020-10-05 DIAGNOSIS — Z00.00 PREVENTATIVE HEALTH CARE: Primary | ICD-10-CM

## 2020-10-05 DIAGNOSIS — M10.9 GOUT OF MULTIPLE SITES, UNSPECIFIED CAUSE, UNSPECIFIED CHRONICITY: Chronic | ICD-10-CM

## 2020-10-05 DIAGNOSIS — M1A.00X0 IDIOPATHIC CHRONIC GOUT WITHOUT TOPHUS, UNSPECIFIED SITE: ICD-10-CM

## 2020-10-05 DIAGNOSIS — E55.9 VITAMIN D INSUFFICIENCY: Chronic | ICD-10-CM

## 2020-10-05 DIAGNOSIS — Z23 NEED FOR PROPHYLACTIC VACCINATION AND INOCULATION AGAINST INFLUENZA: ICD-10-CM

## 2020-10-05 DIAGNOSIS — C61 MALIGNANT NEOPLASM OF PROSTATE (H): ICD-10-CM

## 2020-10-05 LAB
ALBUMIN SERPL-MCNC: 3.8 G/DL (ref 3.4–5)
ALP SERPL-CCNC: 66 U/L (ref 40–150)
ALT SERPL W P-5'-P-CCNC: 20 U/L (ref 0–70)
ANION GAP SERPL CALCULATED.3IONS-SCNC: 5 MMOL/L (ref 3–14)
AST SERPL W P-5'-P-CCNC: 12 U/L (ref 0–45)
BASOPHILS # BLD AUTO: 0 10E9/L (ref 0–0.2)
BASOPHILS NFR BLD AUTO: 0.2 %
BILIRUB SERPL-MCNC: 0.6 MG/DL (ref 0.2–1.3)
BUN SERPL-MCNC: 15 MG/DL (ref 7–30)
CALCIUM SERPL-MCNC: 8.8 MG/DL (ref 8.5–10.1)
CHLORIDE SERPL-SCNC: 106 MMOL/L (ref 94–109)
CHOLEST SERPL-MCNC: 215 MG/DL
CO2 SERPL-SCNC: 27 MMOL/L (ref 20–32)
CREAT SERPL-MCNC: 1.23 MG/DL (ref 0.66–1.25)
DIFFERENTIAL METHOD BLD: NORMAL
EOSINOPHIL # BLD AUTO: 0.1 10E9/L (ref 0–0.7)
EOSINOPHIL NFR BLD AUTO: 1.9 %
ERYTHROCYTE [DISTWIDTH] IN BLOOD BY AUTOMATED COUNT: 13.1 % (ref 10–15)
GFR SERPL CREATININE-BSD FRML MDRD: 61 ML/MIN/{1.73_M2}
GLUCOSE SERPL-MCNC: 103 MG/DL (ref 70–99)
HCT VFR BLD AUTO: 45.8 % (ref 40–53)
HDLC SERPL-MCNC: 59 MG/DL
HGB BLD-MCNC: 15.8 G/DL (ref 13.3–17.7)
LDLC SERPL CALC-MCNC: 130 MG/DL
LYMPHOCYTES # BLD AUTO: 2.5 10E9/L (ref 0.8–5.3)
LYMPHOCYTES NFR BLD AUTO: 38.2 %
MCH RBC QN AUTO: 29.8 PG (ref 26.5–33)
MCHC RBC AUTO-ENTMCNC: 34.5 G/DL (ref 31.5–36.5)
MCV RBC AUTO: 86 FL (ref 78–100)
MONOCYTES # BLD AUTO: 0.5 10E9/L (ref 0–1.3)
MONOCYTES NFR BLD AUTO: 7.1 %
NEUTROPHILS # BLD AUTO: 3.4 10E9/L (ref 1.6–8.3)
NEUTROPHILS NFR BLD AUTO: 52.6 %
NONHDLC SERPL-MCNC: 156 MG/DL
PLATELET # BLD AUTO: 158 10E9/L (ref 150–450)
POTASSIUM SERPL-SCNC: 4.1 MMOL/L (ref 3.4–5.3)
PROT SERPL-MCNC: 7.7 G/DL (ref 6.8–8.8)
RBC # BLD AUTO: 5.3 10E12/L (ref 4.4–5.9)
SODIUM SERPL-SCNC: 138 MMOL/L (ref 133–144)
TRIGL SERPL-MCNC: 132 MG/DL
TSH SERPL DL<=0.005 MIU/L-ACNC: 3.21 MU/L (ref 0.4–4)
URATE SERPL-MCNC: 7.4 MG/DL (ref 3.5–7.2)
WBC # BLD AUTO: 6.5 10E9/L (ref 4–11)

## 2020-10-05 PROCEDURE — 36415 COLL VENOUS BLD VENIPUNCTURE: CPT | Performed by: INTERNAL MEDICINE

## 2020-10-05 PROCEDURE — 80053 COMPREHEN METABOLIC PANEL: CPT | Performed by: INTERNAL MEDICINE

## 2020-10-05 PROCEDURE — G0103 PSA SCREENING: HCPCS | Performed by: INTERNAL MEDICINE

## 2020-10-05 PROCEDURE — 80061 LIPID PANEL: CPT | Performed by: INTERNAL MEDICINE

## 2020-10-05 PROCEDURE — 85025 COMPLETE CBC W/AUTO DIFF WBC: CPT | Performed by: INTERNAL MEDICINE

## 2020-10-05 PROCEDURE — 84443 ASSAY THYROID STIM HORMONE: CPT | Performed by: INTERNAL MEDICINE

## 2020-10-05 PROCEDURE — 82306 VITAMIN D 25 HYDROXY: CPT | Performed by: INTERNAL MEDICINE

## 2020-10-05 PROCEDURE — 90662 IIV NO PRSV INCREASED AG IM: CPT | Performed by: INTERNAL MEDICINE

## 2020-10-05 PROCEDURE — G0008 ADMIN INFLUENZA VIRUS VAC: HCPCS | Performed by: INTERNAL MEDICINE

## 2020-10-05 PROCEDURE — G0438 PPPS, INITIAL VISIT: HCPCS | Performed by: INTERNAL MEDICINE

## 2020-10-05 PROCEDURE — 84550 ASSAY OF BLOOD/URIC ACID: CPT | Performed by: INTERNAL MEDICINE

## 2020-10-05 RX ORDER — ZOLPIDEM TARTRATE 5 MG/1
5 TABLET ORAL
Qty: 30 TABLET | Refills: 0 | OUTPATIENT
Start: 2020-10-05

## 2020-10-05 RX ORDER — COLCHICINE 0.6 MG/1
0.6 TABLET ORAL DAILY PRN
Qty: 90 TABLET | Refills: 3 | Status: SHIPPED | OUTPATIENT
Start: 2020-10-05

## 2020-10-05 RX ORDER — TRAZODONE HYDROCHLORIDE 100 MG/1
100-200 TABLET ORAL AT BEDTIME
Qty: 60 TABLET | Refills: 1 | Status: SHIPPED | OUTPATIENT
Start: 2020-10-05

## 2020-10-05 ASSESSMENT — ENCOUNTER SYMPTOMS
DYSURIA: 0
HEARTBURN: 0
MYALGIAS: 0
JOINT SWELLING: 0
CONSTIPATION: 0
HEADACHES: 0
PARESTHESIAS: 0
FREQUENCY: 0
COUGH: 0
CHILLS: 0
ARTHRALGIAS: 0
NAUSEA: 0
EYE PAIN: 0
HEMATURIA: 0
FEVER: 0
NERVOUS/ANXIOUS: 0
PALPITATIONS: 0
DIZZINESS: 0
SHORTNESS OF BREATH: 0
WEAKNESS: 0
SORE THROAT: 0
ABDOMINAL PAIN: 0
DIARRHEA: 0
HEMATOCHEZIA: 0

## 2020-10-05 ASSESSMENT — ACTIVITIES OF DAILY LIVING (ADL): CURRENT_FUNCTION: NO ASSISTANCE NEEDED

## 2020-10-05 ASSESSMENT — MIFFLIN-ST. JEOR: SCORE: 1537.18

## 2020-10-05 NOTE — PROGRESS NOTES
"SUBJECTIVE:   Sergio Estevez is a 65 year old male who presents for Preventive Visit.    New patient. Fasting.    Patient has been advised of split billing requirements and indicates understanding: Yes     Are you in the first 12 months of your Medicare coverage?  No    Healthy Habits:     In general, how would you rate your overall health?  Excellent    Frequency of exercise:  2-3 days/week    Duration of exercise:  30-45 minutes    Do you usually eat at least 4 servings of fruit and vegetables a day, include whole grains    & fiber and avoid regularly eating high fat or \"junk\" foods?  Yes    Taking medications regularly:  Yes    Medication side effects:  Other    Ability to successfully perform activities of daily living:  No assistance needed    Home Safety:  No safety concerns identified    Hearing Impairment:  Difficulty following a conversation in a noisy restaurant or crowded room, feel that people are mumbling or not speaking clearly, difficulty following dialogue in the theater and difficulty understanding soft or whispered speech    In the past 6 months, have you been bothered by leaking of urine?  No    In general, how would you rate your overall mental or emotional health?  Excellent      PHQ-2 Total Score: 0    Do you feel safe in your environment? Yes    Have you ever done Advance Care Planning? (For example, a Health Directive, POLST, or a discussion with a medical provider or your loved ones about your wishes): No, advance care planning information given to patient to review.  Patient plans to discuss their wishes with loved ones or provider.        Fall risk  Fallen 2 or more times in the past year?: No  Any fall with injury in the past year?: No    Cognitive Screening   1) Repeat 3 items (Leader, Season, Table)    2) Clock draw: NORMAL  3) 3 item recall: Recalls 3 objects  Results: 3 items recalled: COGNITIVE IMPAIRMENT LESS LIKELY    Mini-CogTM Copyright S Jeanie. Licensed by the author for use " in [facility]; reprinted with permission (soob@[facility]). All rights reserved.      Do you have sleep apnea, excessive snoring or daytime drowsiness?: no    Reviewed and updated as needed this visit by clinical staff                 Reviewed and updated as needed this visit by Provider                Social History     Tobacco Use     Smoking status: Former Smoker     Quit date: 11/3/1982     Years since quittin.9     Smokeless tobacco: Never Used   Substance Use Topics     Alcohol use: Yes     Comment: ocasionally     If you drink alcohol do you typically have >3 drinks per day or >7 drinks per week? No    Alcohol Use 10/4/2020   Prescreen: >3 drinks/day or >7 drinks/week? No   Prescreen: >3 drinks/day or >7 drinks/week? -           Current providers sharing in care for this patient include:   Patient Care Team:  No Ref-Primary, Physician as PCP - Tyler Milton MD as Assigned PCP    The following health maintenance items are reviewed in Epic and correct as of today:  Health Maintenance   Topic Date Due     ZOSTER IMMUNIZATION (1 of 2) 2004     Pneumococcal Vaccine: 65+ Years (2 of 2 - PPSV23) 2019     AORTIC ANEURYSM SCREENING (SYSTEM ASSIGNED)  2019     DTAP/TDAP/TD IMMUNIZATION (2 - Td) 2019     INFLUENZA VACCINE (1) 2020     FALL RISK ASSESSMENT  09/10/2020     MEDICARE ANNUAL WELLNESS VISIT  09/10/2020     ADVANCE CARE PLANNING  10/10/2021     LIPID  09/10/2024     COLORECTAL CANCER SCREENING  2029     HEPATITIS C SCREENING  Completed     PHQ-2  Completed     Pneumococcal Vaccine: Pediatrics (0 to 5 Years) and At-Risk Patients (6 to 64 Years)  Aged Out     IPV IMMUNIZATION  Aged Out     MENINGITIS IMMUNIZATION  Aged Out     HEPATITIS B IMMUNIZATION  Aged Out     BP Readings from Last 3 Encounters:   10/05/20 (!) 136/93   09/10/19 118/80   19 132/81    Wt Readings from Last 3 Encounters:   10/05/20 75.9 kg (167 lb 4.8 oz)   09/10/19 75.8 kg  "(167 lb)   07/02/19 76.7 kg (169 lb)                      Review of Systems   Constitutional: Negative for chills and fever.   HENT: Positive for hearing loss. Negative for congestion, ear pain and sore throat.    Eyes: Negative for pain and visual disturbance.   Respiratory: Negative for cough and shortness of breath.    Cardiovascular: Negative for chest pain, palpitations and peripheral edema.   Gastrointestinal: Negative for abdominal pain, constipation, diarrhea, heartburn, hematochezia and nausea.   Genitourinary: Positive for impotence. Negative for discharge, dysuria, frequency, genital sores, hematuria and urgency.   Musculoskeletal: Negative for arthralgias, joint swelling and myalgias.   Skin: Negative for rash.   Neurological: Negative for dizziness, weakness, headaches and paresthesias.   Psychiatric/Behavioral: Negative for mood changes. The patient is not nervous/anxious.          OBJECTIVE:   There were no vitals taken for this visit. Estimated body mass index is 24.31 kg/m  as calculated from the following:    Height as of 1/22/19: 1.765 m (5' 9.5\").    Weight as of 9/10/19: 75.8 kg (167 lb).  Physical Exam  GENERAL: healthy, alert and no distress  EYES: Eyes grossly normal to inspection, PERRL and conjunctivae and sclerae normal  HENT: ear canals and TM's normal, nose and mouth without ulcers or lesions  NECK: no adenopathy, no asymmetry, masses, or scars and thyroid normal to palpation  RESP: lungs clear to auscultation - no rales, rhonchi or wheezes  CV: regular rate and rhythm, normal S1 S2, no S3 or S4, no murmur, click or rub, no peripheral edema and peripheral pulses strong  ABDOMEN: soft, nontender, no hepatosplenomegaly, no masses and bowel sounds normal  MS: no gross musculoskeletal defects noted, no edema  SKIN: no suspicious lesions or rashes  NEURO: Normal strength and tone, mentation intact and speech normal  PSYCH: mentation appears normal, affect normal/bright        ASSESSMENT / " "PLAN:   1. Preventative health care     - CBC with platelets and differential  - TSH with free T4 reflex  - Lipid Profile (Chol, Trig, HDL, LDL calc)  - Vitamin D Deficiency  - Comprehensive metabolic panel (BMP + Alb, Alk Phos, ALT, AST, Total. Bili, TP)    2. Malignant neoplasm of prostate (H)     - PSA, screen    3. Gouty arthritis of toe of right foot     - colchicine (COLCRYS) 0.6 MG tablet; Take 1 tablet (0.6 mg) by mouth daily as needed for moderate pain  Dispense: 90 tablet; Refill: 3    4. Primary insomnia     - traZODone (DESYREL) 100 MG tablet; Take 1-2 tablets (100-200 mg) by mouth At Bedtime  Dispense: 60 tablet; Refill: 1    5. Mixed hyperlipidemia       6. Vitamin D insufficiency     - Vitamin D Deficiency    7. Encounter for screening for malignant neoplasm of prostate      - PSA, screen    8. Gout of multiple sites, unspecified cause, unspecified chronicity     - Uric acid    Patient has been advised of split billing requirements and indicates understanding: Yes  COUNSELING:  Reviewed preventive health counseling, as reflected in patient instructions       Regular exercise       Healthy diet/nutrition    Estimated body mass index is 24.31 kg/m  as calculated from the following:    Height as of 1/22/19: 1.765 m (5' 9.5\").    Weight as of 9/10/19: 75.8 kg (167 lb).        He reports that he quit smoking about 37 years ago. He has never used smokeless tobacco.      Appropriate preventive services were discussed with this patient, including applicable screening as appropriate for cardiovascular disease, diabetes, osteopenia/osteoporosis, and glaucoma.  As appropriate for age/gender, discussed screening for colorectal cancer, prostate cancer, breast cancer, and cervical cancer. Checklist reviewing preventive services available has been given to the patient.    Reviewed patients plan of care and provided an AVS. The Basic Care Plan (routine screening as documented in Health Maintenance) for Sergio meets " the Care Plan requirement. This Care Plan has been established and reviewed with the Patient.    Counseling Resources:  ATP IV Guidelines  Pooled Cohorts Equation Calculator  Breast Cancer Risk Calculator  Breast Cancer: Medication to Reduce Risk  FRAX Risk Assessment  ICSI Preventive Guidelines  Dietary Guidelines for Americans, 2010  USDA's MyPlate  ASA Prophylaxis  Lung CA Screening    Irasema Donis MD  [facility]    Identified Health Risks:

## 2020-10-06 LAB
DEPRECATED CALCIDIOL+CALCIFEROL SERPL-MC: 46 UG/L (ref 20–75)
PSA SERPL-ACNC: 0.05 UG/L (ref 0–4)

## 2020-10-06 RX ORDER — ALLOPURINOL 100 MG/1
200 TABLET ORAL DAILY
Qty: 180 TABLET | Refills: 0 | Status: SHIPPED | OUTPATIENT
Start: 2020-10-06 | End: 2021-01-13

## 2020-10-07 ENCOUNTER — TELEPHONE (OUTPATIENT)
Dept: INTERNAL MEDICINE | Facility: CLINIC | Age: 65
End: 2020-10-07

## 2020-10-07 DIAGNOSIS — M1A.00X0 IDIOPATHIC CHRONIC GOUT WITHOUT TOPHUS, UNSPECIFIED SITE: ICD-10-CM

## 2020-10-07 NOTE — TELEPHONE ENCOUNTER
[facility] calling about allopurinol (ZYLOPRIM) 100 MG tablet  There are two sets of directions and they need clarification. Please call [phone]

## 2020-10-07 NOTE — TELEPHONE ENCOUNTER
"Sig from 10/6/20 allopurinol prescription reads: \"Sig - Route: Take 2 tablets (200 mg) by mouth daily TAKE 1 TABLET (100 MG) BY MOUTH DAILY. - Oral\"    Please advise on how many tablets patient should take daily. Per chart, last three prescriptions read to take once daily.   "

## 2020-10-18 DIAGNOSIS — G47.00 INSOMNIA, UNSPECIFIED TYPE: ICD-10-CM

## 2020-10-19 DIAGNOSIS — F51.01 PRIMARY INSOMNIA: ICD-10-CM

## 2020-10-19 RX ORDER — LORAZEPAM 1 MG/1
1 TABLET ORAL
Qty: 15 TABLET | Refills: 0 | Status: SHIPPED | OUTPATIENT
Start: 2020-10-19 | End: 2020-10-23

## 2020-10-19 NOTE — TELEPHONE ENCOUNTER
Last virtual visit 07/15/2020.    LORazepam (ATIVAN) 1 MG tablet 15 tablet 0 10/2/2020     Last  check - 10/02/2020 no concerns

## 2020-10-20 RX ORDER — ZOLPIDEM TARTRATE 5 MG/1
5 TABLET ORAL
Qty: 30 TABLET | Refills: 0 | Status: SHIPPED | OUTPATIENT
Start: 2020-10-20

## 2020-10-20 NOTE — TELEPHONE ENCOUNTER
Ambien      Last Written Prescription Date:  10-2-20  Last Fill Quantity: 30,   # refills: 0  Last Office Visit: 10-5-20  Future Office visit:       Routing refill request to provider for review/approval because:  Drug not on the [facility], [facility] or [facility] refill protocol or controlled substance

## 2020-10-20 NOTE — TELEPHONE ENCOUNTER
Rx received and faxed to [facility] [phone].  Karon Branch, DOYLE

## 2020-10-21 DIAGNOSIS — G47.00 INSOMNIA, UNSPECIFIED TYPE: ICD-10-CM

## 2020-10-21 DIAGNOSIS — F51.01 PRIMARY INSOMNIA: ICD-10-CM

## 2020-10-22 NOTE — TELEPHONE ENCOUNTER
Patient requesting a 90 day supply.    Controlled Substance Refill Request for Lorazepam  Problem List Complete:  No     PROVIDER TO CONSIDER COMPLETION OF PROBLEM LIST AND OVERVIEW/CONTROLLED SUBSTANCE AGREEMENT    Last Written Prescription Date:  10-19-20 from Dr. Lackey at [facility]  Last Fill Quantity: 15,   # refills: 0    THE MOST RECENT OFFICE VISIT MUST BE WITHIN THE PAST 3 MONTHS. AT LEAST ONE FACE TO FACE VISIT MUST OCCUR EVERY 6 MONTHS. ADDITIONAL VISITS CAN BE VIRTUAL.  (THIS STATEMENT SHOULD BE DELETED.)    Last Office Visit with [facility] [place] primary care provider: 10-5-20 with Dr. Donis    Future Office visit:     Controlled substance agreement:   Encounter-Level CSA:    There are no encounter-level csa.     Patient-Level CSA:    There are no patient-level csa.         Last Urine Drug Screen: No results found for: CDAUT, No results found for: COMDAT, No results found for: THC13, PCP13, COC13, MAMP13, OPI13, AMP13, BZO13, TCA13, MTD13, BAR13, OXY13, PPX13, BUP13     Processing:  Rx to be electronically transmitted to pharmacy by provider      [email or web address]       checked in past 3 months?  Yes 10-22-20  Recommend provider review.    Please advise, thanks.  Routed to covering provider.

## 2020-10-23 RX ORDER — LORAZEPAM 1 MG/1
1 TABLET ORAL
Qty: 15 TABLET | Refills: 0 | Status: SHIPPED | OUTPATIENT
Start: 2020-10-23 | End: 2020-10-23

## 2020-10-23 NOTE — TELEPHONE ENCOUNTER
Provider asked writer to call patient and inform him that he needs to discuss with primary care provider regarding a 90 day supply.      Called patient and left message regarding below message from provider.      Routed message to primary care provider.

## 2020-10-23 NOTE — TELEPHONE ENCOUNTER
I did the refill for 15 pills but canceled as patient already had a medication from Dr. Lackey,, patient needs to discuss with the PCP for a 90-day supply

## 2021-01-12 DIAGNOSIS — M1A.00X0 IDIOPATHIC CHRONIC GOUT WITHOUT TOPHUS, UNSPECIFIED SITE: ICD-10-CM

## 2021-01-12 NOTE — TELEPHONE ENCOUNTER
Routing refill request to provider for review/approval because:  Labs out of range:  Uric acid

## 2021-01-13 RX ORDER — ALLOPURINOL 100 MG/1
TABLET ORAL
Qty: 180 TABLET | Refills: 0 | Status: SHIPPED | OUTPATIENT
Start: 2021-01-13 | End: 2021-04-15

## 2021-04-13 DIAGNOSIS — M1A.00X0 IDIOPATHIC CHRONIC GOUT WITHOUT TOPHUS, UNSPECIFIED SITE: ICD-10-CM

## 2021-04-15 RX ORDER — ALLOPURINOL 100 MG/1
TABLET ORAL
Qty: 180 TABLET | Refills: 0 | Status: SHIPPED | OUTPATIENT
Start: 2021-04-15 | End: 2021-07-20

## 2021-04-15 NOTE — TELEPHONE ENCOUNTER
Pending Prescriptions:                       Disp   Refills    allopurinol (ZYLOPRIM) 100 MG tablet [Phar*180 ta*0        Sig: TAKE 2 TABLETS (200 MG) BY MOUTH DAILY    Routing refill request to provider for review/approval because:  Uric Acid   Date Value Ref Range Status   10/05/2020 7.4 (H) 3.5 - 7.2 mg/dL Final

## 2021-07-18 DIAGNOSIS — M1A.00X0 IDIOPATHIC CHRONIC GOUT WITHOUT TOPHUS, UNSPECIFIED SITE: ICD-10-CM

## 2021-07-20 RX ORDER — ALLOPURINOL 100 MG/1
TABLET ORAL
Qty: 180 TABLET | Refills: 0 | Status: SHIPPED | OUTPATIENT
Start: 2021-07-20

## 2021-09-23 NOTE — TELEPHONE ENCOUNTER
Routing refill request to provider for review/approval because:  Drug not on the [facility], [facility] or  Health refill protocol or controlled substance  Labs out of range for Allopurinal    I see we have tried to reach him multiple times.    Dina Keys RN- Triage [employer]       Post-Care Instructions: I reviewed with the patient in detail post-care instructions. Patient is not to engage in any heavy lifting, exercise, or swimming for the next 14 days. Should the patient develop any fevers, chills, bleeding, severe pain patient will contact the office immediately.

## 2021-10-24 ENCOUNTER — HEALTH MAINTENANCE LETTER (OUTPATIENT)
Age: 66
End: 2021-10-24

## 2021-12-19 ENCOUNTER — HEALTH MAINTENANCE LETTER (OUTPATIENT)
Age: 66
End: 2021-12-19

## 2022-10-15 ENCOUNTER — HEALTH MAINTENANCE LETTER (OUTPATIENT)
Age: 67
End: 2022-10-15

## 2023-03-26 ENCOUNTER — HEALTH MAINTENANCE LETTER (OUTPATIENT)
Age: 68
End: 2023-03-26

## 2024-05-26 ENCOUNTER — HEALTH MAINTENANCE LETTER (OUTPATIENT)
Age: 69
End: 2024-05-26

## 2025-02-20 NOTE — TELEPHONE ENCOUNTER
Routing refill request to provider for review/approval because:  Drug not on the [facility] [facility] or [facility] refill protocol or controlled substance      
Faxed to [facility] [phone]  
What Type Of Note Output Would You Prefer (Optional)?: Bullet Format
Zolpidem Tartrate 10 MG Tablet       Last Written Prescription Date: 12/23/16  Last Fill Quantity: 30,  # refills: 0   Last Office Visit with [facility], [facility] or [facility] prescribing provider: 11/17/16    LORazepam (ATIVAN) 1 MG tablet     Last Written Prescription Date: 12/23/16  Last Fill Quantity: 30, # refills: 0  Last Office Visit with [facility] primary care provider:  11/17/16        Last PHQ-9 score on record= No flowsheet data found.                                                               
Hpi Title: Evaluation of Skin Lesions
How Severe Are Your Spot(S)?: mild
Have Your Spot(S) Been Treated In The Past?: has not been treated
Additional History: Pt here for Fbse, pt has hx of bcc, scc and dn. Pt c/o lesion on nose that is irregular.

## (undated) DEVICE — KIT ENDO TURNOVER/PROCEDURE W/CLEAN A SCOPE LINERS 103888

## (undated) RX ORDER — FENTANYL CITRATE 50 UG/ML
INJECTION, SOLUTION INTRAMUSCULAR; INTRAVENOUS
Status: DISPENSED
Start: 2019-01-22